# Patient Record
Sex: FEMALE | Race: WHITE | NOT HISPANIC OR LATINO | URBAN - METROPOLITAN AREA
[De-identification: names, ages, dates, MRNs, and addresses within clinical notes are randomized per-mention and may not be internally consistent; named-entity substitution may affect disease eponyms.]

---

## 2017-05-18 ENCOUNTER — EMERGENCY (EMERGENCY)
Facility: HOSPITAL | Age: 67
LOS: 1 days | Discharge: HOME | End: 2017-05-18
Attending: EMERGENCY MEDICINE | Admitting: EMERGENCY MEDICINE
Payer: COMMERCIAL

## 2017-05-18 VITALS
WEIGHT: 136.91 LBS | HEIGHT: 68 IN | OXYGEN SATURATION: 100 % | RESPIRATION RATE: 14 BRPM | DIASTOLIC BLOOD PRESSURE: 68 MMHG | TEMPERATURE: 98 F | HEART RATE: 98 BPM | SYSTOLIC BLOOD PRESSURE: 118 MMHG

## 2017-05-18 VITALS
DIASTOLIC BLOOD PRESSURE: 67 MMHG | OXYGEN SATURATION: 100 % | SYSTOLIC BLOOD PRESSURE: 122 MMHG | RESPIRATION RATE: 15 BRPM | HEART RATE: 92 BPM

## 2017-05-18 DIAGNOSIS — M53.3 SACROCOCCYGEAL DISORDERS, NOT ELSEWHERE CLASSIFIED: ICD-10-CM

## 2017-05-18 DIAGNOSIS — S09.90XA UNSPECIFIED INJURY OF HEAD, INITIAL ENCOUNTER: ICD-10-CM

## 2017-05-18 DIAGNOSIS — Z88.8 ALLERGY STATUS TO OTHER DRUGS, MEDICAMENTS AND BIOLOGICAL SUBSTANCES STATUS: ICD-10-CM
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
## 2017-05-18 DIAGNOSIS — Z88.2 ALLERGY STATUS TO SULFONAMIDES: ICD-10-CM

## 2017-05-18 DIAGNOSIS — Z88.1 ALLERGY STATUS TO OTHER ANTIBIOTIC AGENTS STATUS: ICD-10-CM

## 2017-05-18 PROCEDURE — 72125 CT NECK SPINE W/O DYE: CPT

## 2017-05-18 PROCEDURE — 72170 X-RAY EXAM OF PELVIS: CPT | Mod: 26

## 2017-05-18 PROCEDURE — 72170 X-RAY EXAM OF PELVIS: CPT

## 2017-05-18 PROCEDURE — 70450 CT HEAD/BRAIN W/O DYE: CPT

## 2017-05-18 PROCEDURE — 99284 EMERGENCY DEPT VISIT MOD MDM: CPT

## 2017-05-18 PROCEDURE — 70450 CT HEAD/BRAIN W/O DYE: CPT | Mod: 26

## 2017-05-18 PROCEDURE — 99282 EMERGENCY DEPT VISIT SF MDM: CPT

## 2017-05-18 PROCEDURE — 72125 CT NECK SPINE W/O DYE: CPT | Mod: 26

## 2017-05-18 NOTE — ED ADULT NURSE NOTE - OBJECTIVE STATEMENT
Pt arrived c/o s/p fall while exercising today. Pt alert, c/o back pain and headache, VSS no acute distress. Pt denies LOC, no neuro deficits noted at this time. Gait steady. Pt denies blurry vision, no nausea/vomiting. Xray and CT done, awaiting results.

## 2017-05-18 NOTE — ED PROVIDER NOTE - MUSCULOSKELETAL, MLM
Spine appears normal, range of motion is not limited, mild ttp right buttock, no ecchymosis, no swelling

## 2017-05-18 NOTE — ED PROVIDER NOTE - OBJECTIVE STATEMENT
65 yo female s/p mechanical fall at gym while taking a step this morning, her right knee gave out because of fatigue and fell backwards and hit the top of her head against a stationary machine, no LOC, no neck pain.  c/o head pain and right buttock pain.  Ambulating well into ED.  No nausea/vomiting, no other complaints.
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
## 2017-08-17 ENCOUNTER — OUTPATIENT (OUTPATIENT)
Dept: OUTPATIENT SERVICES | Facility: HOSPITAL | Age: 67
LOS: 1 days | End: 2017-08-17
Payer: COMMERCIAL

## 2017-08-17 VITALS
RESPIRATION RATE: 16 BRPM | HEIGHT: 67.5 IN | SYSTOLIC BLOOD PRESSURE: 110 MMHG | WEIGHT: 139.99 LBS | DIASTOLIC BLOOD PRESSURE: 70 MMHG | HEART RATE: 62 BPM | TEMPERATURE: 98 F

## 2017-08-17 DIAGNOSIS — M75.32 CALCIFIC TENDINITIS OF LEFT SHOULDER: ICD-10-CM

## 2017-08-17 DIAGNOSIS — Z90.721 ACQUIRED ABSENCE OF OVARIES, UNILATERAL: Chronic | ICD-10-CM

## 2017-08-17 DIAGNOSIS — Z98.890 OTHER SPECIFIED POSTPROCEDURAL STATES: Chronic | ICD-10-CM

## 2017-08-17 LAB
ALBUMIN SERPL ELPH-MCNC: 4.3 G/DL — SIGNIFICANT CHANGE UP (ref 3.3–5)
ALP SERPL-CCNC: 78 U/L — SIGNIFICANT CHANGE UP (ref 40–120)
ALT FLD-CCNC: 16 U/L — SIGNIFICANT CHANGE UP (ref 4–33)
AST SERPL-CCNC: 25 U/L — SIGNIFICANT CHANGE UP (ref 4–32)
BILIRUB SERPL-MCNC: 0.4 MG/DL — SIGNIFICANT CHANGE UP (ref 0.2–1.2)
BUN SERPL-MCNC: 17 MG/DL — SIGNIFICANT CHANGE UP (ref 7–23)
CALCIUM SERPL-MCNC: 9.1 MG/DL — SIGNIFICANT CHANGE UP (ref 8.4–10.5)
CHLORIDE SERPL-SCNC: 102 MMOL/L — SIGNIFICANT CHANGE UP (ref 98–107)
CO2 SERPL-SCNC: 28 MMOL/L — SIGNIFICANT CHANGE UP (ref 22–31)
CREAT SERPL-MCNC: 0.7 MG/DL — SIGNIFICANT CHANGE UP (ref 0.5–1.3)
GLUCOSE SERPL-MCNC: 79 MG/DL — SIGNIFICANT CHANGE UP (ref 70–99)
POTASSIUM SERPL-MCNC: 4.1 MMOL/L — SIGNIFICANT CHANGE UP (ref 3.5–5.3)
POTASSIUM SERPL-SCNC: 4.1 MMOL/L — SIGNIFICANT CHANGE UP (ref 3.5–5.3)
PROT SERPL-MCNC: 7.1 G/DL — SIGNIFICANT CHANGE UP (ref 6–8.3)
SODIUM SERPL-SCNC: 142 MMOL/L — SIGNIFICANT CHANGE UP (ref 135–145)

## 2017-08-17 PROCEDURE — 93010 ELECTROCARDIOGRAM REPORT: CPT
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
## 2017-08-17 NOTE — H&P PST ADULT - NSANTHOSAYNRD_GEN_A_CORE
No. MARIA FERNANDA screening performed.  STOP BANG Legend: 0-2 = LOW Risk; 3-4 = INTERMEDIATE Risk; 5-8 = HIGH Risk

## 2017-08-17 NOTE — H&P PST ADULT - PSH
H/O lumpectomy  left 10/2016  H/O oophorectomy  2013  S/P D&C  with hysteroscopy for uterine polypectomy in 2010  S/P Tonsillectomy  childhood  Status Post Exploratory Laparotomy  with left oophorectomy  in 1998

## 2017-08-17 NOTE — H&P PST ADULT - HISTORY OF PRESENT ILLNESS
66 year old femael with c/o left shoulder pain x 2 years, xray reveals calcifications. Pt presents today for presurgical eavaluation for ... 66 year old female with c/o left shoulder pain x 2 years, xray reveals calcifications. Pt presents today for presurgical eavaluation for Left Shoulder Arthroscopy Calcium Resection scheduled on 8/31/17.

## 2017-08-17 NOTE — H&P PST ADULT - MUSCULOSKELETAL
details… detailed exam no joint warmth/decreased ROM due to pain/left shoulder/no joint swelling/no joint erythema/no calf tenderness

## 2017-08-17 NOTE — H&P PST ADULT - NEGATIVE ENMT SYMPTOMS
no hearing difficulty/no dysphagia/no vertigo/no sinus symptoms/no ear pain/no tinnitus/no throat pain

## 2017-08-17 NOTE — H&P PST ADULT - FAMILY HISTORY
Mother  Still living? Unknown  Family history of lung cancer, Age at diagnosis: Age Unknown     Father  Still living? Unknown  Family history of liver cancer, Age at diagnosis: Age Unknown

## 2017-08-17 NOTE — H&P PST ADULT - PROBLEM SELECTOR PLAN 1
Left Shoulder Arthroscopy Calcium Resection scheduled on 8/31/17.  Pre-op instructions provided. Pt verbalized understanding.   Pepcid provided for GI prophylaxis.   Chlorhexidine wash provided and instructions given.

## 2017-08-17 NOTE — H&P PST ADULT - NEGATIVE BREAST SYMPTOMS
no nipple discharge R/no breast lump L/no nipple discharge L/no breast tenderness R/no breast lump R

## 2017-08-31 ENCOUNTER — OUTPATIENT (OUTPATIENT)
Dept: OUTPATIENT SERVICES | Facility: HOSPITAL | Age: 67
LOS: 1 days | Discharge: HOME | End: 2017-08-31

## 2017-08-31 VITALS
TEMPERATURE: 98 F | DIASTOLIC BLOOD PRESSURE: 64 MMHG | OXYGEN SATURATION: 99 % | HEART RATE: 87 BPM | SYSTOLIC BLOOD PRESSURE: 105 MMHG | RESPIRATION RATE: 15 BRPM

## 2017-08-31 VITALS
RESPIRATION RATE: 16 BRPM | HEIGHT: 67.5 IN | WEIGHT: 139.99 LBS | HEART RATE: 90 BPM | TEMPERATURE: 98 F | DIASTOLIC BLOOD PRESSURE: 56 MMHG | SYSTOLIC BLOOD PRESSURE: 119 MMHG | OXYGEN SATURATION: 100 %

## 2017-08-31 DIAGNOSIS — Z90.721 ACQUIRED ABSENCE OF OVARIES, UNILATERAL: Chronic | ICD-10-CM

## 2017-08-31 DIAGNOSIS — M75.32 CALCIFIC TENDINITIS OF LEFT SHOULDER: ICD-10-CM

## 2017-08-31 DIAGNOSIS — Z98.890 OTHER SPECIFIED POSTPROCEDURAL STATES: Chronic | ICD-10-CM

## 2017-08-31 RX ORDER — ONDANSETRON 8 MG/1
1 TABLET, FILM COATED ORAL
Qty: 15 | Refills: 0 | OUTPATIENT
Start: 2017-08-31 | End: 2017-09-05

## 2017-08-31 RX ORDER — OXYCODONE HYDROCHLORIDE 5 MG/1
1 TABLET ORAL
Qty: 30 | Refills: 0 | OUTPATIENT
Start: 2017-08-31 | End: 2017-09-05

## 2017-08-31 NOTE — ASU DISCHARGE PLAN (ADULT/PEDIATRIC). - NOTIFY
Fever greater than 101/Bleeding that does not stop/Numbness, color, or temperature change to extremity/Swelling that continues/Pain not relieved by Medications/Inability to Tolerate Liquids or Foods

## 2017-08-31 NOTE — ASU DISCHARGE PLAN (ADULT/PEDIATRIC). - SPECIAL INSTRUCTIONS
refer to Dr. Álvarez Instruction sheet    May remove the dressing in 24 hours; may shower; keep Tegaderm dressing clean, dry and intact.     DO NOT APPLY ANY LOTIONS, CREAMS OR OINTMENTS TO SURGICAL SITE.     Must keep sling in place for 4-6 weeks to provide both comfort and support.

## 2017-09-01 ENCOUNTER — TRANSCRIPTION ENCOUNTER (OUTPATIENT)
Age: 67
End: 2017-09-01

## 2018-01-18 ENCOUNTER — EMERGENCY (EMERGENCY)
Facility: HOSPITAL | Age: 68
LOS: 1 days | Discharge: HOME | End: 2018-01-18
Attending: EMERGENCY MEDICINE | Admitting: EMERGENCY MEDICINE
Payer: MEDICARE

## 2018-01-18 VITALS
HEART RATE: 84 BPM | TEMPERATURE: 98 F | DIASTOLIC BLOOD PRESSURE: 71 MMHG | RESPIRATION RATE: 16 BRPM | OXYGEN SATURATION: 98 % | SYSTOLIC BLOOD PRESSURE: 125 MMHG

## 2018-01-18 VITALS
TEMPERATURE: 98 F | OXYGEN SATURATION: 99 % | WEIGHT: 138.89 LBS | RESPIRATION RATE: 16 BRPM | SYSTOLIC BLOOD PRESSURE: 147 MMHG | HEART RATE: 148 BPM | DIASTOLIC BLOOD PRESSURE: 99 MMHG

## 2018-01-18 DIAGNOSIS — Z98.890 OTHER SPECIFIED POSTPROCEDURAL STATES: Chronic | ICD-10-CM

## 2018-01-18 DIAGNOSIS — Z90.721 ACQUIRED ABSENCE OF OVARIES, UNILATERAL: Chronic | ICD-10-CM

## 2018-01-18 LAB
ANION GAP SERPL CALC-SCNC: 9 MMOL/L — SIGNIFICANT CHANGE UP (ref 5–17)
BUN SERPL-MCNC: 13 MG/DL — SIGNIFICANT CHANGE UP (ref 7–23)
CALCIUM SERPL-MCNC: 9.1 MG/DL — SIGNIFICANT CHANGE UP (ref 8.5–10.1)
CHLORIDE SERPL-SCNC: 106 MMOL/L — SIGNIFICANT CHANGE UP (ref 96–108)
CO2 SERPL-SCNC: 27 MMOL/L — SIGNIFICANT CHANGE UP (ref 22–31)
CREAT SERPL-MCNC: 0.75 MG/DL — SIGNIFICANT CHANGE UP (ref 0.5–1.3)
GLUCOSE SERPL-MCNC: 113 MG/DL — HIGH (ref 70–99)
HCT VFR BLD CALC: 43.9 % — SIGNIFICANT CHANGE UP (ref 34.5–45)
HGB BLD-MCNC: 13.8 G/DL — SIGNIFICANT CHANGE UP (ref 11.5–15.5)
MCHC RBC-ENTMCNC: 28.5 PG — SIGNIFICANT CHANGE UP (ref 27–34)
MCHC RBC-ENTMCNC: 31.5 GM/DL — LOW (ref 32–36)
MCV RBC AUTO: 90.5 FL — SIGNIFICANT CHANGE UP (ref 80–100)
PLATELET # BLD AUTO: 174 K/UL — SIGNIFICANT CHANGE UP (ref 150–400)
POTASSIUM SERPL-MCNC: 3.2 MMOL/L — LOW (ref 3.5–5.3)
POTASSIUM SERPL-SCNC: 3.2 MMOL/L — LOW (ref 3.5–5.3)
RBC # BLD: 4.85 M/UL — SIGNIFICANT CHANGE UP (ref 3.8–5.2)
RBC # FLD: 12.9 % — SIGNIFICANT CHANGE UP (ref 10.3–14.5)
SODIUM SERPL-SCNC: 142 MMOL/L — SIGNIFICANT CHANGE UP (ref 135–145)
WBC # BLD: 4.8 K/UL — SIGNIFICANT CHANGE UP (ref 3.8–10.5)
WBC # FLD AUTO: 4.8 K/UL — SIGNIFICANT CHANGE UP (ref 3.8–10.5)

## 2018-01-18 PROCEDURE — 99285 EMERGENCY DEPT VISIT HI MDM: CPT

## 2018-01-18 PROCEDURE — 85027 COMPLETE CBC AUTOMATED: CPT

## 2018-01-18 PROCEDURE — 80048 BASIC METABOLIC PNL TOTAL CA: CPT

## 2018-01-18 PROCEDURE — 99284 EMERGENCY DEPT VISIT MOD MDM: CPT

## 2018-01-18 PROCEDURE — 93005 ELECTROCARDIOGRAM TRACING: CPT

## 2018-01-18 RX ORDER — POTASSIUM CHLORIDE 20 MEQ
40 PACKET (EA) ORAL ONCE
Qty: 0 | Refills: 0 | Status: COMPLETED | OUTPATIENT
Start: 2018-01-18 | End: 2018-01-18

## 2018-01-18 RX ADMIN — Medication 40 MILLIEQUIVALENT(S): at 10:49

## 2018-01-18 NOTE — ED PROVIDER NOTE - OBJECTIVE STATEMENT
68 yo white female had the acute onset of dizziness few days ago lasting few hours. Symptoms resolved and then returned today while on stationary bicycle. No CP/Nausea/Vomiting/Headache. Does admit to some palpitations. Never had such symptoms in past.

## 2018-01-18 NOTE — ED PROVIDER NOTE - MEDICAL DECISION MAKING DETAILS
Transient dizziness today and few days ago requiring evaluation, labs, ecg and cardiology consultation with Dr. Sidhu
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
## 2018-01-18 NOTE — ED ADULT NURSE NOTE - CHPI ED SYMPTOMS NEG
no diaphoresis/no back pain/no shortness of breath/no chest pain/no cough/no syncope/no chills/no fever

## 2018-01-18 NOTE — ED ADULT NURSE NOTE - OBJECTIVE STATEMENT
pt stating a few episodes since Sunday of dizziness, lightheadedness and heart palpations. pt denies Cp and sob. pt has hx of tachycardia, but only a few episodes.

## 2018-01-18 NOTE — CONSULT NOTE ADULT - ASSESSMENT
67f prior dx of "idiopathic tachycardia". Was told that her bp was too low to treat with meds, and given very rare episodes she was treated conservatively.  No baseline sx of angina, hf or arrhythmia.    Under the weather for several days.  Intermittent lh without real palpitations.  Today went to gym and felt suddenly lightheaded. hr monitor on the bike was "180".  Came to er. initial ekg sr 92    -there is no evidence of acute ischemia.  -there is no documented significant arrhythmia.  -there is no evidence for meaningful  volume overload.  -possible relationship of some arrhythmia to hypokalemia  -suggest outpt echo  -if sxs recur and do not revert back to rare episodes every 3-5 years, monitoring would be useful to determine appropriate strategy for management.

## 2018-01-31 ENCOUNTER — MOBILE ON CALL (OUTPATIENT)
Age: 68
End: 2018-01-31

## 2018-02-01 ENCOUNTER — TRANSCRIPTION ENCOUNTER (OUTPATIENT)
Age: 68
End: 2018-02-01

## 2018-02-01 ENCOUNTER — OTHER (OUTPATIENT)
Age: 68
End: 2018-02-01

## 2018-02-01 DIAGNOSIS — R01.1 CARDIAC MURMUR, UNSPECIFIED: ICD-10-CM

## 2018-05-10 NOTE — ED PROVIDER NOTE - NS ED MD EM SELECTION
Spoke with the patient and gave surgery arrival time 6:00am, also do not eat anything after 9:00pm the night before surgery. Patient may have water, gatorade, or powerade from 9:00pm til you leave your home the morning of surgery.      
[id number] Detailed

## 2018-07-11 ENCOUNTER — INPATIENT (INPATIENT)
Facility: HOSPITAL | Age: 68
LOS: 1 days | Discharge: HOME | DRG: 309 | End: 2018-07-13
Attending: INTERNAL MEDICINE | Admitting: INTERNAL MEDICINE
Payer: MEDICARE

## 2018-07-11 DIAGNOSIS — Z98.890 OTHER SPECIFIED POSTPROCEDURAL STATES: Chronic | ICD-10-CM

## 2018-07-11 DIAGNOSIS — Z90.721 ACQUIRED ABSENCE OF OVARIES, UNILATERAL: Chronic | ICD-10-CM

## 2018-07-12 VITALS
WEIGHT: 139.99 LBS | OXYGEN SATURATION: 100 % | TEMPERATURE: 98 F | SYSTOLIC BLOOD PRESSURE: 146 MMHG | HEIGHT: 68 IN | HEART RATE: 178 BPM | RESPIRATION RATE: 18 BRPM | DIASTOLIC BLOOD PRESSURE: 90 MMHG

## 2018-07-12 DIAGNOSIS — R53.82 CHRONIC FATIGUE, UNSPECIFIED: ICD-10-CM

## 2018-07-12 DIAGNOSIS — I48.91 UNSPECIFIED ATRIAL FIBRILLATION: ICD-10-CM

## 2018-07-12 DIAGNOSIS — A60.00 HERPESVIRAL INFECTION OF UROGENITAL SYSTEM, UNSPECIFIED: ICD-10-CM

## 2018-07-12 DIAGNOSIS — I47.2 VENTRICULAR TACHYCARDIA: ICD-10-CM

## 2018-07-12 DIAGNOSIS — Z29.9 ENCOUNTER FOR PROPHYLACTIC MEASURES, UNSPECIFIED: ICD-10-CM

## 2018-07-12 LAB
ALBUMIN SERPL ELPH-MCNC: 4.2 G/DL — SIGNIFICANT CHANGE UP (ref 3.3–5)
ALP SERPL-CCNC: 97 U/L — SIGNIFICANT CHANGE UP (ref 40–120)
ALT FLD-CCNC: 28 U/L — SIGNIFICANT CHANGE UP (ref 12–78)
ANION GAP SERPL CALC-SCNC: 9 MMOL/L — SIGNIFICANT CHANGE UP (ref 5–17)
ANION GAP SERPL CALC-SCNC: 9 MMOL/L — SIGNIFICANT CHANGE UP (ref 5–17)
AST SERPL-CCNC: 29 U/L — SIGNIFICANT CHANGE UP (ref 15–37)
BASOPHILS # BLD AUTO: 0.05 K/UL — SIGNIFICANT CHANGE UP (ref 0–0.2)
BASOPHILS NFR BLD AUTO: 0.6 % — SIGNIFICANT CHANGE UP (ref 0–2)
BILIRUB SERPL-MCNC: 0.3 MG/DL — SIGNIFICANT CHANGE UP (ref 0.2–1.2)
BUN SERPL-MCNC: 13 MG/DL — SIGNIFICANT CHANGE UP (ref 7–23)
BUN SERPL-MCNC: 19 MG/DL — SIGNIFICANT CHANGE UP (ref 7–23)
CALCIUM SERPL-MCNC: 8.2 MG/DL — LOW (ref 8.5–10.1)
CALCIUM SERPL-MCNC: 9 MG/DL — SIGNIFICANT CHANGE UP (ref 8.5–10.1)
CHLORIDE SERPL-SCNC: 105 MMOL/L — SIGNIFICANT CHANGE UP (ref 96–108)
CHLORIDE SERPL-SCNC: 111 MMOL/L — HIGH (ref 96–108)
CK MB BLD-MCNC: 1.2 % — SIGNIFICANT CHANGE UP (ref 0–3.5)
CK MB BLD-MCNC: 1.5 % — SIGNIFICANT CHANGE UP (ref 0–3.5)
CK MB BLD-MCNC: 1.6 % — SIGNIFICANT CHANGE UP (ref 0–3.5)
CK MB CFR SERPL CALC: 1.7 NG/ML — SIGNIFICANT CHANGE UP (ref 0–3.6)
CK MB CFR SERPL CALC: 2.1 NG/ML — SIGNIFICANT CHANGE UP (ref 0–3.6)
CK MB CFR SERPL CALC: 2.1 NG/ML — SIGNIFICANT CHANGE UP (ref 0–3.6)
CK SERPL-CCNC: 114 U/L — SIGNIFICANT CHANGE UP (ref 26–192)
CK SERPL-CCNC: 129 U/L — SIGNIFICANT CHANGE UP (ref 26–192)
CK SERPL-CCNC: 170 U/L — SIGNIFICANT CHANGE UP (ref 26–192)
CO2 SERPL-SCNC: 25 MMOL/L — SIGNIFICANT CHANGE UP (ref 22–31)
CO2 SERPL-SCNC: 27 MMOL/L — SIGNIFICANT CHANGE UP (ref 22–31)
CREAT SERPL-MCNC: 0.58 MG/DL — SIGNIFICANT CHANGE UP (ref 0.5–1.3)
CREAT SERPL-MCNC: 0.79 MG/DL — SIGNIFICANT CHANGE UP (ref 0.5–1.3)
D DIMER BLD IA.RAPID-MCNC: <150 NG/ML DDU — SIGNIFICANT CHANGE UP
EOSINOPHIL # BLD AUTO: 0.08 K/UL — SIGNIFICANT CHANGE UP (ref 0–0.5)
EOSINOPHIL NFR BLD AUTO: 1 % — SIGNIFICANT CHANGE UP (ref 0–6)
GLUCOSE SERPL-MCNC: 140 MG/DL — HIGH (ref 70–99)
GLUCOSE SERPL-MCNC: 96 MG/DL — SIGNIFICANT CHANGE UP (ref 70–99)
HCT VFR BLD CALC: 41.1 % — SIGNIFICANT CHANGE UP (ref 34.5–45)
HCT VFR BLD CALC: 44 % — SIGNIFICANT CHANGE UP (ref 34.5–45)
HGB BLD-MCNC: 13.3 G/DL — SIGNIFICANT CHANGE UP (ref 11.5–15.5)
HGB BLD-MCNC: 14.3 G/DL — SIGNIFICANT CHANGE UP (ref 11.5–15.5)
IMM GRANULOCYTES NFR BLD AUTO: 0.3 % — SIGNIFICANT CHANGE UP (ref 0–1.5)
LYMPHOCYTES # BLD AUTO: 2.4 K/UL — SIGNIFICANT CHANGE UP (ref 1–3.3)
LYMPHOCYTES # BLD AUTO: 30.7 % — SIGNIFICANT CHANGE UP (ref 13–44)
MAGNESIUM SERPL-MCNC: 2.2 MG/DL — SIGNIFICANT CHANGE UP (ref 1.6–2.6)
MCHC RBC-ENTMCNC: 28.4 PG — SIGNIFICANT CHANGE UP (ref 27–34)
MCHC RBC-ENTMCNC: 28.5 PG — SIGNIFICANT CHANGE UP (ref 27–34)
MCHC RBC-ENTMCNC: 32.4 GM/DL — SIGNIFICANT CHANGE UP (ref 32–36)
MCHC RBC-ENTMCNC: 32.5 GM/DL — SIGNIFICANT CHANGE UP (ref 32–36)
MCV RBC AUTO: 87.8 FL — SIGNIFICANT CHANGE UP (ref 80–100)
MCV RBC AUTO: 87.8 FL — SIGNIFICANT CHANGE UP (ref 80–100)
MONOCYTES # BLD AUTO: 0.68 K/UL — SIGNIFICANT CHANGE UP (ref 0–0.9)
MONOCYTES NFR BLD AUTO: 8.7 % — SIGNIFICANT CHANGE UP (ref 2–14)
NEUTROPHILS # BLD AUTO: 4.58 K/UL — SIGNIFICANT CHANGE UP (ref 1.8–7.4)
NEUTROPHILS NFR BLD AUTO: 58.7 % — SIGNIFICANT CHANGE UP (ref 43–77)
NRBC # BLD: 0 /100 WBCS — SIGNIFICANT CHANGE UP (ref 0–0)
NRBC # BLD: 0 /100 WBCS — SIGNIFICANT CHANGE UP (ref 0–0)
PHOSPHATE SERPL-MCNC: 3.2 MG/DL — SIGNIFICANT CHANGE UP (ref 2.5–4.5)
PLATELET # BLD AUTO: 188 K/UL — SIGNIFICANT CHANGE UP (ref 150–400)
PLATELET # BLD AUTO: 192 K/UL — SIGNIFICANT CHANGE UP (ref 150–400)
POTASSIUM SERPL-MCNC: 3.5 MMOL/L — SIGNIFICANT CHANGE UP (ref 3.5–5.3)
POTASSIUM SERPL-MCNC: 3.5 MMOL/L — SIGNIFICANT CHANGE UP (ref 3.5–5.3)
POTASSIUM SERPL-SCNC: 3.5 MMOL/L — SIGNIFICANT CHANGE UP (ref 3.5–5.3)
POTASSIUM SERPL-SCNC: 3.5 MMOL/L — SIGNIFICANT CHANGE UP (ref 3.5–5.3)
PROT SERPL-MCNC: 7.9 G/DL — SIGNIFICANT CHANGE UP (ref 6–8.3)
RBC # BLD: 4.68 M/UL — SIGNIFICANT CHANGE UP (ref 3.8–5.2)
RBC # BLD: 5.01 M/UL — SIGNIFICANT CHANGE UP (ref 3.8–5.2)
RBC # FLD: 13.7 % — SIGNIFICANT CHANGE UP (ref 10.3–14.5)
RBC # FLD: 14.1 % — SIGNIFICANT CHANGE UP (ref 10.3–14.5)
SODIUM SERPL-SCNC: 141 MMOL/L — SIGNIFICANT CHANGE UP (ref 135–145)
SODIUM SERPL-SCNC: 145 MMOL/L — SIGNIFICANT CHANGE UP (ref 135–145)
T3 SERPL-MCNC: 129 NG/DL — SIGNIFICANT CHANGE UP (ref 80–200)
T4 AB SER-ACNC: 6.8 UG/DL — SIGNIFICANT CHANGE UP (ref 4.6–12)
TROPONIN I SERPL-MCNC: <.015 NG/ML — SIGNIFICANT CHANGE UP (ref 0.01–0.04)
TSH SERPL-MCNC: 0.85 UIU/ML — SIGNIFICANT CHANGE UP (ref 0.36–3.74)
WBC # BLD: 5.87 K/UL — SIGNIFICANT CHANGE UP (ref 3.8–10.5)
WBC # BLD: 7.81 K/UL — SIGNIFICANT CHANGE UP (ref 3.8–10.5)
WBC # FLD AUTO: 5.87 K/UL — SIGNIFICANT CHANGE UP (ref 3.8–10.5)
WBC # FLD AUTO: 7.81 K/UL — SIGNIFICANT CHANGE UP (ref 3.8–10.5)

## 2018-07-12 PROCEDURE — 93010 ELECTROCARDIOGRAM REPORT: CPT

## 2018-07-12 PROCEDURE — 99223 1ST HOSP IP/OBS HIGH 75: CPT

## 2018-07-12 PROCEDURE — 99285 EMERGENCY DEPT VISIT HI MDM: CPT

## 2018-07-12 PROCEDURE — 93306 TTE W/DOPPLER COMPLETE: CPT | Mod: 26

## 2018-07-12 PROCEDURE — 71045 X-RAY EXAM CHEST 1 VIEW: CPT | Mod: 26

## 2018-07-12 RX ORDER — SODIUM CHLORIDE 9 MG/ML
3 INJECTION INTRAMUSCULAR; INTRAVENOUS; SUBCUTANEOUS ONCE
Qty: 0 | Refills: 0 | Status: COMPLETED | OUTPATIENT
Start: 2018-07-12 | End: 2018-07-12

## 2018-07-12 RX ORDER — DILTIAZEM HCL 120 MG
15 CAPSULE, EXT RELEASE 24 HR ORAL
Qty: 125 | Refills: 0 | Status: DISCONTINUED | OUTPATIENT
Start: 2018-07-12 | End: 2018-07-12

## 2018-07-12 RX ORDER — PANTOPRAZOLE SODIUM 20 MG/1
40 TABLET, DELAYED RELEASE ORAL
Qty: 0 | Refills: 0 | Status: DISCONTINUED | OUTPATIENT
Start: 2018-07-12 | End: 2018-07-13

## 2018-07-12 RX ORDER — ENOXAPARIN SODIUM 100 MG/ML
60 INJECTION SUBCUTANEOUS ONCE
Qty: 0 | Refills: 0 | Status: COMPLETED | OUTPATIENT
Start: 2018-07-12 | End: 2018-07-12

## 2018-07-12 RX ORDER — DILTIAZEM HCL 120 MG
20 CAPSULE, EXT RELEASE 24 HR ORAL
Qty: 125 | Refills: 0 | Status: DISCONTINUED | OUTPATIENT
Start: 2018-07-12 | End: 2018-07-12

## 2018-07-12 RX ORDER — DILTIAZEM HCL 120 MG
10 CAPSULE, EXT RELEASE 24 HR ORAL
Qty: 125 | Refills: 0 | Status: DISCONTINUED | OUTPATIENT
Start: 2018-07-12 | End: 2018-07-12

## 2018-07-12 RX ORDER — APIXABAN 2.5 MG/1
5 TABLET, FILM COATED ORAL EVERY 12 HOURS
Qty: 0 | Refills: 0 | Status: DISCONTINUED | OUTPATIENT
Start: 2018-07-13 | End: 2018-07-13

## 2018-07-12 RX ORDER — ASPIRIN/CALCIUM CARB/MAGNESIUM 324 MG
325 TABLET ORAL ONCE
Qty: 0 | Refills: 0 | Status: COMPLETED | OUTPATIENT
Start: 2018-07-12 | End: 2018-07-12

## 2018-07-12 RX ORDER — APIXABAN 2.5 MG/1
1 TABLET, FILM COATED ORAL
Qty: 60 | Refills: 0 | OUTPATIENT
Start: 2018-07-12 | End: 2018-08-10

## 2018-07-12 RX ORDER — ENOXAPARIN SODIUM 100 MG/ML
60 INJECTION SUBCUTANEOUS EVERY 12 HOURS
Qty: 0 | Refills: 0 | Status: DISCONTINUED | OUTPATIENT
Start: 2018-07-12 | End: 2018-07-12

## 2018-07-12 RX ORDER — ACETAMINOPHEN 500 MG
650 TABLET ORAL AT BEDTIME
Qty: 0 | Refills: 0 | Status: DISCONTINUED | OUTPATIENT
Start: 2018-07-12 | End: 2018-07-13

## 2018-07-12 RX ORDER — CYCLOBENZAPRINE HYDROCHLORIDE 10 MG/1
5 TABLET, FILM COATED ORAL ONCE
Qty: 0 | Refills: 0 | Status: COMPLETED | OUTPATIENT
Start: 2018-07-12 | End: 2018-07-12

## 2018-07-12 RX ORDER — POTASSIUM CHLORIDE 20 MEQ
40 PACKET (EA) ORAL EVERY 4 HOURS
Qty: 0 | Refills: 0 | Status: COMPLETED | OUTPATIENT
Start: 2018-07-12 | End: 2018-07-12

## 2018-07-12 RX ORDER — DIPHENHYDRAMINE HCL 50 MG
25 CAPSULE ORAL AT BEDTIME
Qty: 0 | Refills: 0 | Status: DISCONTINUED | OUTPATIENT
Start: 2018-07-12 | End: 2018-07-13

## 2018-07-12 RX ORDER — DILTIAZEM HCL 120 MG
120 CAPSULE, EXT RELEASE 24 HR ORAL DAILY
Qty: 0 | Refills: 0 | Status: DISCONTINUED | OUTPATIENT
Start: 2018-07-12 | End: 2018-07-13

## 2018-07-12 RX ORDER — FAMOTIDINE 10 MG/ML
20 INJECTION INTRAVENOUS ONCE
Qty: 0 | Refills: 0 | Status: COMPLETED | OUTPATIENT
Start: 2018-07-12 | End: 2018-07-12

## 2018-07-12 RX ORDER — ACYCLOVIR SODIUM 500 MG
400 VIAL (EA) INTRAVENOUS
Qty: 0 | Refills: 0 | Status: DISCONTINUED | OUTPATIENT
Start: 2018-07-12 | End: 2018-07-13

## 2018-07-12 RX ORDER — ACYCLOVIR SODIUM 500 MG
0 VIAL (EA) INTRAVENOUS
Qty: 0 | Refills: 0 | COMMUNITY

## 2018-07-12 RX ORDER — SODIUM CHLORIDE 9 MG/ML
1000 INJECTION INTRAMUSCULAR; INTRAVENOUS; SUBCUTANEOUS
Qty: 0 | Refills: 0 | Status: DISCONTINUED | OUTPATIENT
Start: 2018-07-12 | End: 2018-07-12

## 2018-07-12 RX ORDER — SODIUM CHLORIDE 9 MG/ML
1000 INJECTION INTRAMUSCULAR; INTRAVENOUS; SUBCUTANEOUS
Qty: 0 | Refills: 0 | Status: COMPLETED | OUTPATIENT
Start: 2018-07-12 | End: 2018-07-12

## 2018-07-12 RX ADMIN — ENOXAPARIN SODIUM 60 MILLIGRAM(S): 100 INJECTION SUBCUTANEOUS at 00:48

## 2018-07-12 RX ADMIN — Medication 15 MG/HR: at 03:06

## 2018-07-12 RX ADMIN — PANTOPRAZOLE SODIUM 40 MILLIGRAM(S): 20 TABLET, DELAYED RELEASE ORAL at 06:11

## 2018-07-12 RX ADMIN — FAMOTIDINE 20 MILLIGRAM(S): 10 INJECTION INTRAVENOUS at 17:58

## 2018-07-12 RX ADMIN — CYCLOBENZAPRINE HYDROCHLORIDE 5 MILLIGRAM(S): 10 TABLET, FILM COATED ORAL at 03:37

## 2018-07-12 RX ADMIN — SODIUM CHLORIDE 3 MILLILITER(S): 9 INJECTION INTRAMUSCULAR; INTRAVENOUS; SUBCUTANEOUS at 00:02

## 2018-07-12 RX ADMIN — Medication 20 MG/HR: at 02:32

## 2018-07-12 RX ADMIN — Medication 40 MILLIEQUIVALENT(S): at 17:15

## 2018-07-12 RX ADMIN — Medication 400 MILLIGRAM(S): at 06:11

## 2018-07-12 RX ADMIN — SODIUM CHLORIDE 1000 MILLILITER(S): 9 INJECTION INTRAMUSCULAR; INTRAVENOUS; SUBCUTANEOUS at 00:02

## 2018-07-12 RX ADMIN — SODIUM CHLORIDE 50 MILLILITER(S): 9 INJECTION INTRAMUSCULAR; INTRAVENOUS; SUBCUTANEOUS at 05:14

## 2018-07-12 RX ADMIN — Medication 400 MILLIGRAM(S): at 17:15

## 2018-07-12 RX ADMIN — SODIUM CHLORIDE 1000 MILLILITER(S): 9 INJECTION INTRAMUSCULAR; INTRAVENOUS; SUBCUTANEOUS at 01:21

## 2018-07-12 RX ADMIN — ENOXAPARIN SODIUM 60 MILLIGRAM(S): 100 INJECTION SUBCUTANEOUS at 13:05

## 2018-07-12 RX ADMIN — Medication 325 MILLIGRAM(S): at 00:10

## 2018-07-12 RX ADMIN — Medication 40 MILLIEQUIVALENT(S): at 13:05

## 2018-07-12 RX ADMIN — Medication 10 MG/HR: at 00:46

## 2018-07-12 NOTE — PROGRESS NOTE ADULT - ASSESSMENT
68 y/o F PMHX idiopathic ventricular tachycardia, breast cancer 9/2016 s/p surgery and radiation, chronic fatigue syndrome, degenerative disc disease, genital herpes, hiatal hernia admitted with new onset afib w/rvr. Now in NSR.

## 2018-07-12 NOTE — H&P ADULT - NSHPPHYSICALEXAM_GEN_ALL_CORE
Vital Signs Last 24 Hrs  T(C): 36.6 (12 Jul 2018 00:01), Max: 36.6 (12 Jul 2018 00:01)  T(F): 97.8 (12 Jul 2018 00:01), Max: 97.8 (12 Jul 2018 00:01)  HR: 114 (12 Jul 2018 00:30) (114 - 178)  BP: 150/87 (12 Jul 2018 00:30) (144/69 - 150/87)  BP(mean): --  RR: 18 (12 Jul 2018 00:30) (17 - 18)  SpO2: 100% (12 Jul 2018 00:30) (100% - 100%)

## 2018-07-12 NOTE — H&P ADULT - FAMILY HISTORY
Father  Still living? Unknown  Family history of liver cancer, Age at diagnosis: Age Unknown     Mother  Still living? Unknown  Family history of lung cancer, Age at diagnosis: Age Unknown

## 2018-07-12 NOTE — ED PROVIDER NOTE - OBJECTIVE STATEMENT
68 yo F p/w palpitations x past ~ 45 min. Pt states has been having on / off episodes of same x past ~ 2 days. No fever/chills. no numb/ting/focal weak. NO abd pain. no n/v/d. No chest pain. Mild assoc dyspnea. No agg/allev factors. NO other inj or co.

## 2018-07-12 NOTE — CONSULT NOTE ADULT - ASSESSMENT
68 y/o F PMHX idiopathic ventricular tachycardia, breast cancer 9/2016 s/p surgery and radiation, chronic fatigue syndrome, degenerative disc disease, genital herpes, hiatal hernia presents with palpitations x2 days, admitted for new onset atrial fibrillation.      - Telemetry shows pt converted overnight, no other events  - s/p Cardizem ggt, c/w Cardizem 30mg PO Q6hrs  - No clear evidence of acute ischemia, trops negative x 2   - CHADS VASC = 2 (66yo, Female), candidate for AC, but reported hx of MR  - F/u Echo  - No evidence of volume overload  - No acute changes on EKG compared to previous  - Follows with Dr. Brown, f/u outpatient reports  - BP well controlled, continue home BP meds, monitor routine hemodynamics  - monitor and replete lytes, keep K>4, Mg>2  - Other cardiovascular workup will depend on clinical course.  - All other workup per primary team  - Will follow 68 y/o F PMHX idiopathic ventricular tachycardia, breast cancer 9/2016 s/p surgery and radiation, chronic fatigue syndrome, degenerative disc disease, genital herpes, hiatal hernia presents with palpitations x2 days, admitted for new onset atrial fibrillation.      - Telemetry shows pt converted overnight, no other events  - s/p Cardizem ggt, on Cardizem 30mg PO Q6hrs now  - Start Toprol XL 50 PO QD  - No clear evidence of acute ischemia, trops negative x 2   - CHADS VASC = 2 (66yo, Female), candidate for AC, Eliquis would be appropriate  - F/u Echo  - No evidence of volume overload  - No acute changes on EKG compared to previous  - Follows with Dr. Brown, f/u outpatient reports  - BP well controlled, continue home BP meds, monitor routine hemodynamics  - monitor and replete lytes, keep K>4, Mg>2  - Other cardiovascular workup will depend on clinical course.  - All other workup per primary team  - Will follow 66 y/o F PMHX idiopathic ventricular tachycardia, breast cancer 9/2016 s/p surgery and radiation, chronic fatigue syndrome, degenerative disc disease, genital herpes, hiatal hernia presents with palpitations x2 days, admitted for new onset atrial fibrillation.    - Telemetry shows pt converted overnight, no other events  - s/p Cardizem ggt, on Cardizem 30mg PO Q6hrs now  - No clear evidence of acute ischemia, trops negative x 2   - CHADS VASC = 2 (68yo, Female), candidate for AC, Eliquis would be appropriate  - F/u Echo to r/o effusion and and mitral disease  - No evidence of volume overload  - No acute changes on EKG compared to previous  - Follows with Dr. Brown,  - BP well controlled, continue home BP meds, monitor routine hemodynamics  - monitor and replete lytes, keep K>4, Mg>2  - Other cardiovascular workup will depend on clinical course.  - All other workup per primary team  - Will follow
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
## 2018-07-12 NOTE — H&P ADULT - GASTROINTESTINAL DETAILS
no distention/no masses palpable/no rebound tenderness/nontender/soft/no guarding/bowel sounds normal no distention/bowel sounds normal/no bruit/soft/nontender/no masses palpable/no rebound tenderness/no guarding/no organomegaly

## 2018-07-12 NOTE — ED ADULT NURSE REASSESSMENT NOTE - NS ED NURSE REASSESS COMMENT FT1
Patient states she felt a fluttering, palpitation as what she had felt when she came. Dr. Sifuentes made aware and ordered to increased the cardizem drip to 20mg/hr.
Called up [address] to give report but RN assigned to patient is on break at this time. No one will take report as per Kimi. KINGSTON Ruiz made aware.
Cardizem drip lowered down 15mg/hr as ordered.
I spoke with Dr. Maria Gardner and asked her if patient needs to be on isolation precaution for her herpes and told no need as patient taking acyclovir as prophylactic for genital herpes and its chronic.

## 2018-07-12 NOTE — H&P ADULT - ASSESSMENT
68 y/o F PMHX idiopathic ventricular tachycardia, breast cancer 9/2016 s/p surgery and radiation, chronic fatigue syndrome, degenerative disc disease, genital herpes, hiatal hernia admitted with new onset afib w/rvr. 66 y/o F PMHX idiopathic ventricular tachycardia, breast cancer 9/2016 s/p surgery and radiation, chronic fatigue syndrome, degenerative disc disease, genital herpes, hiatal hernia admitted with new onset afib w/rvr. Pt on IV Cardizem drip

## 2018-07-12 NOTE — H&P ADULT - NEGATIVE MUSCULOSKELETAL SYMPTOMS
no muscle cramps/no muscle weakness/no myalgia no myalgia/no arthralgia/no muscle cramps/no arthritis/no muscle weakness

## 2018-07-12 NOTE — H&P ADULT - PROBLEM SELECTOR PLAN 5
DVT PPX -  fall risk, ambulate with assistance, OOB with assistance DVT PPX - s/p lovenox 60mg   fall risk, ambulate with assistance, OOB with assistance DVT PPX - s/p lovenox 60mg - started on lovenox 40mg daily for dvt ppx  fall risk, ambulate with assistance, OOB with assistance DVT PPX - s/p Lovenox 60mg in ER Given  - started on lovenox 40mg daily for dvt ppx  fall risk, ambulate with assistance, OOB with assistance

## 2018-07-12 NOTE — H&P ADULT - NEGATIVE CARDIOVASCULAR SYMPTOMS
no claudication/no chest pain/no dyspnea on exertion/no peripheral edema/no orthopnea/no paroxysmal nocturnal dyspnea

## 2018-07-12 NOTE — H&P ADULT - HISTORY OF PRESENT ILLNESS
66 y/o F PMHX     In the ED, VS:  cbc unremarkable, D-dimer neg, cmp unremarkable except for gluc 140,  neg cardiac enzymes 66 y/o F PMHX     In the ED, VS:  cbc unremarkable, D-dimer neg, cmp unremarkable except for gluc 140, neg cardiac enzymes, EKG afib with RVR @ 173BPM. 68 y/o F PMHX idiopathic ventricular tachycardia, breast cancer 9/2016 s/p surgery and radiation, chronic fatigue syndrome, degenerative disc disease, genital herpes, hiatal hernia presents with   Denies fever, chills, SOB, CP, abd pain, N/V/D,     In the ED, VS: afebrile, , /90, RR 18, sat 100% RA, cbc unremarkable, D-dimer neg, cmp unremarkable except for gluc 140, neg cardiac enzymes, EKG afib with RVR @ 173BPM. Received IV cardizem 10mg x2 and started on cardizem infusion. 66 y/o F PMHX idiopathic ventricular tachycardia, breast cancer 9/2016 s/p surgery and radiation, chronic fatigue syndrome, degenerative disc disease, genital herpes, hiatal hernia presents with palpitations x2 days. Patient states the episodes of palpitations have been intermittent. Reports feeling her heart pounding against her chest last a few minutes at a time and occur at rest and movement. These episodes are similar to the patient's episode of idiopathic v tach about 10 years ago. At that time, patient experienced palpitations, but converted back to sinus. Patient states she has had these episodes on and off through the years, but they are becoming more frequent. Last episode was in 1/2018. At that time, patient was placed on a holter monitor for 30 days without any events. (+) chronic hot flashes. Denies fever, chills, SOB, CP, cough, abd pain, N/V/D, or urinary symptoms. No recent travel. No sick contacts. No change in weight.    In the ED, VS: afebrile, , /90, RR 18, sat 100% RA, cbc unremarkable, D-dimer neg, cmp unremarkable except for gluc 140, neg cardiac enzymes, EKG afib with RVR @ 173BPM. Received IV cardizem 10mg x2 and started on cardizem infusion. Cardio contacted in the ED. CXR prelim neg 66 y/o F PMHX idiopathic ventricular tachycardia, breast cancer 9/2016 s/p surgery and radiation, chronic fatigue syndrome, degenerative disc disease, genital herpes, hiatal hernia presents with palpitations x2 days. Patient states the episodes of palpitations have been intermittent. Reports feeling her heart pounding against her chest last a few minutes at a time and occur at rest and movement. These episodes are similar to the patient's episode of idiopathic v tach about 10 years ago. At that time, patient experienced palpitations, but converted back to sinus. Patient states she has had these episodes on and off through the years, but they are becoming more frequent. Last episode was in 1/2018. At that time, patient was placed on a holter monitor for 30 days without any events. (+) chronic hot flashes. Denies fever, chills, SOB, CP, cough, abd pain, N/V/D, or urinary symptoms. No recent travel. No sick contacts. No change in weight.    In the ED, VS: afebrile, , /90, RR 18, sat 100% RA, cbc unremarkable, D-dimer neg, cmp unremarkable except for gluc 140, neg cardiac enzymes, EKG afib with RVR @ 173BPM. Received IV cardizem 10mg x2 and started on cardizem infusion. Cardio contacted in the ED. CXR prelim neg. In ED, patient became SOB and 's while on cardizem drip @ 15. Repeat ekg similar to prior - afib @ 130s. Called Dr. Myers - likely anxiety. Gave flexeril for anxiety (patient unable to take benzos). 66 y/o F PMHX idiopathic ventricular tachycardia, breast cancer 9/2016 s/p surgery and radiation, chronic fatigue syndrome, degenerative disc disease, genital herpes, hiatal hernia presents with palpitations x2 days. Patient states the episodes of palpitations have been intermittent. Reports feeling her heart pounding against her chest last a few minutes at a time and occur at rest and movement. These episodes are similar to the patient's episode of idiopathic v tach about 10 years ago. At that time, patient experienced palpitations, but converted back to sinus. Patient states she has had these episodes on and off through the years, but they are becoming more frequent. Last episode was in 1/2018. At that time, patient was placed on a holter monitor for 30 days without any events. (+) chronic hot flashes. Denies fever, chills, SOB, CP, cough, abd pain, N/V/D, or urinary symptoms. No recent travel. No sick contacts. No change in weight. As per pt she has had extensive work up in past, seen by electrophysiologist, stress test & Holter monitors, As per pt her BP is Low & she was not on any cardiac meds    In the ED, VS: afebrile, , /90, RR 18, sat 100% RA, cbc unremarkable, D-dimer neg, cmp unremarkable except for gluc 140, neg cardiac enzymes, EKG afib with RVR @ 173BPM. Received IVP Cardizem 10mg x2 and started on IV  cardizem infusion. Cardio contacted in the ED. CXR prelim neg. In ED, patient became SOB and 's while on Cardizem drip @ 15. Repeat ekg similar to prior - afib @ 130s. Called Dr. Myers - likely anxiety. Gave flexeril for anxiety (patient unable to take benzos).

## 2018-07-12 NOTE — H&P ADULT - NEGATIVE NEUROLOGICAL SYMPTOMS
no loss of sensation/no weakness/no loss of consciousness/no headache/no confusion/no paresthesias/no vertigo

## 2018-07-12 NOTE — CONSULT NOTE ADULT - ATTENDING COMMENTS
Seen/examined. Agree with above. She reports a history of an idiopathic atrial arrythmia, but this has never been diagnosed as Atrial fibrillation.  Presents with a bout of AF, now back in SR.  Start cardizem 30 mg po q6hr.   Watch on tele  Continue Lovenox for now. At time of d/c she should be on Eliquis 5 mg po bid (CHADS2-vasc 2)  Echocardiogram to r/o effusion and to evaluate for mitral disease

## 2018-07-12 NOTE — ED ADULT NURSE NOTE - CHPI ED SYMPTOMS NEG
no chills/no cough/no nausea/no vomiting/no back pain/no syncope/no chest pain/no diaphoresis/no fever

## 2018-07-12 NOTE — PROGRESS NOTE ADULT - PROBLEM SELECTOR PLAN 2
Stable  Follow up with cardio Dr. Shipley'  Follows Dr. Brown outpatient Stable- follow K,Mag,.Phos level  Follow up with cardio Dr. Shipley'  Follows Dr. Brown outpatient

## 2018-07-12 NOTE — H&P ADULT - NEUROLOGICAL DETAILS
sensation intact/normal strength/alert and oriented x 3 sensation intact/cranial nerves intact/normal strength/alert and oriented x 3

## 2018-07-12 NOTE — ED ADULT NURSE NOTE - OBJECTIVE STATEMENT
Patient came in to ED c/o palpitations 45 minutes prior to consultation with lightheadedness and mild shortness of breath. Patient denies chest pain, no nausea or vomiting.

## 2018-07-12 NOTE — H&P ADULT - NSHPSOCIALHISTORY_GEN_ALL_CORE
Denies Denies tobacco or illicit drugs  Social ETOH  Lives with .   mammo uptodate - repeat in 11/2018  colonoscopy uptodate - found 1 benign polyp  flu uptodate  pending PNA Denies tobacco or illicit drugs  Social ETOH  Lives with .   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   mammogram uptodate - repeat in 11/2018  colonoscopy uptodate - found 1 benign polyp  flu uptodate  pending PNA

## 2018-07-12 NOTE — PROGRESS NOTE ADULT - SUBJECTIVE AND OBJECTIVE BOX
Patient is a 67y old  Female who presents with a chief complaint of Palpitations (12 Jul 2018 00:56)      INTERVAL HPI:    68 y/o F PMHX idiopathic ventricular tachycardia, breast cancer 9/2016 s/p surgery and radiation, chronic fatigue syndrome, degenerative disc disease, genital herpes, hiatal hernia presents with palpitations x2 days. Patient states the episodes of palpitations have been intermittent. Reports feeling her heart pounding against her chest last a few minutes at a time and occur at rest and movement. These episodes are similar to the patient's episode of idiopathic v tach about 10 years ago. At that time, patient experienced palpitations, but converted back to sinus. Patient states she has had these episodes on and off through the years, but they are becoming more frequent. Last episode was in 1/2018. At that time, patient was placed on a holter monitor for 30 days without any events. (+) chronic hot flashes. Denies fever, chills, SOB, CP, cough, abd pain, N/V/D, or urinary symptoms. No recent travel. No sick contacts. No change in weight.    In the ED, VS: afebrile, , /90, RR 18, sat 100% RA, cbc unremarkable, D-dimer neg, cmp unremarkable except for gluc 140, neg cardiac enzymes, EKG afib with RVR @ 173BPM. Received IV cardizem 10mg x2 and started on cardizem infusion. Cardio contacted in the ED. CXR prelim neg. In ED, patient became SOB and 's while on cardizem drip @ 15. Repeat ekg similar to prior - afib @ 130s. Called Dr. Myers - likely anxiety. Gave flexeril for anxiety (patient unable to take benzos).     7/12/18: Patient seen and examined at bedside. Patient reports minor headache and says her HR feels fast but no other complaints. Patient converted to NSR per tele and transitioned from Cardizem drip to PO. Troponins neg x 3. Cardiology following.       OVERNIGHT EVENTS: No acute events overnight.     Home Medications:  acyclovir 400 mg oral tablet: 1 tab(s) orally 2 times a day (12 Jul 2018 02:14)  cyclobenzaprine 5 mg oral tablet: 1 tab(s) orally 3 times a day, As Needed (12 Jul 2018 02:14)  Tylenol PM 1000 mg-50 mg/30 mL oral liquid: 30 milliliter(s) orally once a day (at bedtime) (12 Jul 2018 02:14)      MEDICATIONS  (STANDING):  acyclovir   Tablet 400 milliGRAM(s) Oral two times a day  diltiazem    Tablet 30 milliGRAM(s) Oral every 6 hours  enoxaparin Injectable 60 milliGRAM(s) SubCutaneous every 12 hours  pantoprazole    Tablet 40 milliGRAM(s) Oral before breakfast  potassium chloride    Tablet ER 40 milliEquivalent(s) Oral every 4 hours    MEDICATIONS  (PRN):  acetaminophen   Tablet. 650 milliGRAM(s) Oral at bedtime PRN insomnia  diphenhydrAMINE   Capsule 25 milliGRAM(s) Oral at bedtime PRN Insomnia      Allergies    adhesives (Unknown)  Cipro (Hepatotoxicity)  sulfa drugs (Rash)  tetracycline (Rash)  Valium (Short breath; Faint; Other)    Intolerances    Gluten (Stomach Upset)      REVIEW OF SYSTEMS:  CONSTITUTIONAL: No fever, No chills, No fatigue, No myalgia, No Body ache, No Weakness  EYES: No eye pain,  No visual disturbances, No discharge, NO Redness  ENMT:  No ear pain, No nose bleed, No vertigo; No sinus or throat pain, No Congestion  NECK: No pain, No stiffness  RESPIRATORY: No cough, wheezing, No  hemoptysis, No shortness of breath  CARDIOVASCULAR: No chest pain, palpitations  GASTROINTESTINAL: No abdominal or epigastric pain. No nausea, No vomiting; No diarrhea or constipation, increased gas [ x ] BM  GENITOURINARY: No dysuria, No frequency, No urgency, No hematuria, or incontinence  NEUROLOGICAL: Intermittent headache, No dizziness, No numbness, No tingling, No tremors, No weakness  EXT: No Swelling, No Pain, No Edema  SKIN:  [ x ] No itching, burning, rashes, or lesions   MUSCULOSKELETAL: No joint pain or swelling; No muscle pain, No back pain, cramps in b/l legs  PSYCHIATRIC: No depression, anxiety, mood swings or difficulty sleeping at night  PAIN SCALE: [ x ] None  [  ] Other-  ROS Unable to obtain due to - [  ] Dementia  [  ] Lethargy  [  ] Sedated [  ] non verbal  REST OF REVIEW Of SYSTEM - [ x ] Normal     Vital Signs Last 24 Hrs  T(C): 36.7 (12 Jul 2018 16:31), Max: 36.9 (12 Jul 2018 02:15)  T(F): 98.1 (12 Jul 2018 16:31), Max: 98.5 (12 Jul 2018 05:07)  HR: 80 (12 Jul 2018 16:31) (80 - 178)  BP: 104/69 (12 Jul 2018 16:31) (89/62 - 150/87)  BP(mean): --  RR: 18 (12 Jul 2018 16:31) (16 - 18)  SpO2: 97% (12 Jul 2018 16:31) (97% - 100%)  Finger Stick        07-11 @ 07:01  -  07-12 @ 07:00  --------------------------------------------------------  IN: 350 mL / OUT: 340 mL / NET: 10 mL    07-12 @ 07:01  -  07-12 @ 17:01  --------------------------------------------------------  IN: 200 mL / OUT: 0 mL / NET: 200 mL        PHYSICAL EXAM:  GENERAL:  [ x ] NAD , [ x ] well appearing, [  ] Agitated, [  ] Drowsy,  [  ] Lethargy, [  ] confused   HEAD:  [ x ] Normal, [  ] Other  EYES:  [ x ] EOMI, [ x ] PERRLA, [ x ] conjunctiva and sclera clear normal, [  ] Other,  [  ] Pallor,[  ] Discharge  ENMT:  [ x ] Normal, [ x ] Moist mucous membranes, [ x ] Good dentition, [ x ] No Thrush  NECK:  [ x ] Supple, [ x ] No JVD, [ x ] Normal thyroid, [  ] Lymphadenopathy [  ] Other  CHEST/LUNG:  [ x ] Clear to auscultation bilaterally, [ x ] Breath Sounds equal,  [ x ] No rales, [ x ] No rhonchi  [ x ]  No wheezing  HEART:  [ x ] Regular rhythm, [ x ] tachycardia, [  ] Bradycardia,  [  ] irregular  [ x ] No murmurs, No rubs, No gallops, [  ] PPM in place (Mfr:  )  ABDOMEN:  [ x ] Soft, [ x ] Nontender, [ x ] Nondistended, [ x ] No mass, [ x ] Bowel sounds present, [  ] obese  NERVOUS SYSTEM:  [ x ] Alert & Oriented X3, [ x ] Nonfocal  [  ] Confusion  [  ] Encephalopathic [  ] Sedated [  ] Unable to assess, [  ] Other-  EXTREMITIES: [ x ] 2+ Peripheral Pulses, No clubbing, No cyanosis,  [  ] edema B/L lower EXT. [  ] PVD stasis skin changes B/L Lower EXT  LYMPH: No lymphadenopathy noted  SKIN:  [ x ] No rashes or lesions, [  ] Pressure Ulcers, [  ] ecchymosis, [  ] Skin Tears, [  ] Other    DIET: DASH/TLC    LABS:                        13.3   5.87  )-----------( 192      ( 12 Jul 2018 06:28 )             41.1     12 Jul 2018 06:28    145    |  111    |  13     ----------------------------<  96     3.5     |  25     |  0.58     Ca    8.2        12 Jul 2018 06:28  Phos  3.2       12 Jul 2018 06:28  Mg     2.2       12 Jul 2018 06:28    TPro  7.9    /  Alb  4.2    /  TBili  0.3    /  DBili  x      /  AST  29     /  ALT  28     /  AlkPhos  97     12 Jul 2018 00:12                  CARDIAC MARKERS ( 12 Jul 2018 12:40 )  <.015 ng/mL / x     / 114 U/L / x     / 1.7 ng/mL  CARDIAC MARKERS ( 12 Jul 2018 06:28 )  <.015 ng/mL / x     / 129 U/L / x     / 2.1 ng/mL  CARDIAC MARKERS ( 12 Jul 2018 00:12 )  <.015 ng/mL / x     / 170 U/L / x     / 2.1 ng/mL             Anemia Panel:      Thyroid Panel:  T4, Serum: 6.8 ug/dL (07-12-18 @ 07:42)  Thyroid Stimulating Hormone, Serum: 0.85 uIU/mL (07-12-18 @ 06:28)                RADIOLOGY & ADDITIONAL TESTS:    CXR: Clear lungs      HEALTH ISSUES - PROBLEM Dx:  Prophylactic measure: Prophylactic measure  Genital herpes: Genital herpes  Chronic Fatigue Syndrome: Chronic Fatigue Syndrome  Idiopathic Ventricular Tachycardia: Idiopathic Ventricular Tachycardia  New onset atrial fibrillation: New onset atrial fibrillation          Consultant(s) Notes Reviewed:  [ x ] YES     Care Discussed with [X] Consultants  [ x ] Patient  [  ] Family  [  ]   [  ] Social Service  [ x ] RN, [  ] Physical Therapy  DVT PPX: [ x ] Lovenox, [  ] S C Heparin, [  ] Coumadin, [  ] Xarelto, [  ] Eliquis, [  ] Pradaxa, [  ] IV Heparin drip, [  ] SCD [  ] Contraindication 2 to GI Bleed,[  ] Ambulation  Advanced directive: [ x ] None, [  ] DNR/DNI Patient is a 67y old  Female who presents with a chief complaint of Palpitations (12 Jul 2018 00:56)      INTERVAL HPI:    68 y/o F PMHX idiopathic ventricular tachycardia, breast cancer 9/2016 s/p surgery and radiation, chronic fatigue syndrome, degenerative disc disease, genital herpes, hiatal hernia presents with palpitations x2 days. Patient states the episodes of palpitations have been intermittent. Reports feeling her heart pounding against her chest last a few minutes at a time and occur at rest and movement. These episodes are similar to the patient's episode of idiopathic v tach about 10 years ago. At that time, patient experienced palpitations, but converted back to sinus. Patient states she has had these episodes on and off through the years, but they are becoming more frequent. Last episode was in 1/2018. At that time, patient was placed on a holter monitor for 30 days without any events. (+) chronic hot flashes. Denies fever, chills, SOB, CP, cough, abd pain, N/V/D, or urinary symptoms. No recent travel. No sick contacts. No change in weight.    In the ED, VS: afebrile, , /90, RR 18, sat 100% RA, cbc unremarkable, D-dimer neg, cmp unremarkable except for gluc 140, neg cardiac enzymes, EKG afib with RVR @ 173BPM. Received IV cardizem 10mg x2 and started on cardizem infusion. Cardio contacted in the ED. CXR prelim neg. In ED, patient became SOB and 's while on cardizem drip @ 15. Repeat ekg similar to prior - afib @ 130s. Called Dr. Myers - likely anxiety. Gave flexeril for anxiety (patient unable to take benzos).     7/12/18: Patient seen and examined at bedside. Patient reports minor headache and says her HR feels fast but no other complaints. Patient converted to NSR per tele and transitioned from Cardizem drip to PO. Troponins neg x 3. Cardiology following. S/P pt converted to NSR, Now on PO Cardizem , Full dose Lovenox 60 mg SC q 12 hrs as per cardiology,       OVERNIGHT EVENTS: No acute events overnight.     Home Medications:  acyclovir 400 mg oral tablet: 1 tab(s) orally 2 times a day (12 Jul 2018 02:14)  cyclobenzaprine 5 mg oral tablet: 1 tab(s) orally 3 times a day, As Needed (12 Jul 2018 02:14)  Tylenol PM 1000 mg-50 mg/30 mL oral liquid: 30 milliliter(s) orally once a day (at bedtime) (12 Jul 2018 02:14)      MEDICATIONS  (STANDING):  acyclovir   Tablet 400 milliGRAM(s) Oral two times a day  diltiazem    Tablet 30 milliGRAM(s) Oral every 6 hours  enoxaparin Injectable 60 milliGRAM(s) SubCutaneous every 12 hours  pantoprazole    Tablet 40 milliGRAM(s) Oral before breakfast  potassium chloride    Tablet ER 40 milliEquivalent(s) Oral every 4 hours    MEDICATIONS  (PRN):  acetaminophen   Tablet. 650 milliGRAM(s) Oral at bedtime PRN insomnia  diphenhydrAMINE   Capsule 25 milliGRAM(s) Oral at bedtime PRN Insomnia      Allergies    adhesives (Unknown)  Cipro (Hepatotoxicity)  sulfa drugs (Rash)  tetracycline (Rash)  Valium (Short breath; Faint; Other)    Intolerances    Gluten (Stomach Upset)      REVIEW OF SYSTEMS: I feel fine , want to go home  CONSTITUTIONAL: No fever, No chills, No fatigue, No myalgia, No Body ache, No Weakness  EYES: No eye pain,  No visual disturbances, No discharge, NO Redness  ENMT:  No ear pain, No nose bleed, No vertigo; No sinus or throat pain, No Congestion  NECK: No pain, No stiffness  RESPIRATORY: No cough, wheezing, No  hemoptysis, No shortness of breath  CARDIOVASCULAR: No chest pain, palpitations  GASTROINTESTINAL: No abdominal or epigastric pain. No nausea, No vomiting; No diarrhea or constipation, increased gas [ x ] BM  GENITOURINARY: No dysuria, No frequency, No urgency, No hematuria, or incontinence  NEUROLOGICAL: Intermittent headache, No dizziness, No numbness, No tingling, No tremors, No weakness  EXT: No Swelling, No Pain, No Edema  SKIN:  [ x ] No itching, burning, rashes, or lesions   MUSCULOSKELETAL: No joint pain or swelling; No muscle pain, No back pain, cramps in b/l legs  PSYCHIATRIC: No depression, anxiety, mood swings or difficulty sleeping at night  PAIN SCALE: [ x ] None  [  ] Other-  ROS Unable to obtain due to - [  ] Dementia  [  ] Lethargy  [  ] Sedated [  ] non verbal  REST OF REVIEW Of SYSTEM - [ x ] Normal     Vital Signs Last 24 Hrs  T(C): 36.7 (12 Jul 2018 16:31), Max: 36.9 (12 Jul 2018 02:15)  T(F): 98.1 (12 Jul 2018 16:31), Max: 98.5 (12 Jul 2018 05:07)  HR: 80 (12 Jul 2018 16:31) (80 - 178)  BP: 104/69 (12 Jul 2018 16:31) (89/62 - 150/87)  BP(mean): --  RR: 18 (12 Jul 2018 16:31) (16 - 18)  SpO2: 97% (12 Jul 2018 16:31) (97% - 100%)  Finger Stick        07-11 @ 07:01  -  07-12 @ 07:00  --------------------------------------------------------  IN: 350 mL / OUT: 340 mL / NET: 10 mL    07-12 @ 07:01  -  07-12 @ 17:01  --------------------------------------------------------  IN: 200 mL / OUT: 0 mL / NET: 200 mL        PHYSICAL EXAM:OOB to chair  GENERAL:  [ x ] NAD , [ x ] well appearing, [  ] Agitated, [  ] Drowsy,  [  ] Lethargy, [  ] confused   HEAD:  [ x ] Normal, [  ] Other  EYES:  [ x ] EOMI, [ x ] PERRLA, [ x ] conjunctiva and sclera clear normal, [  ] Other,  [  ] Pallor,[  ] Discharge  ENMT:  [ x ] Normal, [ x ] Moist mucous membranes, [ x ] Good dentition, [ x ] No Thrush  NECK:  [ x ] Supple, [ x ] No JVD, [ x ] Normal thyroid, [  ] Lymphadenopathy [  ] Other  CHEST/LUNG:  [ x ] Clear to auscultation bilaterally, [ x ] Breath Sounds equal,  [ x ] No rales, [ x ] No rhonchi  [ x ]  No wheezing  HEART:  [ x ] Regular rhythm, [ x ] tachycardia, [  ] Bradycardia,  [  ] irregular  [ x ] No murmurs, No rubs, No gallops, [  ] PPM in place (Mfr:  )  ABDOMEN:  [ x ] Soft, [ x ] Nontender, [ x ] Nondistended, [ x ] No mass, [ x ] Bowel sounds present, [  ] obese  NERVOUS SYSTEM:  [ x ] Alert & Oriented X3, [ x ] Nonfocal  [  ] Confusion  [  ] Encephalopathic [  ] Sedated [  ] Unable to assess, [  ] Other-  EXTREMITIES: [ x ] 2+ Peripheral Pulses, No clubbing, No cyanosis,  [  ] edema B/L lower EXT. [  ] PVD stasis skin changes B/L Lower EXT  LYMPH: No lymphadenopathy noted  SKIN:  [ x ] No rashes or lesions, [  ] Pressure Ulcers, [  ] ecchymosis, [  ] Skin Tears, [  ] Other    DIET: DASH/TLC    LABS:                        13.3   5.87  )-----------( 192      ( 12 Jul 2018 06:28 )             41.1     12 Jul 2018 06:28    145    |  111    |  13     ----------------------------<  96     3.5     |  25     |  0.58     Ca    8.2        12 Jul 2018 06:28  Phos  3.2       12 Jul 2018 06:28  Mg     2.2       12 Jul 2018 06:28    TPro  7.9    /  Alb  4.2    /  TBili  0.3    /  DBili  x      /  AST  29     /  ALT  28     /  AlkPhos  97     12 Jul 2018 00:12    CARDIAC MARKERS ( 12 Jul 2018 12:40 )  <.015 ng/mL / x     / 114 U/L / x     / 1.7 ng/mL  CARDIAC MARKERS ( 12 Jul 2018 06:28 )  <.015 ng/mL / x     / 129 U/L / x     / 2.1 ng/mL  CARDIAC MARKERS ( 12 Jul 2018 00:12 )  <.015 ng/mL / x     / 170 U/L / x     / 2.1 ng/mL  :      Thyroid Panel:  T4, Serum: 6.8 ug/dL (07-12-18 @ 07:42)  Thyroid Stimulating Hormone, Serum: 0.85 uIU/mL (07-12-18 @ 06:28)      RADIOLOGY & ADDITIONAL TESTS:NONE    CXR: Clear lungs      HEALTH ISSUES - PROBLEM Dx:  Prophylactic measure: Prophylactic measure  Genital herpes: Genital herpes  Chronic Fatigue Syndrome: Chronic Fatigue Syndrome  Idiopathic Ventricular Tachycardia: Idiopathic Ventricular Tachycardia  New onset atrial fibrillation: New onset atrial fibrillation      Consultant(s) Notes Reviewed:  [ x ] YES     Care Discussed with [X] Consultants  [ x ] Patient  [ x ] Family  [  ]   [  ] Social Service  [ x ] RN, [  ] Physical Therapy  DVT PPX: [ x ] Lovenox, [  ] S C Heparin, [  ] Coumadin, [  ] Xarelto, [  ] Eliquis, [  ] Pradaxa, [  ] IV Heparin drip, [  ] SCD [  ] Contraindication 2 to GI Bleed,[  ] Ambulation  Advanced directive: [ x ] None, [  ] DNR/DNI

## 2018-07-12 NOTE — ED PROVIDER NOTE - ENMT, MLM
Airway patent, Nasal mucosa clear. Mouth with normal mucosa. Throat has no vesicles, no oropharyngeal exudates and uvula is midline. MM Moist. neck supple. no meningeal signs.

## 2018-07-12 NOTE — PROGRESS NOTE ADULT - PROBLEM SELECTOR PLAN 1
Monitored on tele- Converted to NSR overnight  d-dimer neg  s/p cardizem drip- Patient on PO cardizem 30 mg Q6H  s/p flexeril x1 for anxiety- Pt cannot tolerate benzos  CHADSVASC score 2 (67Y F)   protonix   TTE to rule out mitral disease and effusion- F/u results  EKG unchanged from previous  Cardiac enzymes negative x 3  Thyroid function tests normal  Monitor and replete lytes, keep K>4, Mg>2  Continue home BP meds  Cardiology following, Dr. Shipley Monitored on tele- Converted to NSR overnight  d-dimer neg  s/p Cardizem drip- Patient on PO Cardizem 30 mg Q6H  D/W Dr Shipley- Start Cardizem  mg daily, Elaquis 5 mg q 12 hrs  s/p flexeril x1 for anxiety- Pt cannot tolerate benzos  CHADSVASC score 2 (67Y F)   Protonix 40 mg daily   TTE to rule out mitral disease and effusion- F/u results  EKG unchanged from previous  Cardiac enzymes negative x 3  Thyroid function tests normal  Monitor and replete lytes, keep K>4, Mg>2  Continue home BP meds  Cardiology following, Dr. Shipley

## 2018-07-12 NOTE — H&P ADULT - RS GEN PE MLT RESP DETAILS PC
breath sounds equal/respirations non-labored/clear to auscultation bilaterally breath sounds equal/no wheezes/respirations non-labored/normal/no rhonchi/clear to auscultation bilaterally/no rales

## 2018-07-12 NOTE — H&P ADULT - PROBLEM SELECTOR PLAN 1
Admit to tele  s/p IV cardizem 10mg x 2 and lovenox 60mg  started on cardizem drip  CHADSVAC  TTE in am  trend cardiac enzymes Admit to tele  s/p IV cardizem 10mg x 2 and lovenox 60mg  started on cardizem drip  CHADSVASC score 0 - low risk no A/C therapy  trend cardiac enzymes x 3  f/u TSH, T3, T3  consult Dr. Shipley Admit to tele  d-dimer neg  s/p IV cardizem 10mg x 2 and lovenox 60mg  started on cardizem drip @ 15mg/hr  s/p flexeril x1 for anxiety  CHADSVASC score 0 - low risk no A/C therapy  TTE in am  trend cardiac enzymes x 3  f/u TSH, T3, T3  consult Dr. Shipley Admit to tele  d-dimer neg  s/p IV cardizem 10mg x 2 and lovenox 60mg  started on cardizem drip @ 15mg/hr  s/p flexeril x1 for anxiety  CHADSVASC score 0 - low risk no A/C therapy  protonix   TTE in am  trend cardiac enzymes x 3  f/u TSH, T3, T3  consult Dr. Shipley Admit to tele  d-dimer neg  s/p IV Cardizem 10mg x 2 and Lovenox 60mg  started on Cardizem drip @ 15mg/hr  s/p flexeril x1 for anxiety  CHADSVASC score 0 - low risk no A/C therapy  protonix   TTE in am  trend cardiac enzymes x 3  f/u TSH, T3, T3  consult Dr. Shipley

## 2018-07-12 NOTE — H&P ADULT - PROBLEM SELECTOR PLAN 3
Stable - continue with tylenol pm Stable - continue with tylenol pm  (tylenol and benadryl) Stable - continue with acyclovir 2x day

## 2018-07-12 NOTE — ED PROVIDER NOTE - PROGRESS NOTE DETAILS
Pt doing well, HR better controlled, no acute changes. Pt doing well, HR now ~ 120 - 130, will cont to monitor. Dw Dr WILFRID Myers, will see pt to admit.

## 2018-07-13 ENCOUNTER — TRANSCRIPTION ENCOUNTER (OUTPATIENT)
Age: 68
End: 2018-07-13

## 2018-07-13 VITALS
DIASTOLIC BLOOD PRESSURE: 66 MMHG | TEMPERATURE: 98 F | RESPIRATION RATE: 18 BRPM | HEART RATE: 81 BPM | OXYGEN SATURATION: 99 % | SYSTOLIC BLOOD PRESSURE: 98 MMHG

## 2018-07-13 LAB
ALBUMIN SERPL ELPH-MCNC: 3.6 G/DL — SIGNIFICANT CHANGE UP (ref 3.3–5)
ALP SERPL-CCNC: 85 U/L — SIGNIFICANT CHANGE UP (ref 40–120)
ALT FLD-CCNC: 24 U/L — SIGNIFICANT CHANGE UP (ref 12–78)
ANION GAP SERPL CALC-SCNC: 7 MMOL/L — SIGNIFICANT CHANGE UP (ref 5–17)
AST SERPL-CCNC: 22 U/L — SIGNIFICANT CHANGE UP (ref 15–37)
BILIRUB SERPL-MCNC: 0.6 MG/DL — SIGNIFICANT CHANGE UP (ref 0.2–1.2)
BUN SERPL-MCNC: 13 MG/DL — SIGNIFICANT CHANGE UP (ref 7–23)
CALCIUM SERPL-MCNC: 9 MG/DL — SIGNIFICANT CHANGE UP (ref 8.5–10.1)
CHLORIDE SERPL-SCNC: 107 MMOL/L — SIGNIFICANT CHANGE UP (ref 96–108)
CO2 SERPL-SCNC: 30 MMOL/L — SIGNIFICANT CHANGE UP (ref 22–31)
CREAT SERPL-MCNC: 0.74 MG/DL — SIGNIFICANT CHANGE UP (ref 0.5–1.3)
GLUCOSE SERPL-MCNC: 80 MG/DL — SIGNIFICANT CHANGE UP (ref 70–99)
HCT VFR BLD CALC: 40.9 % — SIGNIFICANT CHANGE UP (ref 34.5–45)
HGB BLD-MCNC: 13.2 G/DL — SIGNIFICANT CHANGE UP (ref 11.5–15.5)
MCHC RBC-ENTMCNC: 28.8 PG — SIGNIFICANT CHANGE UP (ref 27–34)
MCHC RBC-ENTMCNC: 32.3 GM/DL — SIGNIFICANT CHANGE UP (ref 32–36)
MCV RBC AUTO: 89.3 FL — SIGNIFICANT CHANGE UP (ref 80–100)
NRBC # BLD: 0 /100 WBCS — SIGNIFICANT CHANGE UP (ref 0–0)
PLATELET # BLD AUTO: 173 K/UL — SIGNIFICANT CHANGE UP (ref 150–400)
POTASSIUM SERPL-MCNC: 4.6 MMOL/L — SIGNIFICANT CHANGE UP (ref 3.5–5.3)
POTASSIUM SERPL-SCNC: 4.6 MMOL/L — SIGNIFICANT CHANGE UP (ref 3.5–5.3)
PROT SERPL-MCNC: 7 G/DL — SIGNIFICANT CHANGE UP (ref 6–8.3)
RBC # BLD: 4.58 M/UL — SIGNIFICANT CHANGE UP (ref 3.8–5.2)
RBC # FLD: 14.1 % — SIGNIFICANT CHANGE UP (ref 10.3–14.5)
SODIUM SERPL-SCNC: 144 MMOL/L — SIGNIFICANT CHANGE UP (ref 135–145)
WBC # BLD: 4.49 K/UL — SIGNIFICANT CHANGE UP (ref 3.8–10.5)
WBC # FLD AUTO: 4.49 K/UL — SIGNIFICANT CHANGE UP (ref 3.8–10.5)

## 2018-07-13 PROCEDURE — 93005 ELECTROCARDIOGRAM TRACING: CPT

## 2018-07-13 PROCEDURE — 82550 ASSAY OF CK (CPK): CPT

## 2018-07-13 PROCEDURE — 85379 FIBRIN DEGRADATION QUANT: CPT

## 2018-07-13 PROCEDURE — 96376 TX/PRO/DX INJ SAME DRUG ADON: CPT

## 2018-07-13 PROCEDURE — 82553 CREATINE MB FRACTION: CPT

## 2018-07-13 PROCEDURE — 84443 ASSAY THYROID STIM HORMONE: CPT

## 2018-07-13 PROCEDURE — 36415 COLL VENOUS BLD VENIPUNCTURE: CPT

## 2018-07-13 PROCEDURE — 71045 X-RAY EXAM CHEST 1 VIEW: CPT

## 2018-07-13 PROCEDURE — 93306 TTE W/DOPPLER COMPLETE: CPT

## 2018-07-13 PROCEDURE — 80053 COMPREHEN METABOLIC PANEL: CPT

## 2018-07-13 PROCEDURE — 85027 COMPLETE CBC AUTOMATED: CPT

## 2018-07-13 PROCEDURE — 96372 THER/PROPH/DIAG INJ SC/IM: CPT | Mod: XU

## 2018-07-13 PROCEDURE — 83735 ASSAY OF MAGNESIUM: CPT

## 2018-07-13 PROCEDURE — 84436 ASSAY OF TOTAL THYROXINE: CPT

## 2018-07-13 PROCEDURE — 96374 THER/PROPH/DIAG INJ IV PUSH: CPT

## 2018-07-13 PROCEDURE — 84484 ASSAY OF TROPONIN QUANT: CPT

## 2018-07-13 PROCEDURE — 80048 BASIC METABOLIC PNL TOTAL CA: CPT

## 2018-07-13 PROCEDURE — 99285 EMERGENCY DEPT VISIT HI MDM: CPT | Mod: 25

## 2018-07-13 PROCEDURE — 99232 SBSQ HOSP IP/OBS MODERATE 35: CPT

## 2018-07-13 PROCEDURE — 84480 ASSAY TRIIODOTHYRONINE (T3): CPT

## 2018-07-13 PROCEDURE — 84100 ASSAY OF PHOSPHORUS: CPT

## 2018-07-13 RX ORDER — DILTIAZEM HCL 120 MG
1 CAPSULE, EXT RELEASE 24 HR ORAL
Qty: 30 | Refills: 0 | OUTPATIENT
Start: 2018-07-13 | End: 2018-08-11

## 2018-07-13 RX ORDER — APIXABAN 2.5 MG/1
1 TABLET, FILM COATED ORAL
Qty: 0 | Refills: 0 | COMMUNITY
Start: 2018-07-13

## 2018-07-13 RX ADMIN — PANTOPRAZOLE SODIUM 40 MILLIGRAM(S): 20 TABLET, DELAYED RELEASE ORAL at 05:24

## 2018-07-13 RX ADMIN — APIXABAN 5 MILLIGRAM(S): 2.5 TABLET, FILM COATED ORAL at 12:06

## 2018-07-13 RX ADMIN — Medication 120 MILLIGRAM(S): at 05:25

## 2018-07-13 RX ADMIN — APIXABAN 5 MILLIGRAM(S): 2.5 TABLET, FILM COATED ORAL at 01:16

## 2018-07-13 RX ADMIN — Medication 400 MILLIGRAM(S): at 05:25

## 2018-07-13 NOTE — PROGRESS NOTE ADULT - PROBLEM SELECTOR PLAN 1
Currently NSR  D-dimer neg  D/W Dr Shipley- Start Cardizem  mg daily, Eliquis 5 mg q 12 hrs  CHADSVASC score 2 (67Y F)   Protonix 40 mg daily   TTE to rule out mitral disease and effusion- F/u results  EKG unchanged from previous  Cardiac enzymes negative x 3  Thyroid function tests normal  Monitor and replete lytes, keep K>4, Mg>2  Continue home BP meds  Cardiology following, Dr. Shipley Currently NSR, S/p IV cardizem drip  D-dimer neg  D/W Dr Shipley- Start Cardizem  mg daily, Eliquis 5 mg q 12 hrs  CHADSVASC score 2 (67Y F)   Protonix 40 mg daily   TTE to rule out mitral disease and effusion- F/u results  EKG unchanged from previous  Cardiac enzymes negative x 3  Thyroid function tests normal  Monitor and replete lytes, keep K>4, Mg>2  Continue home BP meds  Cardiology following, Dr. Sanchez, stable for D/C home today on AC

## 2018-07-13 NOTE — PROGRESS NOTE ADULT - ATTENDING COMMENTS
I saw and examined the patient personally 7/13. Spoke with above provider regarding this case. I reviewed the above findings completely.  I agree with the above history, physical, and plan which I have edited where appropriate.
Pt seen, Examined, case & care plan d/w pt, residents, cardiology at detail,   D/C pt Home, Total D/c care time is 40 minutes.
Pt seen, Examined, case & care plan d/w pt, residents & DR Shipley at detail.  D/C planning in AM

## 2018-07-13 NOTE — PROGRESS NOTE ADULT - PROBLEM SELECTOR PLAN 5
DVT PPX - s/p lovenox 60mg - started on lovenox 40mg daily for dvt ppx  fall risk, ambulate with assistance, OOB with assistance
DVT PPX - s/p lovenox 60mg - started on lovenox 40mg daily for dvt ppx  fall risk, ambulate with assistance, OOB with assistance

## 2018-07-13 NOTE — DISCHARGE NOTE ADULT - CARE PROVIDER_API CALL
Goran Bennett  Phone: [phone]  Fax: (   )    -    Nicholas Brown), Cardiovascular Disease; Internal Medicine; Nuclear Cardiology  [address] [id number]  Phone: [phone]  Fax: [phone] Nicholas Brown (MD), Cardiovascular Disease; Internal Medicine; Nuclear Cardiology  [address] [facility]  [address]  [place], [place] [id number]  Phone: [phone]  Fax: [phone]    Goran Bennett  Phone: [phone]  Fax: (   )    -    Sergio Choi), Cardiovascular Disease  [address] [place] [address]  Phone: [phone]  Fax: [phone]

## 2018-07-13 NOTE — PROGRESS NOTE ADULT - PROBLEM SELECTOR PLAN 2
Stable- follow K,Mag,.Phos level  Follow up with cardio Dr. Shipley'  Follows Dr. Brown outpatient Stable-  K,Mag,.Phos level  Follow up with cardio Dr. Shipley'  Follows Dr. Brown outpatient

## 2018-07-13 NOTE — DISCHARGE NOTE ADULT - PROVIDER TOKENS
FREE:[LAST:[[place]],FIRST:[Goran],PHONE:[phone]],FAX:[(   )    -]],TOKEN:'[id number]' TOKEN:'[id number]',FREE:[LAST:[[place]],FIRST:[Goran],PHONE:[phone]],FAX:[(   )    -]],TOKEN:'[id number]'
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
## 2018-07-13 NOTE — DISCHARGE NOTE ADULT - MEDICATION SUMMARY - MEDICATIONS TO TAKE
I will START or STAY ON the medications listed below when I get home from the hospital:    Tylenol PM 1000 mg-50 mg/30 mL oral liquid  -- 30 milliliter(s) by mouth once a day (at bedtime)  -- Indication: For Chronic Fatigue Syndrome    dilTIAZem 120 mg/24 hours oral capsule, extended release  -- 1 cap(s) by mouth once a day  -- Indication: For Atrial fibrillation    apixaban 5 mg oral tablet  -- 1 tab(s) by mouth every 12 hours  -- Indication: For Atrial fibrillation    acyclovir 400 mg oral tablet  -- 1 tab(s) by mouth 2 times a day  -- Indication: For Genital herpes    cyclobenzaprine 5 mg oral tablet  -- 1 tab(s) by mouth 3 times a day, As Needed  -- Indication: For Chronic Fatigue Syndrome

## 2018-07-13 NOTE — DISCHARGE NOTE ADULT - PLAN OF CARE
rate control - continue Diltiazem  daily for heart rate control  - continue eliquis for anticoagulation  - follow up with your cardiologist Dr. Brown, within 1 week of discharge from hospital. - stable.  Continue Tylenol as needed for pain relief.  - follow up with Dr. Bennett within 1 week of discharge - continue acyclovir as directed  - follow up with Dr. Bennett within 1 week of discharge -S/P IV Cardizem drip given in hospital, converted to Sinus rhythm -HR stable  - continue Diltiazem  daily for heart rate control  - continue Eliquis 5 mg 2x day  for anticoagulation  - follow up with your cardiologist Dr. Brown, within 1 week of discharge from hospital.

## 2018-07-13 NOTE — PROGRESS NOTE ADULT - ASSESSMENT
66 y/o F PMHX idiopathic ventricular tachycardia, breast cancer 9/2016 s/p surgery and radiation, chronic fatigue syndrome, degenerative disc disease, genital herpes, hiatal hernia admitted with new onset afib w/rvr. Now in NSR.

## 2018-07-13 NOTE — DISCHARGE NOTE ADULT - CARE PLAN
Principal Discharge DX:	New onset atrial fibrillation  Goal:	rate control  Assessment and plan of treatment:	- continue Diltiazem  daily for heart rate control  - continue eliquis for anticoagulation  - follow up with your cardiologist Dr. Brown, within 1 week of discharge from hospital.  Secondary Diagnosis:	Chronic Fatigue Syndrome  Assessment and plan of treatment:	- stable.  Continue Tylenol as needed for pain relief.  - follow up with Dr. Bennett within 1 week of discharge  Secondary Diagnosis:	Genital herpes simplex, unspecified site  Assessment and plan of treatment:	- continue acyclovir as directed  - follow up with Dr. Bennett within 1 week of discharge Principal Discharge DX:	New onset atrial fibrillation  Goal:	rate control  Assessment and plan of treatment:	-S/P IV Cardizem drip given in hospital, converted to Sinus rhythm -HR stable  - continue Diltiazem  daily for heart rate control  - continue Eliquis 5 mg 2x day  for anticoagulation  - follow up with your cardiologist Dr. Brown, within 1 week of discharge from hospital.  Secondary Diagnosis:	Chronic Fatigue Syndrome  Assessment and plan of treatment:	- stable.  Continue Tylenol as needed for pain relief.  - follow up with Dr. Bennett within 1 week of discharge  Secondary Diagnosis:	Genital herpes simplex, unspecified site  Assessment and plan of treatment:	- continue acyclovir as directed  - follow up with Dr. Bennett within 1 week of discharge

## 2018-07-13 NOTE — DISCHARGE NOTE ADULT - PATIENT PORTAL LINK FT
You can access the [facility], offered by [facility], by registering with the following website: [email or web address]

## 2018-07-13 NOTE — DISCHARGE NOTE ADULT - MEDICATION SUMMARY - MEDICATIONS TO STOP TAKING
I will STOP taking the medications listed below when I get home from the hospital:    Eliquis 5 mg oral tablet  -- 1 tab(s) by mouth 2 times a day   -- Check with your doctor before becoming pregnant.  It is very important that you take or use this exactly as directed.  Do not skip doses or discontinue unless directed by your doctor.  Obtain medical advice before taking any non-prescription drugs as some may affect the action of this medication. I will STOP taking the medications listed below when I get home from the hospital:  None

## 2018-07-13 NOTE — DISCHARGE NOTE ADULT - CARE PROVIDERS DIRECT ADDRESSES
,DirectAddress_Unknown,DirectAddress_Unknown ,DirectAddress_Unknown,DirectAddress_Unknown,[email or web address]

## 2018-07-13 NOTE — PROGRESS NOTE ADULT - ASSESSMENT
68 y/o F PMHX idiopathic ventricular tachycardia, breast cancer 9/2016 s/p surgery and radiation, chronic fatigue syndrome, degenerative disc disease, genital herpes, hiatal hernia presents with palpitations x2 days, admitted for new onset atrial fibrillation.    - Telemetry remains in sinus rhythm with no events overnight  - cont diltiazem  daily   - No clear evidence of acute ischemia, trops negative x 2   - Cont Eliquis 5mg BID as pt has CHADS VASC = 2 (66yo, Female)  - Echo reveals normal LV systolic function LVEF 65%, no pericardial effusion, trace MR  - No evidence of volume overload  - No acute changes on EKG compared to previous  - Follows with Dr. Brown,  - BP soft yesterday not on BP meds at home, monitor routine hemodynamics reports dizziness and lightheadedness this am when walking.  Orthostatics negative and no tele events, SR.  BP stable per flow sheet.     - monitor and replete lytes, keep K>4, Mg>2  - Other cardiovascular workup will depend on clinical course.  - All other workup per primary team  - Will follow   - Discharge planning as per primary team.      Christy Arteaga NP-C  Cardiology 68 y/o F PMHX idiopathic ventricular tachycardia, breast cancer 9/2016 s/p surgery and radiation, chronic fatigue syndrome, degenerative disc disease, genital herpes, hiatal hernia presents with palpitations x2 days, admitted for new onset atrial fibrillation.    - Telemetry remains in sinus rhythm with no events overnight  - cont diltiazem  daily   - No clear evidence of acute ischemia, trops negative x 2    - Cont Eliquis 5mg BID as pt has CHADS VASC = 2 (68yo, Female)  - Echo reveals normal LV systolic function LVEF 65%, no pericardial effusion, trace MR  - No evidence of volume overload  - No acute changes on EKG compared to previous  - Follows with Dr. Brown,  - BP soft yesterday not on BP meds at home, monitor routine hemodynamics reports dizziness and lightheadedness this am when walking.  Orthostatics negative and no tele events, SR.  BP stable per flow sheet.     - monitor and replete lytes, keep K>4, Mg>2  - Other cardiovascular workup will depend on clinical course.   - All other workup per primary team  - Will follow   - Discharge planning as per primary team.      Christy Arteaga NP-C  Cardiology

## 2018-07-18 PROBLEM — C50.919 MALIGNANT NEOPLASM OF UNSPECIFIED SITE OF UNSPECIFIED FEMALE BREAST: Chronic | Status: ACTIVE | Noted: 2017-08-17

## 2018-07-18 PROBLEM — A60.00 HERPESVIRAL INFECTION OF UROGENITAL SYSTEM, UNSPECIFIED: Chronic | Status: ACTIVE | Noted: 2017-08-17

## 2018-07-24 ENCOUNTER — NON-APPOINTMENT (OUTPATIENT)
Age: 68
End: 2018-07-24

## 2018-07-24 ENCOUNTER — APPOINTMENT (OUTPATIENT)
Dept: CARDIOLOGY | Facility: CLINIC | Age: 68
End: 2018-07-24
Payer: MEDICARE

## 2018-07-24 VITALS
WEIGHT: 144 LBS | HEART RATE: 85 BPM | SYSTOLIC BLOOD PRESSURE: 114 MMHG | HEIGHT: 68 IN | DIASTOLIC BLOOD PRESSURE: 65 MMHG | BODY MASS INDEX: 21.82 KG/M2 | OXYGEN SATURATION: 98 %

## 2018-07-24 DIAGNOSIS — Z85.3 PERSONAL HISTORY OF MALIGNANT NEOPLASM OF BREAST: ICD-10-CM

## 2018-07-24 PROCEDURE — 93000 ELECTROCARDIOGRAM COMPLETE: CPT

## 2018-07-24 PROCEDURE — 99215 OFFICE O/P EST HI 40 MIN: CPT

## 2018-07-31 ENCOUNTER — APPOINTMENT (OUTPATIENT)
Dept: CARDIOLOGY | Facility: CLINIC | Age: 68
End: 2018-07-31
Payer: MEDICARE

## 2018-07-31 PROCEDURE — 93224 XTRNL ECG REC UP TO 48 HRS: CPT

## 2018-08-08 ENCOUNTER — APPOINTMENT (OUTPATIENT)
Dept: CARDIOLOGY | Facility: CLINIC | Age: 68
End: 2018-08-08
Payer: MEDICARE

## 2018-08-08 ENCOUNTER — NON-APPOINTMENT (OUTPATIENT)
Age: 68
End: 2018-08-08

## 2018-08-08 VITALS
OXYGEN SATURATION: 97 % | HEART RATE: 78 BPM | SYSTOLIC BLOOD PRESSURE: 101 MMHG | DIASTOLIC BLOOD PRESSURE: 69 MMHG | HEIGHT: 68 IN | WEIGHT: 144 LBS | BODY MASS INDEX: 21.82 KG/M2

## 2018-08-08 PROCEDURE — 99214 OFFICE O/P EST MOD 30 MIN: CPT

## 2018-08-08 PROCEDURE — 93000 ELECTROCARDIOGRAM COMPLETE: CPT

## 2018-08-31 ENCOUNTER — MEDICATION RENEWAL (OUTPATIENT)
Age: 68
End: 2018-08-31

## 2018-09-05 ENCOUNTER — MEDICATION RENEWAL (OUTPATIENT)
Age: 68
End: 2018-09-05

## 2018-09-26 ENCOUNTER — NON-APPOINTMENT (OUTPATIENT)
Age: 68
End: 2018-09-26

## 2018-09-26 ENCOUNTER — APPOINTMENT (OUTPATIENT)
Dept: CARDIOLOGY | Facility: CLINIC | Age: 68
End: 2018-09-26
Payer: MEDICARE

## 2018-09-26 VITALS
WEIGHT: 144 LBS | SYSTOLIC BLOOD PRESSURE: 136 MMHG | DIASTOLIC BLOOD PRESSURE: 83 MMHG | OXYGEN SATURATION: 97 % | HEART RATE: 103 BPM | BODY MASS INDEX: 21.82 KG/M2 | HEIGHT: 68 IN

## 2018-09-26 PROCEDURE — 99214 OFFICE O/P EST MOD 30 MIN: CPT

## 2018-09-26 PROCEDURE — 93000 ELECTROCARDIOGRAM COMPLETE: CPT

## 2018-09-27 LAB
ALBUMIN SERPL ELPH-MCNC: 4.5 G/DL
ALP BLD-CCNC: 88 U/L
ALT SERPL-CCNC: 19 U/L
ANION GAP SERPL CALC-SCNC: 11 MMOL/L
APPEARANCE: CLEAR
AST SERPL-CCNC: 24 U/L
BACTERIA: NEGATIVE
BASOPHILS # BLD AUTO: 0.02 K/UL
BASOPHILS NFR BLD AUTO: 0.4 %
BILIRUB SERPL-MCNC: 0.3 MG/DL
BILIRUBIN URINE: NEGATIVE
BLOOD URINE: NEGATIVE
BUN SERPL-MCNC: 11 MG/DL
CALCIUM SERPL-MCNC: 10 MG/DL
CHLORIDE SERPL-SCNC: 101 MMOL/L
CO2 SERPL-SCNC: 31 MMOL/L
COLOR: YELLOW
CREAT SERPL-MCNC: 0.74 MG/DL
EOSINOPHIL # BLD AUTO: 0.06 K/UL
EOSINOPHIL NFR BLD AUTO: 1.2 %
GLUCOSE QUALITATIVE U: NEGATIVE MG/DL
GLUCOSE SERPL-MCNC: 84 MG/DL
HBA1C MFR BLD HPLC: 5.8 %
HCT VFR BLD CALC: 44.8 %
HGB BLD-MCNC: 13.9 G/DL
IMM GRANULOCYTES NFR BLD AUTO: 0.2 %
KETONES URINE: NEGATIVE
LEUKOCYTE ESTERASE URINE: NEGATIVE
LYMPHOCYTES # BLD AUTO: 1.07 K/UL
LYMPHOCYTES NFR BLD AUTO: 21.4 %
MAN DIFF?: NORMAL
MCHC RBC-ENTMCNC: 28.8 PG
MCHC RBC-ENTMCNC: 31 GM/DL
MCV RBC AUTO: 92.8 FL
MICROSCOPIC-UA: NORMAL
MONOCYTES # BLD AUTO: 0.4 K/UL
MONOCYTES NFR BLD AUTO: 8 %
NEUTROPHILS # BLD AUTO: 3.44 K/UL
NEUTROPHILS NFR BLD AUTO: 68.8 %
NITRITE URINE: NEGATIVE
PH URINE: 7
PLATELET # BLD AUTO: 224 K/UL
POTASSIUM SERPL-SCNC: 5.1 MMOL/L
PROT SERPL-MCNC: 7.4 G/DL
PROTEIN URINE: NEGATIVE MG/DL
RBC # BLD: 4.83 M/UL
RBC # FLD: 14.6 %
RED BLOOD CELLS URINE: 1 /HPF
SODIUM SERPL-SCNC: 143 MMOL/L
SPECIFIC GRAVITY URINE: 1
SQUAMOUS EPITHELIAL CELLS: 1 /HPF
TSH SERPL-ACNC: 1.63 UIU/ML
UROBILINOGEN URINE: NEGATIVE MG/DL
WBC # FLD AUTO: 5 K/UL
WHITE BLOOD CELLS URINE: 0 /HPF

## 2018-10-02 ENCOUNTER — NON-APPOINTMENT (OUTPATIENT)
Age: 68
End: 2018-10-02

## 2018-10-02 ENCOUNTER — APPOINTMENT (OUTPATIENT)
Dept: ELECTROPHYSIOLOGY | Facility: CLINIC | Age: 68
End: 2018-10-02
Payer: MEDICARE

## 2018-10-02 VITALS — SYSTOLIC BLOOD PRESSURE: 119 MMHG | DIASTOLIC BLOOD PRESSURE: 81 MMHG | HEART RATE: 80 BPM | OXYGEN SATURATION: 98 %

## 2018-10-02 PROCEDURE — 99205 OFFICE O/P NEW HI 60 MIN: CPT

## 2018-10-02 PROCEDURE — 93000 ELECTROCARDIOGRAM COMPLETE: CPT

## 2018-10-02 RX ORDER — DILTIAZEM HYDROCHLORIDE 120 MG/1
120 CAPSULE, EXTENDED RELEASE ORAL
Qty: 90 | Refills: 3 | Status: DISCONTINUED | COMMUNITY
Start: 2018-07-24 | End: 2018-10-02

## 2018-10-17 ENCOUNTER — EMERGENCY (EMERGENCY)
Facility: HOSPITAL | Age: 68
LOS: 1 days | End: 2018-10-17
Attending: EMERGENCY MEDICINE
Payer: MEDICARE

## 2018-10-17 VITALS
OXYGEN SATURATION: 98 % | SYSTOLIC BLOOD PRESSURE: 132 MMHG | DIASTOLIC BLOOD PRESSURE: 83 MMHG | RESPIRATION RATE: 18 BRPM | WEIGHT: 139.99 LBS | HEIGHT: 68 IN | TEMPERATURE: 98 F | HEART RATE: 106 BPM

## 2018-10-17 DIAGNOSIS — Z98.890 OTHER SPECIFIED POSTPROCEDURAL STATES: Chronic | ICD-10-CM

## 2018-10-17 DIAGNOSIS — Z90.721 ACQUIRED ABSENCE OF OVARIES, UNILATERAL: Chronic | ICD-10-CM

## 2018-10-17 LAB
ALBUMIN SERPL ELPH-MCNC: 4.1 G/DL — SIGNIFICANT CHANGE UP (ref 3.3–5)
ALP SERPL-CCNC: 84 U/L — SIGNIFICANT CHANGE UP (ref 40–120)
ALT FLD-CCNC: 17 U/L — SIGNIFICANT CHANGE UP (ref 10–45)
ANION GAP SERPL CALC-SCNC: 9 MMOL/L — SIGNIFICANT CHANGE UP (ref 5–17)
APPEARANCE UR: CLEAR — SIGNIFICANT CHANGE UP
APTT BLD: 32.2 SEC — SIGNIFICANT CHANGE UP (ref 27.5–37.4)
AST SERPL-CCNC: 22 U/L — SIGNIFICANT CHANGE UP (ref 10–40)
BACTERIA # UR AUTO: NEGATIVE — SIGNIFICANT CHANGE UP
BASOPHILS # BLD AUTO: 0 K/UL — SIGNIFICANT CHANGE UP (ref 0–0.2)
BASOPHILS NFR BLD AUTO: 0.4 % — SIGNIFICANT CHANGE UP (ref 0–2)
BILIRUB SERPL-MCNC: 0.2 MG/DL — SIGNIFICANT CHANGE UP (ref 0.2–1.2)
BILIRUB UR-MCNC: NEGATIVE — SIGNIFICANT CHANGE UP
BUN SERPL-MCNC: 10 MG/DL — SIGNIFICANT CHANGE UP (ref 7–23)
CALCIUM SERPL-MCNC: 9.2 MG/DL — SIGNIFICANT CHANGE UP (ref 8.4–10.5)
CHLORIDE SERPL-SCNC: 104 MMOL/L — SIGNIFICANT CHANGE UP (ref 96–108)
CO2 SERPL-SCNC: 28 MMOL/L — SIGNIFICANT CHANGE UP (ref 22–31)
COLOR SPEC: COLORLESS — SIGNIFICANT CHANGE UP
CREAT SERPL-MCNC: 0.76 MG/DL — SIGNIFICANT CHANGE UP (ref 0.5–1.3)
D DIMER BLD IA.RAPID-MCNC: <150 NG/ML DDU — SIGNIFICANT CHANGE UP
DIFF PNL FLD: NEGATIVE — SIGNIFICANT CHANGE UP
EOSINOPHIL # BLD AUTO: 0 K/UL — SIGNIFICANT CHANGE UP (ref 0–0.5)
EOSINOPHIL NFR BLD AUTO: 0.6 % — SIGNIFICANT CHANGE UP (ref 0–6)
EPI CELLS # UR: 1 /HPF — SIGNIFICANT CHANGE UP
GAS PNL BLDV: SIGNIFICANT CHANGE UP
GLUCOSE SERPL-MCNC: 119 MG/DL — HIGH (ref 70–99)
GLUCOSE UR QL: ABNORMAL
HCT VFR BLD CALC: 40.3 % — SIGNIFICANT CHANGE UP (ref 34.5–45)
HGB BLD-MCNC: 13.3 G/DL — SIGNIFICANT CHANGE UP (ref 11.5–15.5)
HYALINE CASTS # UR AUTO: 0 /LPF — SIGNIFICANT CHANGE UP (ref 0–2)
INR BLD: 1.18 RATIO — HIGH (ref 0.88–1.16)
KETONES UR-MCNC: NEGATIVE — SIGNIFICANT CHANGE UP
LEUKOCYTE ESTERASE UR-ACNC: NEGATIVE — SIGNIFICANT CHANGE UP
LYMPHOCYTES # BLD AUTO: 0.9 K/UL — LOW (ref 1–3.3)
LYMPHOCYTES # BLD AUTO: 14.9 % — SIGNIFICANT CHANGE UP (ref 13–44)
MCHC RBC-ENTMCNC: 29 PG — SIGNIFICANT CHANGE UP (ref 27–34)
MCHC RBC-ENTMCNC: 33.1 GM/DL — SIGNIFICANT CHANGE UP (ref 32–36)
MCV RBC AUTO: 87.7 FL — SIGNIFICANT CHANGE UP (ref 80–100)
MONOCYTES # BLD AUTO: 0.5 K/UL — SIGNIFICANT CHANGE UP (ref 0–0.9)
MONOCYTES NFR BLD AUTO: 7.6 % — SIGNIFICANT CHANGE UP (ref 2–14)
NEUTROPHILS # BLD AUTO: 4.8 K/UL — SIGNIFICANT CHANGE UP (ref 1.8–7.4)
NEUTROPHILS NFR BLD AUTO: 76.5 % — SIGNIFICANT CHANGE UP (ref 43–77)
NITRITE UR-MCNC: NEGATIVE — SIGNIFICANT CHANGE UP
PH UR: 7.5 — SIGNIFICANT CHANGE UP (ref 5–8)
PLATELET # BLD AUTO: 180 K/UL — SIGNIFICANT CHANGE UP (ref 150–400)
POTASSIUM SERPL-MCNC: 3.9 MMOL/L — SIGNIFICANT CHANGE UP (ref 3.5–5.3)
POTASSIUM SERPL-SCNC: 3.9 MMOL/L — SIGNIFICANT CHANGE UP (ref 3.5–5.3)
PROT SERPL-MCNC: 6.7 G/DL — SIGNIFICANT CHANGE UP (ref 6–8.3)
PROT UR-MCNC: NEGATIVE — SIGNIFICANT CHANGE UP
PROTHROM AB SERPL-ACNC: 12.8 SEC — HIGH (ref 9.8–12.7)
RBC # BLD: 4.59 M/UL — SIGNIFICANT CHANGE UP (ref 3.8–5.2)
RBC # FLD: 12.9 % — SIGNIFICANT CHANGE UP (ref 10.3–14.5)
RBC CASTS # UR COMP ASSIST: 1 /HPF — SIGNIFICANT CHANGE UP (ref 0–4)
SODIUM SERPL-SCNC: 141 MMOL/L — SIGNIFICANT CHANGE UP (ref 135–145)
SP GR SPEC: 1.01 — SIGNIFICANT CHANGE UP (ref 1.01–1.02)
TROPONIN T, HIGH SENSITIVITY RESULT: <6 NG/L — SIGNIFICANT CHANGE UP (ref 0–51)
UROBILINOGEN FLD QL: NEGATIVE — SIGNIFICANT CHANGE UP
WBC # BLD: 6.3 K/UL — SIGNIFICANT CHANGE UP (ref 3.8–10.5)
WBC # FLD AUTO: 6.3 K/UL — SIGNIFICANT CHANGE UP (ref 3.8–10.5)
WBC UR QL: 1 /HPF — SIGNIFICANT CHANGE UP (ref 0–5)

## 2018-10-17 PROCEDURE — 93010 ELECTROCARDIOGRAM REPORT: CPT

## 2018-10-17 PROCEDURE — 99223 1ST HOSP IP/OBS HIGH 75: CPT

## 2018-10-17 PROCEDURE — 71046 X-RAY EXAM CHEST 2 VIEWS: CPT | Mod: 26

## 2018-10-17 PROCEDURE — 99220: CPT

## 2018-10-17 RX ORDER — APIXABAN 2.5 MG/1
5 TABLET, FILM COATED ORAL EVERY 12 HOURS
Qty: 0 | Refills: 0 | Status: DISCONTINUED | OUTPATIENT
Start: 2018-10-17 | End: 2018-10-21

## 2018-10-17 RX ORDER — ACYCLOVIR SODIUM 500 MG
400 VIAL (EA) INTRAVENOUS AT BEDTIME
Qty: 0 | Refills: 0 | Status: DISCONTINUED | OUTPATIENT
Start: 2018-10-17 | End: 2018-10-17

## 2018-10-17 RX ORDER — SODIUM CHLORIDE 9 MG/ML
3 INJECTION INTRAMUSCULAR; INTRAVENOUS; SUBCUTANEOUS EVERY 8 HOURS
Qty: 0 | Refills: 0 | Status: DISCONTINUED | OUTPATIENT
Start: 2018-10-17 | End: 2018-10-21

## 2018-10-17 RX ORDER — METOPROLOL TARTRATE 50 MG
25 TABLET ORAL
Qty: 0 | Refills: 0 | Status: DISCONTINUED | OUTPATIENT
Start: 2018-10-17 | End: 2018-10-21

## 2018-10-17 RX ORDER — ACYCLOVIR SODIUM 500 MG
400 VIAL (EA) INTRAVENOUS
Qty: 0 | Refills: 0 | Status: DISCONTINUED | OUTPATIENT
Start: 2018-10-17 | End: 2018-10-21

## 2018-10-17 RX ADMIN — SODIUM CHLORIDE 3 MILLILITER(S): 9 INJECTION INTRAMUSCULAR; INTRAVENOUS; SUBCUTANEOUS at 22:09

## 2018-10-17 RX ADMIN — Medication 400 MILLIGRAM(S): at 22:04

## 2018-10-17 RX ADMIN — Medication 25 MILLIGRAM(S): at 22:04

## 2018-10-17 NOTE — ED CDU PROVIDER INITIAL DAY NOTE - OBJECTIVE STATEMENT
68 y/o female PMHx idiopathic ventricular tachycardia, breast cancer 9/2016 s/p lumpectomy 10/2016 and radiation 1/2017, recently admitted 7/2018 for new onset Rapid Afib initially treated with cardizem (caused hypotension) switched to metop 25mg BID and eliquis (discontinued 1 week ago as pt had rectal bleeding) presented to the ED c/o palpitations 1 hour ago. Patient was sitting eating lunch when she experienced worsening constant palpitations lasting 20 min and felt similar episode of afib in past. notes HR on her iwatch was in the 140s. pt states palpitations are associated with lightheadedness, nausea, SOB, and blurry vision. Patient took a dose of previous Rx of eliquis. Symptoms resolved on the way to arriving to hospital. Patient scheduled for ablation on 11/28/18 with EP Dr. Wilian Murray. Patient denied CP, abdominal pain, vomiting, syncope, peripheral edema, headache, fever chills, smoking, or ETOH use.   In ED, pt in NSR, pts cardiologist was contacted and they will see her in the AM, pt sent to CDU for continued telemetry and cardio eval in AM.     PMD Dr. Goran Bennett  Cardiology Dr. Jose Maria Shipley

## 2018-10-17 NOTE — ED ADULT NURSE NOTE - OBJECTIVE STATEMENT
66 yo female presents to ED from home c/o 1 episode of palpitations, lasting approximately 20 minutes. Patient reports she was eating at work when she felt palpitations that is similar to episode of afib in the past. Patient reports lightheadedness, nausea, SOB, and blurred vision during the episode. Patient states she took 1 dose of Eliquis during episode, and then came to hospital; states symptoms resolved on way to hospital. Patient denies SOB, CP, n/v/d at this time, recent illness/travel, falls/LOC. Patient A&Ox4, breathing spontaneously, airway patent, bl clear lungs, abdomen nontender, +pulses, cap refill <2 seconds. Patient resting in bed, plan of care explained. Cardiac monitor in place.

## 2018-10-17 NOTE — ED ADULT NURSE NOTE - NSIMPLEMENTINTERV_GEN_ALL_ED
Implemented All Universal Safety Interventions:  [place] to call system. Call bell, personal items and telephone within reach. Instruct patient to call for assistance. Room bathroom lighting operational. Non-slip footwear when patient is off stretcher. Physically safe environment: no spills, clutter or unnecessary equipment. Stretcher in lowest position, wheels locked, appropriate side rails in place.

## 2018-10-17 NOTE — ED PROVIDER NOTE - ATTENDING CONTRIBUTION TO CARE
Private Physician Fermin Shabazz  68 y/o F PMHX idiopathic ventricular tachycardia, breast cancer 9/2016 s/p surgery and radiation, Hiatial hernia, Afib, Pt was last admitted July/2018 for new onset Rapid Afib, Treated with cardizem, and eliquis Dc for rectal bleeding until 1wk ago. Now comes to ed complains of episode for "Not feeling well ok all day" While at Lunch took heart rate with pulse of 140 and felt like prior afib, Took dose of eliquis pta, Now feel "little better" no chest pain. Has mild ha, no nvdc.fc,abd pain. no gi bleeding +vague mild shortness of breath and mild ha. No cough sputum, No travel leg swelling pain.  Soc . Employed as dentist. NCAT neck supple chest clear anterior & posterior abd soft +bs no mass guarding, neruo no focal defect. CV no rubs, gallops or murmurs.   Miguel Greenfield MD, Facep

## 2018-10-17 NOTE — ED CDU PROVIDER INITIAL DAY NOTE - FAMILY HISTORY
Father  Still living? Unknown  Family history of liver cancer, Age at diagnosis: Age Unknown  Family history of heart disease, Age at diagnosis: Age Unknown     Mother  Still living? Unknown  Family history of lung cancer, Age at diagnosis: Age Unknown

## 2018-10-17 NOTE — ED CDU PROVIDER DISPOSITION NOTE - PLAN OF CARE
1. Take all of your home medications as previously prescribed.   2. Follow up with your PMD and cardiologist/EP within 48-72 hours. Show copies of your reports given to you.   3. Worsening or continued palpitations, shortness of breath, weakness, return to ED. 1. Take all of your home medications as previously prescribed.      Continue Eliquis as instructed. *** Be sure to monitor for bleeding***     Continue metoprolol 25mg twice daily.     2. Follow up with your PMD upon discharge.       Follow up with your cardiologist, Dr. Shipley, at scheduled appointment this week and EP for scheduled ablation.  Show copies of your reports given to you.   3. Return to ER for new, worsening or continued palpitations, chest pain, shortness of breath, weakness, dizziness, bright red bloody stool, dark colored stool, any easy bleeding/bruising, or any other concerns.

## 2018-10-17 NOTE — ED PROVIDER NOTE - OBJECTIVE STATEMENT
66 y/o female PMHx idiopathic ventricular tachycardia, breast cancer 9/2016 s/p lumpectomy 10/2016 and radiation 1/2017, recently admitted 7/2018 for new onset Rapid Afib initially treated with cardizem (caused hypotension) switched to metop 25mg BID and eliquis (discontinued 1 week ago as pt had rectal bleeding) presented to the ED c/o palpitations 1 hour ago. Patient was sitting eating lunch when she experienced worsening constant palpitations lasting 20 min and felt similar episode of afib in past. In addition patient experienced lightheadedness, nausea, SOB, and blurry vision during the episode. Patient took a dose of previous Rx of eliquis. Symptoms resolved on the way to arriving to hospital. Patient scheduled for ablation on 11/28/18 with EP Dr. Wilian Murray. Patient denied CP, abdominal pain, vomiting, syncope, peripheral edema, headache, fever chills, smoking, or ETOH use     Cardiology: Jose Maria Shipley

## 2018-10-17 NOTE — ED CDU PROVIDER INITIAL DAY NOTE - MEDICAL DECISION MAKING DETAILS
PT with previous hx of afib pw complains of palps, planning alblation with ep. For tele montioring and cards/ep consult  Miguel Greenfield MD, Facep

## 2018-10-17 NOTE — ED PROVIDER NOTE - MEDICAL DECISION MAKING DETAILS
Hx of afib planning ablation pw palps ro acs check trop.dimer,ekg,xr consult   Miguel Greenfield MD, Facep ,

## 2018-10-17 NOTE — ED PROVIDER NOTE - PROGRESS NOTE DETAILS
.. JERSEY Gutierrez: Discussed case with cardiology Dr. Shiplye cell [phone] who agreed with EP consult and CDU for tele. Dr. Shipley's team will see pt in AM as he is currently on vacation. Consulted EP fellow

## 2018-10-17 NOTE — ED CDU PROVIDER INITIAL DAY NOTE - ATTENDING CONTRIBUTION TO CARE
I have personally performed a face to face diagnostic evaluation on this patient.  I have reviewed the ACP note and agree with the history, exam, and plan of care, except as noted.  History and Exam by me shows  Miguel Greenfield MD, Facep
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
## 2018-10-17 NOTE — ED CDU PROVIDER DISPOSITION NOTE - CLINICAL COURSE
68 y/o female PMHx idiopathic ventricular tachycardia, breast cancer 9/2016 s/p lumpectomy 10/2016 and radiation 1/2017, recently admitted 7/2018 for new onset Rapid Afib initially treated with cardizem (caused hypotension) switched to metop 25mg BID and eliquis (discontinued 1 week ago as pt had rectal bleeding) presented to the ED c/o palpitations 1 hour ago. Patient was sitting eating lunch when she experienced worsening constant palpitations lasting 20 min and felt similar episode of afib in past. notes HR on her iwatch was in the 140s. pt states palpitations are associated with lightheadedness, nausea, SOB, and blurry vision. Patient took a dose of previous Rx of eliquis. Symptoms resolved on the way to arriving to hospital. Patient scheduled for ablation on 11/28/18 with EP Dr. Wilian Murray. Patient denied CP, abdominal pain, vomiting, syncope, peripheral edema, headache, fever chills, smoking, or ETOH use.   In ED, pt in NSR, pts cardiologist was contacted and they will see her in the AM, pt sent to CDU for continued telemetry and cardio eval in AM. 66 y/o female PMHx idiopathic ventricular tachycardia, breast cancer 9/2016 s/p lumpectomy 10/2016 and radiation 1/2017, recently admitted 7/2018 for new onset Rapid Afib initially treated with cardizem (caused hypotension) switched to metop 25mg BID and eliquis (discontinued 1 week ago as pt had rectal bleeding) presented to the ED c/o palpitations 1 hour ago. Patient was sitting eating lunch when she experienced worsening constant palpitations lasting 20 min and felt similar episode of afib in past. notes HR on her iwatch was in the 140s. pt states palpitations are associated with lightheadedness, nausea, SOB, and blurry vision. Patient took a dose of previous Rx of eliquis. Symptoms resolved on the way to arriving to hospital. Patient scheduled for ablation on 11/28/18 with EP Dr. Wilian Murray. Patient denied CP, abdominal pain, vomiting, syncope, peripheral edema, headache, fever chills, smoking, or ETOH use.   In ED, pt in NSR, pts cardiologist was contacted and they will see her in the AM, pt sent to CDU for continued telemetry and cardio eval in AM.  Patient ambulatory in CDU without any complaints. VSS. NSR on telemetry. Patient denies any new episodes of BRBPR, melena, easy bleeding/bruising, CP, palpitations, SOB. Patient seen by cardiologist, Dr. Canela, this AM recommending patient be d/c and continue Eliquis, monitor for bleeding, and continue metoprolol 25 BID.  Pt to f/u with Dr. Shipley as outpatient and f/u with scheduled appointment for ablation with Dr. Murray.  Called EP fellow Vanesa and confirmed plan. Patient given strict return precautions.

## 2018-10-18 VITALS
SYSTOLIC BLOOD PRESSURE: 103 MMHG | DIASTOLIC BLOOD PRESSURE: 77 MMHG | OXYGEN SATURATION: 97 % | RESPIRATION RATE: 18 BRPM | HEART RATE: 84 BPM | TEMPERATURE: 99 F

## 2018-10-18 PROCEDURE — 82803 BLOOD GASES ANY COMBINATION: CPT

## 2018-10-18 PROCEDURE — 99217: CPT

## 2018-10-18 PROCEDURE — 82435 ASSAY OF BLOOD CHLORIDE: CPT

## 2018-10-18 PROCEDURE — 84132 ASSAY OF SERUM POTASSIUM: CPT

## 2018-10-18 PROCEDURE — 82330 ASSAY OF CALCIUM: CPT

## 2018-10-18 PROCEDURE — 85730 THROMBOPLASTIN TIME PARTIAL: CPT

## 2018-10-18 PROCEDURE — 81001 URINALYSIS AUTO W/SCOPE: CPT

## 2018-10-18 PROCEDURE — 85014 HEMATOCRIT: CPT

## 2018-10-18 PROCEDURE — G0378: CPT

## 2018-10-18 PROCEDURE — 99285 EMERGENCY DEPT VISIT HI MDM: CPT

## 2018-10-18 PROCEDURE — 71046 X-RAY EXAM CHEST 2 VIEWS: CPT

## 2018-10-18 PROCEDURE — 84484 ASSAY OF TROPONIN QUANT: CPT

## 2018-10-18 PROCEDURE — 80053 COMPREHEN METABOLIC PANEL: CPT

## 2018-10-18 PROCEDURE — 85379 FIBRIN DEGRADATION QUANT: CPT

## 2018-10-18 PROCEDURE — 93005 ELECTROCARDIOGRAM TRACING: CPT

## 2018-10-18 PROCEDURE — 84295 ASSAY OF SERUM SODIUM: CPT

## 2018-10-18 PROCEDURE — 85027 COMPLETE CBC AUTOMATED: CPT

## 2018-10-18 PROCEDURE — 85610 PROTHROMBIN TIME: CPT

## 2018-10-18 PROCEDURE — 82947 ASSAY GLUCOSE BLOOD QUANT: CPT

## 2018-10-18 PROCEDURE — 99284 EMERGENCY DEPT VISIT MOD MDM: CPT | Mod: 25

## 2018-10-18 PROCEDURE — 83605 ASSAY OF LACTIC ACID: CPT

## 2018-10-18 RX ADMIN — Medication 25 MILLIGRAM(S): at 09:55

## 2018-10-18 RX ADMIN — SODIUM CHLORIDE 3 MILLILITER(S): 9 INJECTION INTRAMUSCULAR; INTRAVENOUS; SUBCUTANEOUS at 05:41

## 2018-10-18 RX ADMIN — APIXABAN 5 MILLIGRAM(S): 2.5 TABLET, FILM COATED ORAL at 04:25

## 2018-10-18 RX ADMIN — Medication 400 MILLIGRAM(S): at 09:55

## 2018-10-18 NOTE — ED CDU PROVIDER SUBSEQUENT DAY NOTE - MEDICAL DECISION MAKING DETAILS
Camilo: Patient with no additional episodes of BRBPR.  Pending EP recommendation for continuing with AC.  Patient with no additional complaints in CDU. will d/c home to f/u outpatient with EP/cards.

## 2018-10-18 NOTE — CONSULT NOTE ADULT - SUBJECTIVE AND OBJECTIVE BOX
Patient seen and evaluated at bedside    Chief Complaint:    HPI:      PMHx:   GERD (gastroesophageal reflux disease)  Afib  Genital herpes  Breast cancer  Chronic Fatigue Syndrome  Hiatal Hernia  IBS (Irritable Bowel Syndrome)  DDD (Degenerative Disc Disease), Cervical  Idiopathic Ventricular Tachycardia      PSHx:   H/O oophorectomy  H/O lumpectomy  S/P Tonsillectomy  S/P D&C  Status Post Exploratory Laparotomy      Allergies:  adhesives (Unknown)  Cipro (Hepatotoxicity)  Gluten (Stomach Upset)  sulfa drugs (Rash)  tetracycline (Rash)  Valium (Short breath; Faint; Other)      Home Meds:    Current Medications:   acyclovir   Oral Tab/Cap 400 milliGRAM(s) Oral two times a day  apixaban 5 milliGRAM(s) Oral every 12 hours  metoprolol tartrate 25 milliGRAM(s) Oral two times a day  sodium chloride 0.9% lock flush 3 milliLiter(s) IV Push every 8 hours      FAMILY HISTORY:  Family history of heart disease (Father)  Family history of liver cancer (Father)  Family history of lung cancer (Mother)      Social History:  Smoking History:  Alcohol Use:  Drug Use:    REVIEW OF SYSTEMS:  CONSTITUTIONAL: No weakness, fevers or chills  EYES/ENT: No visual changes;  No dysphagia  NECK: No pain or stiffness  RESPIRATORY: No cough, wheezing, hemoptysis; No shortness of breath  CARDIOVASCULAR: No chest pain or palpitations; No lower extremity edema  GASTROINTESTINAL: No abdominal or epigastric pain. No nausea, vomiting, or hematemesis; No diarrhea or constipation. No melena or hematochezia.  BACK: No back pain  GENITOURINARY: No dysuria, frequency or hematuria  NEUROLOGICAL: No numbness or weakness  SKIN: No itching, burning, rashes, or lesions   All other review of systems is negative unless indicated above.    Physical Exam:  T(F): 98 (10-18), Max: 98.3 (10-17)  HR: 65 (10-18) (65 - 106)  BP: 107/68 (10-18) (107/68 - 132/83)  RR: 16 (10-18)  SpO2: 96% (10-18)  GENERAL: No acute distress, well-developed  HEAD:  Atraumatic, Normocephalic  ENT: EOMI, PERRLA, conjunctiva and sclera clear, Neck supple, No JVD, moist mucosa  CHEST/LUNG: Clear to auscultation bilaterally; No wheeze, equal breath sounds bilaterally   BACK: No spinal tenderness  HEART: Regular rate and rhythm; No murmurs, rubs, or gallops  ABDOMEN: Soft, Nontender, Nondistended; Bowel sounds present  EXTREMITIES:  No clubbing, cyanosis, or edema  PSYCH: Nl behavior, nl affect  NEUROLOGY: AAOx3, non-focal, cranial nerves intact  SKIN: Normal color, No rashes or lesions  LINES:    Cardiovascular Diagnostic Testing:    ECG: Personally reviewed:    Echo: Personally reviewed:  7/12/18  Technically difficult study  Mitral Valve: Normal mitral annulus and valve. Trace mitral regurgitation.  Aortic Valve/Aorta: The aortic valve is not well visualized but probably normal  Tricuspid Valve: Normal tricuspid valve. Trace tricuspid regurgitation.  Pulmonic Valve: The pulmonic valve is not well visualized. Probably normal.  Left Atrium: Normal  Right Atrium: Normal  Left Ventricle: Endocardium is not well-visualized. Overall preserved left ventricular systolic function. The EF is approximately 65%.  Right Ventricle: Normal right ventricular size and function.  Pericardium/Pleura: No pericardial effusion noted.  Pulmonary/RV Pressure: The right ventricular systolic pressure is estimated to be 25mmHg, assuming that the right atrial pressure is estimated to be 8 mmHg. This is consistent with normal pulmonary pressures.  LV Diastolic Function: Normal diastolic function.    Conclusion: Technically difficult study. Overall preserved left  ventricular systolic function.      Imaging:    CXR: Personally reviewed    Labs: Personally reviewed                        13.3   6.3   )-----------( 180      ( 17 Oct 2018 18:35 )             40.3     10-17    141  |  104  |  10  ----------------------------<  119<H>  3.9   |  28  |  0.76    Ca    9.2      17 Oct 2018 18:35    TPro  6.7  /  Alb  4.1  /  TBili  0.2  /  DBili  x   /  AST  22  /  ALT  17  /  AlkPhos  84  10-17    PT/INR - ( 17 Oct 2018 18:35 )   PT: 12.8 sec;   INR: 1.18 ratio    PTT - ( 17 Oct 2018 18:35 )  PTT:32.2 sec    CARDIAC MARKERS ( 17 Oct 2018 18:35 )  <6 ng/L / x     / x     / x     / x     / x Patient seen and evaluated at bedside    Chief Complaint: Palpitations    HPI:  Ms Dalal is a 68 yo F with a PMH significant for idiopathic ventricular tachycardia, paroxysmal atrial fibrillation, chronic fatigue and prior left breast CA s/p surgery who presents after an episode of palpitations. She notes that she is very aware of her heart rate and rhythm and today was feeling lousy. She was at work, sitting at lunch, and noticed her heart was beating very fast. Her watch was reading her rates in the 140s. She notes associated SOB, nausea and blurry vision. The episode lasted about 30 minutes and it was very similar to her prior episode of atrial fibrillation in July. In July, she was admitted for a similar presentation and found to be in AF. She was discharged on diltiazem and Eliquis however noted GI side effects so her dilt was changed to metoprolol. She followed up with Dr Murray in the office and was planned for a possible atrial arrhythmia ablation.     Of note, patient states she stopped her Eliquis about a week ago due to rectal bleeding. She took one Eliquis dose today when she felt her palpitations. She notes compliance with her metoprolol.    In the ED, vitals were wnl. EKG NSR at 93. CBC and CMP normal. Patient asymptomatic.    PMHx:   GERD (gastroesophageal reflux disease)  Afib  Genital herpes  Breast cancer  Chronic Fatigue Syndrome  Hiatal Hernia  IBS (Irritable Bowel Syndrome)  DDD (Degenerative Disc Disease), Cervical  Idiopathic Ventricular Tachycardia      PSHx:   H/O oophorectomy  H/O lumpectomy  S/P Tonsillectomy  S/P D&C  Status Post Exploratory Laparotomy      Allergies:  adhesives (Unknown)  Cipro (Hepatotoxicity)  Gluten (Stomach Upset)  sulfa drugs (Rash)  tetracycline (Rash)  Valium (Short breath; Faint; Other)      Home Meds:    Current Medications:   acyclovir   Oral Tab/Cap 400 milliGRAM(s) Oral two times a day  apixaban 5 milliGRAM(s) Oral every 12 hours  metoprolol tartrate 25 milliGRAM(s) Oral two times a day  sodium chloride 0.9% lock flush 3 milliLiter(s) IV Push every 8 hours      FAMILY HISTORY:  Family history of heart disease (Father)  Family history of liver cancer (Father)  Family history of lung cancer (Mother)      Social History:  Smoking History:  Alcohol Use:  Drug Use:    REVIEW OF SYSTEMS:  CONSTITUTIONAL: No weakness, fevers or chills  EYES/ENT: No visual changes;  No dysphagia  NECK: No pain or stiffness  RESPIRATORY: No cough, wheezing, hemoptysis; No shortness of breath  CARDIOVASCULAR: No chest pain or palpitations; No lower extremity edema  GASTROINTESTINAL: No abdominal or epigastric pain. No nausea, vomiting, or hematemesis; No diarrhea or constipation. No melena or hematochezia.  BACK: No back pain  GENITOURINARY: No dysuria, frequency or hematuria  NEUROLOGICAL: No numbness or weakness  SKIN: No itching, burning, rashes, or lesions   All other review of systems is negative unless indicated above.    Physical Exam:  T(F): 98 (10-18), Max: 98.3 (10-17)  HR: 65 (10-18) (65 - 106)  BP: 107/68 (10-18) (107/68 - 132/83)  RR: 16 (10-18)  SpO2: 96% (10-18)  GENERAL: No acute distress, well-developed  HEAD:  Atraumatic, Normocephalic  ENT: EOMI, PERRLA, conjunctiva and sclera clear, Neck supple, No JVD, moist mucosa  CHEST/LUNG: Clear to auscultation bilaterally; No wheeze, equal breath sounds bilaterally   BACK: No spinal tenderness  HEART: Regular rate and rhythm; No murmurs, rubs, or gallops  ABDOMEN: Soft, Nontender, Nondistended; Bowel sounds present  EXTREMITIES:  No clubbing, cyanosis, or edema  PSYCH: Nl behavior, nl affect  NEUROLOGY: AAOx3, non-focal, cranial nerves intact  SKIN: Normal color, No rashes or lesions  LINES:    Cardiovascular Diagnostic Testing:    ECG: Personally reviewed:    Echo: Personally reviewed:  7/12/18  Technically difficult study  Mitral Valve: Normal mitral annulus and valve. Trace mitral regurgitation.  Aortic Valve/Aorta: The aortic valve is not well visualized but probably normal  Tricuspid Valve: Normal tricuspid valve. Trace tricuspid regurgitation.  Pulmonic Valve: The pulmonic valve is not well visualized. Probably normal.  Left Atrium: Normal  Right Atrium: Normal  Left Ventricle: Endocardium is not well-visualized. Overall preserved left ventricular systolic function. The EF is approximately 65%.  Right Ventricle: Normal right ventricular size and function.  Pericardium/Pleura: No pericardial effusion noted.  Pulmonary/RV Pressure: The right ventricular systolic pressure is estimated to be 25mmHg, assuming that the right atrial pressure is estimated to be 8 mmHg. This is consistent with normal pulmonary pressures.  LV Diastolic Function: Normal diastolic function.    Conclusion: Technically difficult study. Overall preserved left  ventricular systolic function.      Imaging:    CXR: Personally reviewed    Labs: Personally reviewed                        13.3   6.3   )-----------( 180      ( 17 Oct 2018 18:35 )             40.3     10-17    141  |  104  |  10  ----------------------------<  119<H>  3.9   |  28  |  0.76    Ca    9.2      17 Oct 2018 18:35    TPro  6.7  /  Alb  4.1  /  TBili  0.2  /  DBili  x   /  AST  22  /  ALT  17  /  AlkPhos  84  10-17    PT/INR - ( 17 Oct 2018 18:35 )   PT: 12.8 sec;   INR: 1.18 ratio    PTT - ( 17 Oct 2018 18:35 )  PTT:32.2 sec    CARDIAC MARKERS ( 17 Oct 2018 18:35 )  <6 ng/L / x     / x     / x     / x     / x Patient seen and evaluated at bedside    Chief Complaint: Palpitations    HPI:  Ms Dalal is a 68 yo F with a PMH significant for idiopathic ventricular tachycardia, paroxysmal atrial fibrillation, chronic fatigue and prior left breast CA s/p surgery who presents after an episode of palpitations. She notes that she is very aware of her heart rate and rhythm and today was feeling lousy. She was at work, sitting at lunch, and noticed her heart was beating very fast. Her watch was reading her rates in the 140s. She notes associated SOB, nausea and blurry vision. The episode lasted about 30 minutes and it was very similar to her prior episode of atrial fibrillation in July. In July, she was admitted for a similar presentation and found to be in AF. She was discharged on diltiazem and Eliquis however noted GI side effects so her dilt was changed to metoprolol. She followed up with Dr Murray in the office and was planned for a possible atrial arrhythmia ablation.     Of note, patient states she stopped her Eliquis about a week ago due to rectal bleeding. She took one Eliquis dose today when she felt her palpitations. She notes compliance with her metoprolol.    In the ED, vitals were wnl. EKG NSR at 93. CBC and CMP normal. Patient asymptomatic.    PMHx:   GERD (gastroesophageal reflux disease)  Afib  Genital herpes  Breast cancer  Chronic Fatigue Syndrome  Hiatal Hernia  IBS (Irritable Bowel Syndrome)  DDD (Degenerative Disc Disease), Cervical  Idiopathic Ventricular Tachycardia      PSHx:   H/O oophorectomy  H/O lumpectomy  S/P Tonsillectomy  S/P D&C  Status Post Exploratory Laparotomy      Allergies:  adhesives (Unknown)  Cipro (Hepatotoxicity)  Gluten (Stomach Upset)  sulfa drugs (Rash)  tetracycline (Rash)  Valium (Short breath; Faint; Other)      Home Meds:  acyclovir 40mg  metoprolol succ 25 BID  cyclobenzaprine PRN  tylenol PM  Eliquis 5 BID    Current Medications:   acyclovir   Oral Tab/Cap 400 milliGRAM(s) Oral two times a day  apixaban 5 milliGRAM(s) Oral every 12 hours  metoprolol tartrate 25 milliGRAM(s) Oral two times a day  sodium chloride 0.9% lock flush 3 milliLiter(s) IV Push every 8 hours      FAMILY HISTORY:  Family history of heart disease (Father)  Family history of liver cancer (Father)  Family history of lung cancer (Mother)      Social History:  Smoking History: denies  Alcohol Use: denies  Drug Use: denies    REVIEW OF SYSTEMS:  CONSTITUTIONAL: No weakness, fevers or chills  EYES/ENT: + blurry vision, No dysphagia  NECK: No pain or stiffness  RESPIRATORY: + dyspnea, No cough, wheezing, hemoptysis  CARDIOVASCULAR: +palpitations, No chest pain; No lower extremity edema  GASTROINTESTINAL: No abdominal or epigastric pain. No nausea, vomiting, or hematemesis; No diarrhea or constipation. No melena or hematochezia.  BACK: No back pain  GENITOURINARY: No dysuria, frequency or hematuria  NEUROLOGICAL: No numbness or weakness  SKIN: No itching, burning, rashes, or lesions   All other review of systems is negative unless indicated above.    Physical Exam:  T(F): 98 (10-18), Max: 98.3 (10-17)  HR: 65 (10-18) (65 - 106)  BP: 107/68 (10-18) (107/68 - 132/83)  RR: 16 (10-18)  SpO2: 96% (10-18)  GENERAL: No acute distress, well-developed  HEAD:  Atraumatic, Normocephalic  ENT: EOMI, PERRLA, conjunctiva and sclera clear, Neck supple, No JVD, moist mucosa  CHEST/LUNG: Clear to auscultation bilaterally; No wheeze, equal breath sounds bilaterally   BACK: No spinal tenderness  HEART: Regular rate and rhythm; No murmurs, rubs, or gallops  ABDOMEN: Soft, Nontender, Nondistended; Bowel sounds present  EXTREMITIES:  No clubbing, cyanosis, or edema  PSYCH: Nl behavior, nl affect  NEUROLOGY: AAOx3, non-focal, cranial nerves intact  SKIN: Normal color, No rashes or lesions    Cardiovascular Diagnostic Testing:    ECG: Personally reviewed: NSR at 93    Echo: Personally reviewed:  7/12/18  Technically difficult study  Mitral Valve: Normal mitral annulus and valve. Trace mitral regurgitation.  Aortic Valve/Aorta: The aortic valve is not well visualized but probably normal  Tricuspid Valve: Normal tricuspid valve. Trace tricuspid regurgitation.  Pulmonic Valve: The pulmonic valve is not well visualized. Probably normal.  Left Atrium: Normal  Right Atrium: Normal  Left Ventricle: Endocardium is not well-visualized. Overall preserved left ventricular systolic function. The EF is approximately 65%.  Right Ventricle: Normal right ventricular size and function.  Pericardium/Pleura: No pericardial effusion noted.  Pulmonary/RV Pressure: The right ventricular systolic pressure is estimated to be 25mmHg, assuming that the right atrial pressure is estimated to be 8 mmHg. This is consistent with normal pulmonary pressures.  LV Diastolic Function: Normal diastolic function.    Conclusion: Technically difficult study. Overall preserved left  ventricular systolic function.      Imaging:    CXR: Personally reviewed    Labs: Personally reviewed                        13.3   6.3   )-----------( 180      ( 17 Oct 2018 18:35 )             40.3     10-17    141  |  104  |  10  ----------------------------<  119<H>  3.9   |  28  |  0.76    Ca    9.2      17 Oct 2018 18:35    TPro  6.7  /  Alb  4.1  /  TBili  0.2  /  DBili  x   /  AST  22  /  ALT  17  /  AlkPhos  84  10-17    PT/INR - ( 17 Oct 2018 18:35 )   PT: 12.8 sec;   INR: 1.18 ratio    PTT - ( 17 Oct 2018 18:35 )  PTT:32.2 sec    CARDIAC MARKERS ( 17 Oct 2018 18:35 )  <6 ng/L / x     / x     / x     / x     / x
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
## 2018-10-18 NOTE — ED CDU PROVIDER SUBSEQUENT DAY NOTE - ATTENDING CONTRIBUTION TO CARE
I have personally performed a face to face diagnostic evaluation on this patient.  I have reviewed the ACP note and agree with the history, exam, and plan of care, except as noted.  History and Exam by me shows 67 year old female with brbpr on eliquis for afib. PE: att exam: patient awake alert NAD . LUNGS CTAB no wheeze no crackle. CARD irregularly irregular no m/r/g.  Abdomen soft NT ND no rebound no guarding no CVA tenderness. EXT WWP no edema no calf tenderness CV 2+DP/PT bilaterally. neuro A&Ox3 gait normal.  skin warm and dry no rash I have personally performed a face to face diagnostic evaluation on this patient.  I have reviewed the ACP note and agree with the history, exam, and plan of care, except as noted.  History and Exam by me shows 67 year old female with brbpr on eliquis for afib. PE: att exam: patient awake alert NAD . LUNGS CTAB no wheeze no crackle. CARD + s1s2 no m/r/g.  Abdomen soft NT ND no rebound no guarding no CVA tenderness. EXT WWP no edema no calf tenderness CV 2+DP/PT bilaterally. neuro A&Ox3 gait normal.  skin warm and dry no rash

## 2018-10-18 NOTE — ED ADULT NURSE REASSESSMENT NOTE - NS ED NURSE REASSESS COMMENT FT1
Report given to CDU RN . Pt aware of bed assignment. Pt currently comfortable. VSS.
Pt received from Radha ONOFRE . Pt oriented to CDU & plan of care was discussed. Pt A&O x 4. Pt In CDU for tele monitoring. Pt denies any SOB, blurred vision, or palpations as of now. Pt states she has those symptoms when she gets an episode of Afib. Pt Night meds ordered awaiting from pharmacy. Explained to pt to tell RN or PA if symptoms come back. Safety & comfort measures maintained. Call bell in reach. Will continue to monitor.
07.15 Am Received report from KINGSTON Vargas pt is observed for irregular heartbeat  08.00 Pt reassessed . Pt is A&OX4 Pt resting Kaila N/V/D fever chills Cp sob  Has stable vitals pt is NSR on the monitor  . Continue to monitor  11.00 Am  Pt is Re evaluated by  Dr nima stuart pt is discharged  Ml out PA Berta Calabrese  gave the discharge summary & explained the follow up care  Pt has no irregular hearbeat has stable vitals pt verblised the understanding on follow up care stable to go home

## 2018-10-18 NOTE — ED ADULT NURSE REASSESSMENT NOTE - NSIMPLEMENTINTERV_GEN_ALL_ED
Implemented All Universal Safety Interventions:  [place] to call system. Call bell, personal items and telephone within reach. Instruct patient to call for assistance. Room bathroom lighting operational. Non-slip footwear when patient is off stretcher. Physically safe environment: no spills, clutter or unnecessary equipment. Stretcher in lowest position, wheels locked, appropriate side rails in place.

## 2018-10-18 NOTE — ED CDU PROVIDER SUBSEQUENT DAY NOTE - HISTORY
No interval changes since initial CDU provider note. Pt feels well without complaint. Denies any palpitations or other symptoms. NAD, VSS. no events on tele. cardio to see pt in the morning. will continue monitoring. - JERSEY Melissa
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
## 2018-10-18 NOTE — CONSULT NOTE ADULT - ASSESSMENT
#Atrial fibrillation  CHADSVASC Planned for ablation with Dr Murray  - continue metoprolol tartrate 25mg PO BID #Atrial fibrillation  CHADSVASC 2. Planned for ablation with Dr Murray.  - continue metoprolol succinate 25mg PO BID  - continue telemonitoring in case arrhythmia returns  - Patient prefers to hold AC however she will discuss with her primary cardiologist Dr Shipley as well as with Dr Murray Ms Dalal is a 66 yo F with a PMH significant for idiopathic ventricular tachycardia, paroxysmal atrial fibrillation, chronic fatigue and prior left breast CA s/p surgery who presents after an episode of palpitations likely 2/2 known atrial arrhythmia though EKG on arrival normal.    #Atrial fibrillation  CHADSVASC 2. Planned for ablation with Dr Murray.  - continue metoprolol succinate 25mg PO BID  - continue telemonitoring in case arrhythmia returns  - Patient prefers to hold AC however she will discuss with her primary cardiologist Dr Shipley as well as with Dr Murray

## 2018-10-18 NOTE — CONSULT NOTE ADULT - ASSESSMENT
67 year old woman with PAF, CHADSVASC of 2, brief palpitations, possibly related to atrial fibrillation.  She can be discharged from the CDU with outpatient follow up with DR. Shipley.  I suggested she resume her ELiquis 5 BID and monitor for bleeding.  SHe will continue metoprolol 25 bid.  I advised that she can take an extra dose if she notes palpitations.  She will keep her scheduled appointment for atrial fibrillation ablation with DR. Murray.

## 2018-10-18 NOTE — ED CDU PROVIDER SUBSEQUENT DAY NOTE - PROGRESS NOTE DETAILS
CDU NOTE JERSEY FUNK: Pt resting comfortably, feeling well without complaint. NAD, VSS. No events on telemetry. EP cardio saw pt, recommend continuing telemonitoring and BB, will f/u in AM. will continue monitoring pt. CDU NOTE JERSEY FUNK: Pt resting comfortably, feeling well without complaint. NAD, VSS. No events on telemetry. Cardio saw pt, recommend pt f/u outpt, keep ablation as scheduled for November, continue metoprolol, restart eliquis. will continue monitoring. CDU NOTE JERSEY Ignacio: pt resting comfortably, feels well without complaint. NAD recent VSS. no events on tele. Patient signed out to me pending EP/Cards final recommendations and plan. Patient resting in bed comfortably, NAD. Ambulatory in CDU without any complaints. VSS. NSR on telemetry. Patient denies any new episodes of BRBPR, melena, easy bleeding/bruising. Patient seen by cardiologist, Dr. Canela, this AM recommending patient be d/c and continue Eliquis, monitor for bleeding, and continue metoprolol 25 BID.  Pt to f/u with Dr. Shipley as outpatient and f/u with scheduled appointment for ablation with Dr. Murray.  Called EP fellow Vanesa and confirmed plan. Patient given strict return precautions. D/w Dr. Page. - Berta Calabrese, PA-C

## 2018-11-15 ENCOUNTER — APPOINTMENT (OUTPATIENT)
Dept: CARDIOLOGY | Facility: CLINIC | Age: 68
End: 2018-11-15
Payer: MEDICARE

## 2018-11-15 VITALS
DIASTOLIC BLOOD PRESSURE: 68 MMHG | BODY MASS INDEX: 22.13 KG/M2 | OXYGEN SATURATION: 99 % | HEART RATE: 75 BPM | WEIGHT: 146 LBS | HEIGHT: 68 IN | SYSTOLIC BLOOD PRESSURE: 100 MMHG

## 2018-11-15 PROBLEM — I48.91 UNSPECIFIED ATRIAL FIBRILLATION: Chronic | Status: ACTIVE | Noted: 2018-10-17

## 2018-11-15 PROBLEM — K21.9 GASTRO-ESOPHAGEAL REFLUX DISEASE WITHOUT ESOPHAGITIS: Chronic | Status: ACTIVE | Noted: 2018-10-17

## 2018-11-15 PROCEDURE — 99214 OFFICE O/P EST MOD 30 MIN: CPT

## 2018-11-15 NOTE — PHYSICAL EXAM
[General Appearance - Well Developed] : well developed [Normal Appearance] : normal appearance [Well Groomed] : well groomed [General Appearance - Well Nourished] : well nourished [No Deformities] : no deformities [General Appearance - In No Acute Distress] : no acute distress [Normal Conjunctiva] : the conjunctiva exhibited no abnormalities [Eyelids - No Xanthelasma] : the eyelids demonstrated no xanthelasmas [Normal Oral Mucosa] : normal oral mucosa [No Oral Pallor] : no oral pallor [No Oral Cyanosis] : no oral cyanosis [Normal Jugular Venous A Waves Present] : normal jugular venous A waves present [Normal Jugular Venous V Waves Present] : normal jugular venous V waves present [No Jugular Venous Lucero A Waves] : no jugular venous lucero A waves [Respiration, Rhythm And Depth] : normal respiratory rhythm and effort [Exaggerated Use Of Accessory Muscles For Inspiration] : no accessory muscle use [Auscultation Breath Sounds / Voice Sounds] : lungs were clear to auscultation bilaterally [Abdomen Soft] : soft [Abdomen Tenderness] : non-tender [Abdomen Mass (___ Cm)] : no abdominal mass palpated [Abnormal Walk] : normal gait [Gait - Sufficient For Exercise Testing] : the gait was sufficient for exercise testing [Nail Clubbing] : no clubbing of the fingernails [Cyanosis, Localized] : no localized cyanosis [Petechial Hemorrhages (___cm)] : no petechial hemorrhages [Skin Color & Pigmentation] : normal skin color and pigmentation [] : no rash [No Venous Stasis] : no venous stasis [Skin Lesions] : no skin lesions [No Skin Ulcers] : no skin ulcer [No Xanthoma] : no  xanthoma was observed [Oriented To Time, Place, And Person] : oriented to person, place, and time [Affect] : the affect was normal [Mood] : the mood was normal [No Anxiety] : not feeling anxious [Normal Rate] : normal [Normal S1] : normal S1 [Normal S2] : normal S2 [S3] : no S3 [S4] : no S4 [No Murmur] : no murmurs heard [Right Carotid Bruit] : no bruit heard over the right carotid [Left Carotid Bruit] : no bruit heard over the left carotid [Right Femoral Bruit] : no bruit heard over the right femoral artery [Left Femoral Bruit] : no bruit heard over the left femoral artery [2+] : left 2+ [No Abnormalities] : the abdominal aorta was not enlarged and no bruit was heard [No Pitting Edema] : no pitting edema present

## 2018-11-15 NOTE — DISCUSSION/SUMMARY
[___ Week(s)] : [unfilled] week(s) [With Me] : with me [FreeTextEntry1] : Mrs. Dalal is a 67 year old female with a history of an unclear idiopathic atrial arrhythmia, breast ca s/p surgery and radiation, here for follow up.\par  \par Earlier this year, she was found to have atrial fibrillation, converted back to sinus rhythm. A 24 hour holter showed occasional APCs, with short self limited runs of PAT, but no AF\par \par Her blood pressure is at goal today. Her exam is unremarkable. An echocardiogram in the hospital showed normal LV systolic function.\par \par Her symptoms have resolved at this time. She will continue taking Toprol XL 25 bid\par \par Her CHADS2-vasc is 2, and although she does not have the serious risk factors, I will continue Eliquis 5 mg po bid for now. She is aware of the risk of bleeding, and will monitor.\par She has seen Dr. Murray of EP, and there is a plan for AF ablation at the end of November. I have again explained to her the risks of the procedure, and possibility of a non-pulmonary vein trigger.\par She knows to call with any issues or questions. She will follow up with me after the ablation.

## 2018-11-15 NOTE — HISTORY OF PRESENT ILLNESS
[FreeTextEntry1] : Mrs. Dalal is a 67 year old female with a history of an unclear idiopathic atrial arrhythmia, breast ca s/p surgery and radiation, here for follow up.\par  \par She was admitted to  on 7/12/2018 with palpitations. She was found to be in AF with RVR, and eventually converted back to SR with a cardizem gtt.\par She was unable to tolerate higher doses of Cardizem because of hypotension, and was sent home on Cardizem  mg daily. She was also started on a Eliquis 5 mg p.o. twice a day for CVA reduction.\par Echocardiogram was a difficult study, which showed normal LV systolic function and no significant valvular pathology\par \par Today, she is here for followup.  I last saw her 9/26/18.\par She was started on Toprol-XL 25 mg p.o. daily, and is currently taking it bid. She had an episode of palpitations in 8/2018 in [facility], and was found to be in SR at the ER.\par Since last visit, she presented to the [facility] in 10/2018 for similar complaints; she was sent home in SR.\par Her palpitations have since resolved. She had a single episode of blood in the stool about 2 months ago, and this has also resolved. She is taking Eliquis without issue.\par \par Today, she denies chest pain, difficulty breathing, abdominal pain, dizziness, lightheadedness, and other associated symptoms. She reports compliance with her medications. She denies abnormal bleeding.\par She had a 24 hour holter which showed APCs, and short runs of PAT (longest being 10 beats in duration at a HR of 124) but there was no evidence of atrial fibrillation.\par

## 2018-11-27 ENCOUNTER — OUTPATIENT (OUTPATIENT)
Dept: OUTPATIENT SERVICES | Facility: HOSPITAL | Age: 68
LOS: 1 days | End: 2018-11-27
Payer: MEDICARE

## 2018-11-27 VITALS
HEART RATE: 88 BPM | SYSTOLIC BLOOD PRESSURE: 109 MMHG | WEIGHT: 143.08 LBS | HEIGHT: 68 IN | RESPIRATION RATE: 18 BRPM | DIASTOLIC BLOOD PRESSURE: 65 MMHG | TEMPERATURE: 98 F | OXYGEN SATURATION: 97 %

## 2018-11-27 DIAGNOSIS — I49.9 CARDIAC ARRHYTHMIA, UNSPECIFIED: ICD-10-CM

## 2018-11-27 DIAGNOSIS — Z01.818 ENCOUNTER FOR OTHER PREPROCEDURAL EXAMINATION: ICD-10-CM

## 2018-11-27 DIAGNOSIS — Z98.890 OTHER SPECIFIED POSTPROCEDURAL STATES: Chronic | ICD-10-CM

## 2018-11-27 DIAGNOSIS — Z90.721 ACQUIRED ABSENCE OF OVARIES, UNILATERAL: Chronic | ICD-10-CM

## 2018-11-27 LAB
ALBUMIN SERPL ELPH-MCNC: 4.4 G/DL — SIGNIFICANT CHANGE UP (ref 3.3–5)
ALP SERPL-CCNC: 96 U/L — SIGNIFICANT CHANGE UP (ref 40–120)
ALT FLD-CCNC: 15 U/L — SIGNIFICANT CHANGE UP (ref 10–45)
ANION GAP SERPL CALC-SCNC: 10 MMOL/L — SIGNIFICANT CHANGE UP (ref 5–17)
APTT BLD: 34 SEC — SIGNIFICANT CHANGE UP (ref 27.5–36.3)
AST SERPL-CCNC: 21 U/L — SIGNIFICANT CHANGE UP (ref 10–40)
BILIRUB SERPL-MCNC: 0.4 MG/DL — SIGNIFICANT CHANGE UP (ref 0.2–1.2)
BLD GP AB SCN SERPL QL: NEGATIVE — SIGNIFICANT CHANGE UP
BUN SERPL-MCNC: 13 MG/DL — SIGNIFICANT CHANGE UP (ref 7–23)
CALCIUM SERPL-MCNC: 9.9 MG/DL — SIGNIFICANT CHANGE UP (ref 8.4–10.5)
CHLORIDE SERPL-SCNC: 102 MMOL/L — SIGNIFICANT CHANGE UP (ref 96–108)
CO2 SERPL-SCNC: 29 MMOL/L — SIGNIFICANT CHANGE UP (ref 22–31)
CREAT SERPL-MCNC: 0.71 MG/DL — SIGNIFICANT CHANGE UP (ref 0.5–1.3)
GLUCOSE SERPL-MCNC: 82 MG/DL — SIGNIFICANT CHANGE UP (ref 70–99)
HCT VFR BLD CALC: 42.4 % — SIGNIFICANT CHANGE UP (ref 34.5–45)
HGB BLD-MCNC: 14.2 G/DL — SIGNIFICANT CHANGE UP (ref 11.5–15.5)
INR BLD: 0.94 RATIO — SIGNIFICANT CHANGE UP (ref 0.88–1.16)
MCHC RBC-ENTMCNC: 29 PG — SIGNIFICANT CHANGE UP (ref 27–34)
MCHC RBC-ENTMCNC: 33.5 GM/DL — SIGNIFICANT CHANGE UP (ref 32–36)
MCV RBC AUTO: 86.7 FL — SIGNIFICANT CHANGE UP (ref 80–100)
PLATELET # BLD AUTO: 189 K/UL — SIGNIFICANT CHANGE UP (ref 150–400)
POTASSIUM SERPL-MCNC: 4.4 MMOL/L — SIGNIFICANT CHANGE UP (ref 3.5–5.3)
POTASSIUM SERPL-SCNC: 4.4 MMOL/L — SIGNIFICANT CHANGE UP (ref 3.5–5.3)
PROT SERPL-MCNC: 7.2 G/DL — SIGNIFICANT CHANGE UP (ref 6–8.3)
PROTHROM AB SERPL-ACNC: 10.7 SEC — SIGNIFICANT CHANGE UP (ref 10–12.9)
RBC # BLD: 4.9 M/UL — SIGNIFICANT CHANGE UP (ref 3.8–5.2)
RBC # FLD: 12.6 % — SIGNIFICANT CHANGE UP (ref 10.3–14.5)
RH IG SCN BLD-IMP: POSITIVE — SIGNIFICANT CHANGE UP
SODIUM SERPL-SCNC: 141 MMOL/L — SIGNIFICANT CHANGE UP (ref 135–145)
WBC # BLD: 4.9 K/UL — SIGNIFICANT CHANGE UP (ref 3.8–10.5)
WBC # FLD AUTO: 4.9 K/UL — SIGNIFICANT CHANGE UP (ref 3.8–10.5)

## 2018-11-27 PROCEDURE — 93010 ELECTROCARDIOGRAM REPORT: CPT

## 2018-11-27 RX ORDER — ASPIRIN/ACETAMINOPHEN/CAFFEINE 250-250-65
30 TABLET ORAL
Qty: 0 | Refills: 0 | COMMUNITY

## 2018-11-27 NOTE — H&P CARDIOLOGY - FAMILY HISTORY
Father  Still living? No  Family history of liver cancer, Age at diagnosis: Age Unknown  Family history of heart disease, Age at diagnosis: Age Unknown     Mother  Still living? Unknown  Family history of lung cancer, Age at diagnosis: Age Unknown

## 2018-11-27 NOTE — H&P CARDIOLOGY - HISTORY OF PRESENT ILLNESS
66 yo  female with PMH significant for infrequent idiopathic ventricular tachycardia, paroxysmal atrial fibrillation, left breast CA s/p surgery (10/2016 lumpectomy and RTX) who presents for PST for a-fib ablation tomorrow. Pt reports she had light headed and palpitation in July 12/2018 converted with IV cardizem in hospital and had f/u visit with Dr. Shipley. Pt had another episode of a-fib ion 12/2018, was in ER converted on its own. 66 yo  female with PMH significant for infrequent idiopathic ventricular tachycardia, paroxysmal atrial fibrillation, left breast CA s/p surgery (10/2016 lumpectomy and RTX) who presents for PST for a-fib ablation tomorrow. Pt reports she had light headed and palpitation in July 12/2018 converted with IV Cardizem in hospital and had f/u visit with Dr. Shipley. Pt had another episode of a-fib ion 12/2018, was in ER converted on its own. Pt currently denies chest pain, palpitation or SOB.     < from: TTE Echo Doppler w/o Cont (07.12.18 @ 15:54) >  Left Atrium: Normal  Right Atrium: Normal  Left Ventricle: Endocardium is not well-visualized. Overall preserved left ventricular systolic function. The EF is approximately 65%.  Right Ventricle: Normal right ventricular size and function.  Pericardium/Pleura: No pericardial effusion noted. Pulmonary/RV Pressure: The right ventricular systolic pressure is   estimated to be 25mmHg, assuming that the right atrial pressure is estimated to be 8 mmHg. This is consistent with normal pulmonary pressures.  LV Diastolic Function: Normal diastolic function.  Conclusion: Technically difficult study. Overall preserved left ventricular systolic function.   < end of copied text >

## 2018-11-28 ENCOUNTER — TRANSCRIPTION ENCOUNTER (OUTPATIENT)
Age: 68
End: 2018-11-28

## 2018-11-28 ENCOUNTER — OUTPATIENT (OUTPATIENT)
Dept: INPATIENT UNIT | Facility: HOSPITAL | Age: 68
LOS: 1 days | End: 2018-11-28
Payer: MEDICARE

## 2018-11-28 VITALS
HEART RATE: 113 BPM | DIASTOLIC BLOOD PRESSURE: 49 MMHG | RESPIRATION RATE: 22 BRPM | SYSTOLIC BLOOD PRESSURE: 122 MMHG | WEIGHT: 150.8 LBS | HEIGHT: 68 IN | TEMPERATURE: 98 F | OXYGEN SATURATION: 98 %

## 2018-11-28 DIAGNOSIS — I49.9 CARDIAC ARRHYTHMIA, UNSPECIFIED: ICD-10-CM

## 2018-11-28 DIAGNOSIS — Z98.890 OTHER SPECIFIED POSTPROCEDURAL STATES: Chronic | ICD-10-CM

## 2018-11-28 DIAGNOSIS — Z90.721 ACQUIRED ABSENCE OF OVARIES, UNILATERAL: Chronic | ICD-10-CM

## 2018-11-28 LAB
ALBUMIN SERPL ELPH-MCNC: 4 G/DL — SIGNIFICANT CHANGE UP (ref 3.3–5)
ALP SERPL-CCNC: 91 U/L — SIGNIFICANT CHANGE UP (ref 40–120)
ALT FLD-CCNC: 13 U/L — SIGNIFICANT CHANGE UP (ref 10–45)
ANION GAP SERPL CALC-SCNC: 14 MMOL/L — SIGNIFICANT CHANGE UP (ref 5–17)
APTT BLD: >200 SEC — CRITICAL HIGH (ref 27.5–36.3)
AST SERPL-CCNC: 33 U/L — SIGNIFICANT CHANGE UP (ref 10–40)
BASOPHILS # BLD AUTO: 0 K/UL — SIGNIFICANT CHANGE UP (ref 0–0.2)
BASOPHILS NFR BLD AUTO: 0.1 % — SIGNIFICANT CHANGE UP (ref 0–2)
BILIRUB SERPL-MCNC: 0.4 MG/DL — SIGNIFICANT CHANGE UP (ref 0.2–1.2)
BUN SERPL-MCNC: 10 MG/DL — SIGNIFICANT CHANGE UP (ref 7–23)
CALCIUM SERPL-MCNC: 8.6 MG/DL — SIGNIFICANT CHANGE UP (ref 8.4–10.5)
CHLORIDE SERPL-SCNC: 109 MMOL/L — HIGH (ref 96–108)
CO2 SERPL-SCNC: 21 MMOL/L — LOW (ref 22–31)
CREAT SERPL-MCNC: 0.72 MG/DL — SIGNIFICANT CHANGE UP (ref 0.5–1.3)
EOSINOPHIL # BLD AUTO: 0 K/UL — SIGNIFICANT CHANGE UP (ref 0–0.5)
EOSINOPHIL NFR BLD AUTO: 0.2 % — SIGNIFICANT CHANGE UP (ref 0–6)
GLUCOSE SERPL-MCNC: 160 MG/DL — HIGH (ref 70–99)
HCT VFR BLD CALC: 41.2 % — SIGNIFICANT CHANGE UP (ref 34.5–45)
HGB BLD-MCNC: 13.9 G/DL — SIGNIFICANT CHANGE UP (ref 11.5–15.5)
INR BLD: 1.16 RATIO — SIGNIFICANT CHANGE UP (ref 0.88–1.16)
LYMPHOCYTES # BLD AUTO: 0.8 K/UL — LOW (ref 1–3.3)
LYMPHOCYTES # BLD AUTO: 7.2 % — LOW (ref 13–44)
MAGNESIUM SERPL-MCNC: 1.9 MG/DL — SIGNIFICANT CHANGE UP (ref 1.6–2.6)
MCHC RBC-ENTMCNC: 29.2 PG — SIGNIFICANT CHANGE UP (ref 27–34)
MCHC RBC-ENTMCNC: 33.8 GM/DL — SIGNIFICANT CHANGE UP (ref 32–36)
MCV RBC AUTO: 86.5 FL — SIGNIFICANT CHANGE UP (ref 80–100)
MONOCYTES # BLD AUTO: 0 K/UL — SIGNIFICANT CHANGE UP (ref 0–0.9)
MONOCYTES NFR BLD AUTO: 0.4 % — LOW (ref 2–14)
NEUTROPHILS # BLD AUTO: 10.4 K/UL — HIGH (ref 1.8–7.4)
NEUTROPHILS NFR BLD AUTO: 92 % — HIGH (ref 43–77)
PHOSPHATE SERPL-MCNC: 3.2 MG/DL — SIGNIFICANT CHANGE UP (ref 2.5–4.5)
PLATELET # BLD AUTO: 178 K/UL — SIGNIFICANT CHANGE UP (ref 150–400)
POTASSIUM SERPL-MCNC: 3.5 MMOL/L — SIGNIFICANT CHANGE UP (ref 3.5–5.3)
POTASSIUM SERPL-SCNC: 3.5 MMOL/L — SIGNIFICANT CHANGE UP (ref 3.5–5.3)
PROT SERPL-MCNC: 6.6 G/DL — SIGNIFICANT CHANGE UP (ref 6–8.3)
PROTHROM AB SERPL-ACNC: 13.4 SEC — HIGH (ref 10–12.9)
RBC # BLD: 4.76 M/UL — SIGNIFICANT CHANGE UP (ref 3.8–5.2)
RBC # FLD: 12.7 % — SIGNIFICANT CHANGE UP (ref 10.3–14.5)
SODIUM SERPL-SCNC: 144 MMOL/L — SIGNIFICANT CHANGE UP (ref 135–145)
WBC # BLD: 11.3 K/UL — HIGH (ref 3.8–10.5)
WBC # FLD AUTO: 11.3 K/UL — HIGH (ref 3.8–10.5)

## 2018-11-28 RX ORDER — ACETAMINOPHEN 500 MG
1000 TABLET ORAL ONCE
Qty: 0 | Refills: 0 | Status: COMPLETED | OUTPATIENT
Start: 2018-11-28 | End: 2018-11-28

## 2018-11-28 RX ORDER — OXYCODONE HYDROCHLORIDE 5 MG/1
5 TABLET ORAL ONCE
Qty: 0 | Refills: 0 | Status: DISCONTINUED | OUTPATIENT
Start: 2018-11-28 | End: 2018-11-28

## 2018-11-28 RX ORDER — METOPROLOL TARTRATE 50 MG
25 TABLET ORAL
Qty: 0 | Refills: 0 | Status: DISCONTINUED | OUTPATIENT
Start: 2018-11-28 | End: 2018-11-29

## 2018-11-28 RX ORDER — SODIUM CHLORIDE 9 MG/ML
1000 INJECTION INTRAMUSCULAR; INTRAVENOUS; SUBCUTANEOUS
Qty: 0 | Refills: 0 | Status: DISCONTINUED | OUTPATIENT
Start: 2018-11-28 | End: 2018-11-28

## 2018-11-28 RX ORDER — POTASSIUM CHLORIDE 20 MEQ
40 PACKET (EA) ORAL ONCE
Qty: 0 | Refills: 0 | Status: COMPLETED | OUTPATIENT
Start: 2018-11-28 | End: 2018-11-28

## 2018-11-28 RX ORDER — CYCLOBENZAPRINE HYDROCHLORIDE 10 MG/1
5 TABLET, FILM COATED ORAL THREE TIMES A DAY
Qty: 0 | Refills: 0 | Status: DISCONTINUED | OUTPATIENT
Start: 2018-11-28 | End: 2018-11-29

## 2018-11-28 RX ORDER — ACYCLOVIR SODIUM 500 MG
400 VIAL (EA) INTRAVENOUS
Qty: 0 | Refills: 0 | Status: DISCONTINUED | OUTPATIENT
Start: 2018-11-28 | End: 2018-11-29

## 2018-11-28 RX ORDER — PANTOPRAZOLE SODIUM 20 MG/1
40 TABLET, DELAYED RELEASE ORAL
Qty: 0 | Refills: 0 | Status: DISCONTINUED | OUTPATIENT
Start: 2018-11-28 | End: 2018-11-29

## 2018-11-28 RX ORDER — APIXABAN 2.5 MG/1
5 TABLET, FILM COATED ORAL EVERY 12 HOURS
Qty: 0 | Refills: 0 | Status: DISCONTINUED | OUTPATIENT
Start: 2018-11-28 | End: 2018-11-29

## 2018-11-28 RX ADMIN — Medication 400 MILLIGRAM(S): at 14:49

## 2018-11-28 RX ADMIN — CYCLOBENZAPRINE HYDROCHLORIDE 5 MILLIGRAM(S): 10 TABLET, FILM COATED ORAL at 16:21

## 2018-11-28 RX ADMIN — OXYCODONE HYDROCHLORIDE 5 MILLIGRAM(S): 5 TABLET ORAL at 19:13

## 2018-11-28 RX ADMIN — Medication 25 MILLIGRAM(S): at 14:28

## 2018-11-28 RX ADMIN — Medication 400 MILLIGRAM(S): at 16:21

## 2018-11-28 RX ADMIN — OXYCODONE HYDROCHLORIDE 5 MILLIGRAM(S): 5 TABLET ORAL at 19:43

## 2018-11-28 RX ADMIN — APIXABAN 5 MILLIGRAM(S): 2.5 TABLET, FILM COATED ORAL at 22:43

## 2018-11-28 RX ADMIN — Medication 40 MILLIEQUIVALENT(S): at 23:24

## 2018-11-28 RX ADMIN — Medication 1000 MILLIGRAM(S): at 16:00

## 2018-11-28 NOTE — PROGRESS NOTE ADULT - SUBJECTIVE AND OBJECTIVE BOX
Pre-op Diagnosis: PAF    Post-op Diagnosis: Same    Procedure: EPS/ablation    Electrophysiologist: Trevor    Assistant: Cornelius    Anesthesia: Anca    Access: RFV (sonosite guided)    Description:  The patient presented to the electophysiology laboratory in sinus rhythm.  With the aid of intracardiac echo and fluoroscopy transeptal puncture was performed (mean LA= 10 mmHg).  A 3D electroanatomic shell of the LA was created with Carto 3.  Empiric isolation of the PVs was performed.  Wide area ablation was required around the rights secondary to diaphragmatic capture. Entrance and exit block was achieved.  The patient was challenged with isuprel up to 20 mcg/min.  There were occastional APDs, but no atrial fibrillation.  There was no acute PV reconnectivity.     Complications: none    EBL: < 50 cc    Disposition: PICU     Plan: medical therapy.

## 2018-11-28 NOTE — CHART NOTE - NSCHARTNOTEFT_GEN_A_CORE
====================  CCU MIDNIGHT ROUNDS  ====================    EULALIO RASHID  [id number]    ====================  SUMMARY: HPI:    ====================  66 y/o  female with PMH significant for infrequent idiopathic ventricular tachycardia, paroxysmal atrial fibrillation, left breast CA s/p surgery (10/2016 lumpectomy and RTX) who presents s/p a-fib ablation today. Pt reports she had light headed and palpitation in July 12/2018 converted with IV Cardizem in hospital and had f/u visit with Dr. Shipley. Pt had another episode of a-fib ion 12/2018, was in ER converted on its own.    ====================  NEW EVENTS:  ====================  Sutures removed without bleeding, ecchymosis or hematoma. Denies CP, SOB, palpitations.      ====================  VITALS (Last 12 hrs):  ====================    T(C): 37.1 (11-28-18 @ 20:00), Max: 37.1 (11-28-18 @ 20:00)  HR: 97 (11-28-18 @ 22:00) (89 - 118)  BP: 102/62 (11-28-18 @ 22:00) (102/62 - 125/68)  BP(mean): 75 (11-28-18 @ 22:00) (71 - 95)  RR: 18 (11-28-18 @ 22:00) (12 - 22)  SpO2: 98% (11-28-18 @ 21:00) (96% - 98%)      TELEMETRY: Sinus tach rate 100      I&O's Summary    28 Nov 2018 07:01  -  28 Nov 2018 23:10  --------------------------------------------------------  IN: 120 mL / OUT: 1500 mL / NET: -1380 mL        ====================  PLAN:  ====================    s/p AF ablation now SR  - Continue mec soft diet and PPI  - Continue all home meds including Metoprolol, Eliquis restarted  - OOB  - DC home tomorrow    Kvng Mabry, CCU PA-C  #[id number]

## 2018-11-28 NOTE — CHART NOTE - NSCHARTNOTEFT_GEN_A_CORE
Chief Complaint:    Subjective:     Objective:  Vital Signs Last 24 Hrs  T(C): 36.9 (28 Nov 2018 13:36), Max: 36.9 (28 Nov 2018 13:36)  T(F): 98.4 (28 Nov 2018 13:36), Max: 98.4 (28 Nov 2018 13:36)  HR: 113 (28 Nov 2018 13:36) (113 - 113)  BP: 122/49 (28 Nov 2018 13:36) (122/49 - 122/49)  BP(mean): 72 (28 Nov 2018 13:36) (72 - 72)  RR: 22 (28 Nov 2018 13:36) (22 - 22)  SpO2: 98% (28 Nov 2018 13:36) (98% - 98%)  ICU Vital Signs Last 24 Hrs  T(C): 36.9 (28 Nov 2018 13:36), Max: 36.9 (28 Nov 2018 13:36)  T(F): 98.4 (28 Nov 2018 13:36), Max: 98.4 (28 Nov 2018 13:36)  HR: 113 (28 Nov 2018 13:36) (113 - 113)  BP: 122/49 (28 Nov 2018 13:36) (122/49 - 122/49)  BP(mean): 72 (28 Nov 2018 13:36) (72 - 72)  ABP: --  ABP(mean): --  RR: 22 (28 Nov 2018 13:36) (22 - 22)  SpO2: 98% (28 Nov 2018 13:36) (98% - 98%)    I&O's Summary    Daily Height in cm: 172.72 (28 Nov 2018 13:36)    Daily     PHYSICAL EXAM:   General:   HEENT:  Cardiovascular:   Respiratory:   Gastrointestinal:  Genitourinary:   Integumentary:   Musculoskeletal:   Hematologic/Lymphatic:   Endocrine:   Neurologic:   Psychologic:     TELEMETRY:     EKG:     CARDIAC CATH:     ECHO:                          14.2   4.9   )-----------( 189      ( 27 Nov 2018 10:09 )             42.4     11-27    141  |  102  |  13  ----------------------------<  82  4.4   |  29  |  0.71    Ca    9.9      27 Nov 2018 10:09    TPro  7.2  /  Alb  4.4  /  TBili  0.4  /  DBili  x   /  AST  21  /  ALT  15  /  AlkPhos  96  11-27    LIVER FUNCTIONS - ( 27 Nov 2018 10:09 )  Alb: 4.4 g/dL / Pro: 7.2 g/dL / ALK PHOS: 96 U/L / ALT: 15 U/L / AST: 21 U/L / GGT: x             PT/INR - ( 27 Nov 2018 10:09 )   PT: 10.7 sec;   INR: 0.94 ratio         PTT - ( 27 Nov 2018 10:09 )  PTT:34.0 sec      Assessment/Plan:  HPI:    HEALTH ISSUES - PROBLEM Dx:        HEALTH ISSUES - R/O PROBLEM Dx:      Zhane Parish CCU NP   # Chief Complaint: Atrial Fibrillation    Subjective: Pt was complaining of shivering of the right hand and tingling to her bottom right foot. Upon exam shivering was not present. Pt had equal strength throughout all four extremities without neuro deficits. No numbness present. Tylenol IV ordered for post-op shivering. Pt's shivering resolved.    Objective:  Vital Signs Last 24 Hrs  T(C): 36.9 (28 Nov 2018 13:36), Max: 36.9 (28 Nov 2018 13:36)  T(F): 98.4 (28 Nov 2018 13:36), Max: 98.4 (28 Nov 2018 13:36)  HR: 113 (28 Nov 2018 13:36) (113 - 113)  BP: 122/49 (28 Nov 2018 13:36) (122/49 - 122/49)  BP(mean): 72 (28 Nov 2018 13:36) (72 - 72)  RR: 22 (28 Nov 2018 13:36) (22 - 22)  SpO2: 98% (28 Nov 2018 13:36) (98% - 98%)  ICU Vital Signs Last 24 Hrs  T(C): 36.9 (28 Nov 2018 13:36), Max: 36.9 (28 Nov 2018 13:36)  T(F): 98.4 (28 Nov 2018 13:36), Max: 98.4 (28 Nov 2018 13:36)  HR: 113 (28 Nov 2018 13:36) (113 - 113)  BP: 122/49 (28 Nov 2018 13:36) (122/49 - 122/49)  BP(mean): 72 (28 Nov 2018 13:36) (72 - 72)  RR: 22 (28 Nov 2018 13:36) (22 - 22)  SpO2: 98% (28 Nov 2018 13:36) (98% - 98%)    I&O's Summary    Daily Height in cm: 172.72 (28 Nov 2018 13:36)    Daily     PHYSICAL EXAM:   General: WAF in NAD  Cardiovascular: S1, S2. B/L groin sutures C/D/I without bleeding  Respiratory: CTA B/L  Gastrointestinal: abd soft, NT, ND  Genitourinary: voiding  Neurologic: A and O x 4  Psychologic: mildly anxious    TELEMETRY: SR 90s    EKG: SR 90s                          14.2   4.9   )-----------( 189      ( 27 Nov 2018 10:09 )             42.4     11-27    141  |  102  |  13  ----------------------------<  82  4.4   |  29  |  0.71    Ca    9.9      27 Nov 2018 10:09    TPro  7.2  /  Alb  4.4  /  TBili  0.4  /  DBili  x   /  AST  21  /  ALT  15  /  AlkPhos  96  11-27    LIVER FUNCTIONS - ( 27 Nov 2018 10:09 )  Alb: 4.4 g/dL / Pro: 7.2 g/dL / ALK PHOS: 96 U/L / ALT: 15 U/L / AST: 21 U/L / GGT: x             PT/INR - ( 27 Nov 2018 10:09 )   PT: 10.7 sec;   INR: 0.94 ratio      PTT - ( 27 Nov 2018 10:09 )  PTT:34.0 sec    A/P  AF ablation  - Continue mech soft and PPI  - Continue all home meds including Metoprolol    Alicia Hughes CCU NP   #[id number]

## 2018-11-29 VITALS
HEART RATE: 113 BPM | TEMPERATURE: 98 F | DIASTOLIC BLOOD PRESSURE: 77 MMHG | SYSTOLIC BLOOD PRESSURE: 118 MMHG | OXYGEN SATURATION: 97 % | RESPIRATION RATE: 18 BRPM

## 2018-11-29 DIAGNOSIS — I48.91 UNSPECIFIED ATRIAL FIBRILLATION: ICD-10-CM

## 2018-11-29 LAB
ALBUMIN SERPL ELPH-MCNC: 3.7 G/DL — SIGNIFICANT CHANGE UP (ref 3.3–5)
ALP SERPL-CCNC: 83 U/L — SIGNIFICANT CHANGE UP (ref 40–120)
ALT FLD-CCNC: 16 U/L — SIGNIFICANT CHANGE UP (ref 10–45)
ANION GAP SERPL CALC-SCNC: 13 MMOL/L — SIGNIFICANT CHANGE UP (ref 5–17)
APTT BLD: 28 SEC — SIGNIFICANT CHANGE UP (ref 27.5–36.3)
AST SERPL-CCNC: 31 U/L — SIGNIFICANT CHANGE UP (ref 10–40)
BASOPHILS # BLD AUTO: 0 K/UL — SIGNIFICANT CHANGE UP (ref 0–0.2)
BASOPHILS NFR BLD AUTO: 0.1 % — SIGNIFICANT CHANGE UP (ref 0–2)
BILIRUB SERPL-MCNC: 0.3 MG/DL — SIGNIFICANT CHANGE UP (ref 0.2–1.2)
BUN SERPL-MCNC: 12 MG/DL — SIGNIFICANT CHANGE UP (ref 7–23)
CALCIUM SERPL-MCNC: 8.8 MG/DL — SIGNIFICANT CHANGE UP (ref 8.4–10.5)
CHLORIDE SERPL-SCNC: 106 MMOL/L — SIGNIFICANT CHANGE UP (ref 96–108)
CO2 SERPL-SCNC: 22 MMOL/L — SIGNIFICANT CHANGE UP (ref 22–31)
CREAT SERPL-MCNC: 0.64 MG/DL — SIGNIFICANT CHANGE UP (ref 0.5–1.3)
EOSINOPHIL # BLD AUTO: 0 K/UL — SIGNIFICANT CHANGE UP (ref 0–0.5)
EOSINOPHIL NFR BLD AUTO: 0.1 % — SIGNIFICANT CHANGE UP (ref 0–6)
GLUCOSE SERPL-MCNC: 102 MG/DL — HIGH (ref 70–99)
HCT VFR BLD CALC: 37.6 % — SIGNIFICANT CHANGE UP (ref 34.5–45)
HGB BLD-MCNC: 12.6 G/DL — SIGNIFICANT CHANGE UP (ref 11.5–15.5)
INR BLD: 1.17 RATIO — HIGH (ref 0.88–1.16)
LYMPHOCYTES # BLD AUTO: 1 K/UL — SIGNIFICANT CHANGE UP (ref 1–3.3)
LYMPHOCYTES # BLD AUTO: 10 % — LOW (ref 13–44)
MAGNESIUM SERPL-MCNC: 2 MG/DL — SIGNIFICANT CHANGE UP (ref 1.6–2.6)
MCHC RBC-ENTMCNC: 29 PG — SIGNIFICANT CHANGE UP (ref 27–34)
MCHC RBC-ENTMCNC: 33.4 GM/DL — SIGNIFICANT CHANGE UP (ref 32–36)
MCV RBC AUTO: 86.7 FL — SIGNIFICANT CHANGE UP (ref 80–100)
MONOCYTES # BLD AUTO: 0.7 K/UL — SIGNIFICANT CHANGE UP (ref 0–0.9)
MONOCYTES NFR BLD AUTO: 6.8 % — SIGNIFICANT CHANGE UP (ref 2–14)
NEUTROPHILS # BLD AUTO: 8.1 K/UL — HIGH (ref 1.8–7.4)
NEUTROPHILS NFR BLD AUTO: 82.9 % — HIGH (ref 43–77)
PHOSPHATE SERPL-MCNC: 3.4 MG/DL — SIGNIFICANT CHANGE UP (ref 2.5–4.5)
PLATELET # BLD AUTO: 179 K/UL — SIGNIFICANT CHANGE UP (ref 150–400)
POTASSIUM SERPL-MCNC: 4.1 MMOL/L — SIGNIFICANT CHANGE UP (ref 3.5–5.3)
POTASSIUM SERPL-SCNC: 4.1 MMOL/L — SIGNIFICANT CHANGE UP (ref 3.5–5.3)
PROT SERPL-MCNC: 6.1 G/DL — SIGNIFICANT CHANGE UP (ref 6–8.3)
PROTHROM AB SERPL-ACNC: 13.5 SEC — HIGH (ref 10–12.9)
RBC # BLD: 4.34 M/UL — SIGNIFICANT CHANGE UP (ref 3.8–5.2)
RBC # FLD: 12.2 % — SIGNIFICANT CHANGE UP (ref 10.3–14.5)
SODIUM SERPL-SCNC: 141 MMOL/L — SIGNIFICANT CHANGE UP (ref 135–145)
WBC # BLD: 9.7 K/UL — SIGNIFICANT CHANGE UP (ref 3.8–10.5)
WBC # FLD AUTO: 9.7 K/UL — SIGNIFICANT CHANGE UP (ref 3.8–10.5)

## 2018-11-29 PROCEDURE — C1894: CPT

## 2018-11-29 PROCEDURE — C1766: CPT

## 2018-11-29 PROCEDURE — 83735 ASSAY OF MAGNESIUM: CPT

## 2018-11-29 PROCEDURE — C1732: CPT

## 2018-11-29 PROCEDURE — C1893: CPT

## 2018-11-29 PROCEDURE — 85730 THROMBOPLASTIN TIME PARTIAL: CPT

## 2018-11-29 PROCEDURE — 85610 PROTHROMBIN TIME: CPT

## 2018-11-29 PROCEDURE — C1730: CPT

## 2018-11-29 PROCEDURE — 84100 ASSAY OF PHOSPHORUS: CPT

## 2018-11-29 PROCEDURE — 93656 COMPRE EP EVAL ABLTJ ATR FIB: CPT

## 2018-11-29 PROCEDURE — C8929: CPT

## 2018-11-29 PROCEDURE — 93623 PRGRMD STIMJ&PACG IV RX NFS: CPT

## 2018-11-29 PROCEDURE — 93662 INTRACARDIAC ECG (ICE): CPT

## 2018-11-29 PROCEDURE — C1731: CPT

## 2018-11-29 PROCEDURE — 86900 BLOOD TYPING SEROLOGIC ABO: CPT

## 2018-11-29 PROCEDURE — 86850 RBC ANTIBODY SCREEN: CPT

## 2018-11-29 PROCEDURE — 93613 INTRACARDIAC EPHYS 3D MAPG: CPT

## 2018-11-29 PROCEDURE — 80053 COMPREHEN METABOLIC PANEL: CPT

## 2018-11-29 PROCEDURE — 85027 COMPLETE CBC AUTOMATED: CPT

## 2018-11-29 PROCEDURE — 93005 ELECTROCARDIOGRAM TRACING: CPT

## 2018-11-29 PROCEDURE — 86901 BLOOD TYPING SEROLOGIC RH(D): CPT

## 2018-11-29 RX ORDER — PANTOPRAZOLE SODIUM 20 MG/1
1 TABLET, DELAYED RELEASE ORAL
Qty: 30 | Refills: 0 | OUTPATIENT
Start: 2018-11-29 | End: 2018-12-28

## 2018-11-29 RX ADMIN — APIXABAN 5 MILLIGRAM(S): 2.5 TABLET, FILM COATED ORAL at 09:47

## 2018-11-29 RX ADMIN — PANTOPRAZOLE SODIUM 40 MILLIGRAM(S): 20 TABLET, DELAYED RELEASE ORAL at 05:25

## 2018-11-29 RX ADMIN — Medication 25 MILLIGRAM(S): at 05:24

## 2018-11-29 RX ADMIN — Medication 400 MILLIGRAM(S): at 05:24

## 2018-11-29 NOTE — CONSULT NOTE ADULT - ASSESSMENT
68 year old woman with a history of idiopathic ventricular tachycardia, paroxysmal atrial fibrillation, chronic fatigue and prior left  breast CA s/p surgery. She had several episodes of PAF, managed with diltiazem, metoprolol and Eliquis. She tends to run a low blood pressure. Eliquis was stopped at one point secondary to rectal bleeding. She saw Dr Murray in the office and was planned for ablation on November 28.  Ablation was performed yesterday.  she has noted some swelling in her hands this am. she has noted a degree of persistent acceleration in her hr, which is usually around 80, but this am is ~100. She denies any dyspnea, but admits to a little reflux type discomfort.    She is in sinus rhythm.  Her borderline tachycardia and labile bp (most recently 80s) are a concern.    Recommend:    -there is no evidence of acute ischemia, and would not pursue an ischemic evaluation at this time.  -there is no evidence for meaningful  volume overload, and if she remains with a tendency toward significant hypotension would utilize fluids liberally as needed  -she is maintaining sr, but her hr is elevated relative to her usual baseline.  unclear if this relates to pain or anxiety, but it is reasonable to obtain an echo to assess for effusion, etc post procedure  -cont metoprolol as fany  -cont eliquis for now  -follow hr and bp carefully, and other causes may need to be considered pending her clinical course  -cont ppi  -monitor electrolytes, keep k>4, Mg>2  -will follow while remains hospitalized

## 2018-11-29 NOTE — PROGRESS NOTE ADULT - ASSESSMENT
68 yo female with PMH significant for infrequent idiopathic ventricular tachycardia, paroxysmal atrial fibrillation, left breast CA s/p surgery (10/2016 lumpectomy and RTX) admitted for a-fib ablation; now s/p ablation

## 2018-11-29 NOTE — ANESTHESIA FOLLOW-UP NOTE - NSEVALATIONFT_GEN_ALL_CORE
Patient claims she had tremors and numbness in her left foot post op, both of which have since resolved

## 2018-11-29 NOTE — DISCHARGE NOTE ADULT - CARE PROVIDERS DIRECT ADDRESSES
[email or web address],DirectAddress_Unknown

## 2018-11-29 NOTE — DISCHARGE NOTE ADULT - CARE PROVIDER_API CALL
Wilian Murray), Cardiac Electrophysiology; Cardiology  [address] [place] [address]  Phone: [phone]  Fax: [phone] Wilian Murray), Cardiac Electrophysiology; Cardiology  [address] [place] [address]  Phone: [phone]  Fax: [phone]    Jose Maria Shipley), Internal Medicine  [address] [place], [place] [id number]  Phone: [phone]  Fax: [phone]

## 2018-11-29 NOTE — DISCHARGE NOTE ADULT - CONDITIONS AT DISCHARGE
VSS, pt denies chest pain or SOB. ivl discontinued site benign. Right groin s/p ablation dry intact no bleeding or hematoma noted at this time. Pt and spouse given discharge instructions and medication review, copies given to pt

## 2018-11-29 NOTE — DISCHARGE NOTE ADULT - PLAN OF CARE
No more atrial fibrillation Maintain a heart healthy, soft diet for one month to protect your esophagus. Take Protonix to protect your esophagus. Notify Dr. Murray if you have fever, malaise, trouble swallowing, bloody vomit or black stools. Continue your normal daily activities and to continue to walk as much as possible; with periods of rest when necessary.  Do not perform any strenuous activity or heavy lifting until cleared by Dr. Murray. There is no bathing in a bathtub, swimming, or submerging you in water until cleared by Dr. Murray. You have incisions that need to heal and bathing/swimming can expose it to bacteria.  No creams to your incisions. You may remove your dressings when you get home. You may shower. Allow soap and water to run over your incision and pat area dry. Ensure that your groin incisions remain dry at all times to prevent risk of infection. Follow up with Dr. Murray. Make an appointment within two weeks. Follow up with your primary cardiologist as well.  You are restarting Eliquis as a blood thinner.  If you experience any signs of bleeding such as bleeding gums, bloody sputum, bleeding in your stool or black stool inform your cardiologist; your eliquis may need to be stopped. If you experience any signs of atrial fibrillation such as dizziness, fatigue, palpitations, or fainting please inform Dr. Murray as soon as possible or go to your nearest emergency room. Will remain in sinus rhythm Maintain a heart healthy, soft diet for one month to protect your esophagus. Take Protonix to protect your esophagus. Notify Dr. Murray if you have fever, malaise, trouble swallowing, bloody vomit or black stools. Continue your normal daily activities and to continue to walk as much as possible; with periods of rest when necessary.  Do not perform any strenuous activity or heavy lifting until cleared by Dr. Murray. There is no bathing in a bathtub, swimming, or submerging you in water until cleared by Dr. Murray. You have incisions that need to heal and bathing/swimming can expose it to bacteria.  No creams to your incisions. You may remove your dressings when you get home. You may shower. Allow soap and water to run over your incision and pat area dry. Ensure that your groin incisions remain dry at all times to prevent risk of infection. Follow up with Dr. Murray; an appointment is scheduled for December 20th at 11:40 am. Follow up with your primary cardiologist - Dr Shipley an appointment is scheduled for December 13th @ 9:30am.  You are restarting Eliquis as a blood thinner.  If you experience any signs of bleeding such as bleeding gums, bloody sputum, bleeding in your stool or black stool inform your cardiologist; your eliquis may need to be stopped. If you experience any signs of atrial fibrillation such as dizziness, fatigue, palpitations, or fainting please inform Dr. Murray as soon as possible or go to your nearest emergency room. It is important to continue your normal daily activities and to continue to walk as much as possible; with periods of rest when necessary.  Do not perform any strenuous activity or heavy lifting until cleared by Dr. Murray. Your heart muscle is currently recovering and you should not be exerting it.  In addition there is no bathing in a bathtub, swimming, or submerging you in water until cleared by Dr. Murray. You have incisions that need to heal and bathing/swimming can expose it to bacteria.  No creams to your incisions. You may remove your dressings when you get home. You may shower. Allow soap and water to run over your incision and pat area dry. Ensure that your groin incisions remain dry at all times to prevent risk of infection.  Follow up with Dr. Murray an appointment is scheduled for December 20th at 11:40am. . Follow up with your primary cardiologist - Dr Shipley - an appointment is scheduled for Dec 13tth at 9:30 am.   You are restarting Eliquis. Eliquis is a blood thinner and therefore you are at higher risk of bleeding. If you experience any signs of atrial fibrillation such as dizziness, fatigue, palpitations, or fainting please inform Dr. Murray as soon as possible or go to your nearest emergency room.  If you experience any signs of bleeding such as bleeding gums, bloody sputum, bleeding in your stool or black stool inform your primary care doctor.

## 2018-11-29 NOTE — DISCHARGE NOTE ADULT - INSTRUCTIONS
DASH/TLC, mechanical soft diet x30 days. Heart healthy - low fat no added salt diet, and mechanical soft  x30 days. Heart healthy - low fat no added salt diet, and mechanical soft  x30 days.  You should continue to maintain a heart healthy diet (low cholesterol, low sodium, and low in saturated fats).You should also continue to eat a soft diet. A soft diet means food that is pureed in consistency, like mash potatoes, yogurts, soft cooked vegetables, soups. You should maintain this diet for one month. This will prevent any irritation to your esophagus which may be weakened due to the procedure. You also should take your protonix medications as prescribed, for one month to prevent any acid from your stomach. This will prevent further irritation by reducing the acid content in your stomach.

## 2018-11-29 NOTE — DISCHARGE NOTE ADULT - CARE PLAN
Goal:	No more atrial fibrillation  Assessment and plan of treatment:	Maintain a heart healthy, soft diet for one month to protect your esophagus. Take Protonix to protect your esophagus. Notify Dr. Murray if you have fever, malaise, trouble swallowing, bloody vomit or black stools. Continue your normal daily activities and to continue to walk as much as possible; with periods of rest when necessary.  Do not perform any strenuous activity or heavy lifting until cleared by Dr. Murray. There is no bathing in a bathtub, swimming, or submerging you in water until cleared by Dr. Murray. You have incisions that need to heal and bathing/swimming can expose it to bacteria.  No creams to your incisions. You may remove your dressings when you get home. You may shower. Allow soap and water to run over your incision and pat area dry. Ensure that your groin incisions remain dry at all times to prevent risk of infection. Follow up with Dr. Murray. Make an appointment within two weeks. Follow up with your primary cardiologist as well.  You are restarting Eliquis as a blood thinner.  If you experience any signs of bleeding such as bleeding gums, bloody sputum, bleeding in your stool or black stool inform your cardiologist; your eliquis may need to be stopped. If you experience any signs of atrial fibrillation such as dizziness, fatigue, palpitations, or fainting please inform Dr. Murray as soon as possible or go to your nearest emergency room. Principal Discharge DX:	Afib  Goal:	Will remain in sinus rhythm  Assessment and plan of treatment:	Maintain a heart healthy, soft diet for one month to protect your esophagus. Take Protonix to protect your esophagus. Notify Dr. Murray if you have fever, malaise, trouble swallowing, bloody vomit or black stools. Continue your normal daily activities and to continue to walk as much as possible; with periods of rest when necessary.  Do not perform any strenuous activity or heavy lifting until cleared by Dr. Murray. There is no bathing in a bathtub, swimming, or submerging you in water until cleared by Dr. Murray. You have incisions that need to heal and bathing/swimming can expose it to bacteria.  No creams to your incisions. You may remove your dressings when you get home. You may shower. Allow soap and water to run over your incision and pat area dry. Ensure that your groin incisions remain dry at all times to prevent risk of infection. Follow up with Dr. Murray; an appointment is scheduled for December 20th at 11:40 am. Follow up with your primary cardiologist - Dr Shipley an appointment is scheduled for December 13th @ 9:30am.  You are restarting Eliquis as a blood thinner.  If you experience any signs of bleeding such as bleeding gums, bloody sputum, bleeding in your stool or black stool inform your cardiologist; your eliquis may need to be stopped. If you experience any signs of atrial fibrillation such as dizziness, fatigue, palpitations, or fainting please inform Dr. Murray as soon as possible or go to your nearest emergency room. Principal Discharge DX:	Afib  Goal:	Will remain in sinus rhythm  Assessment and plan of treatment:	It is important to continue your normal daily activities and to continue to walk as much as possible; with periods of rest when necessary.  Do not perform any strenuous activity or heavy lifting until cleared by Dr. Murray. Your heart muscle is currently recovering and you should not be exerting it.  In addition there is no bathing in a bathtub, swimming, or submerging you in water until cleared by Dr. Murray. You have incisions that need to heal and bathing/swimming can expose it to bacteria.  No creams to your incisions. You may remove your dressings when you get home. You may shower. Allow soap and water to run over your incision and pat area dry. Ensure that your groin incisions remain dry at all times to prevent risk of infection.  Follow up with Dr. Murray an appointment is scheduled for December 20th at 11:40am. . Follow up with your primary cardiologist - Dr Shipley - an appointment is scheduled for Dec 13tth at 9:30 am.   You are restarting Eliquis. Eliquis is a blood thinner and therefore you are at higher risk of bleeding. If you experience any signs of atrial fibrillation such as dizziness, fatigue, palpitations, or fainting please inform Dr. Murray as soon as possible or go to your nearest emergency room.  If you experience any signs of bleeding such as bleeding gums, bloody sputum, bleeding in your stool or black stool inform your primary care doctor.

## 2018-11-29 NOTE — PROGRESS NOTE ADULT - SUBJECTIVE AND OBJECTIVE BOX
24H hour events: Patient is without complaints. Tele unremarkable    MEDICATIONS:  apixaban 5 milliGRAM(s) Oral every 12 hours  metoprolol tartrate 25 milliGRAM(s) Oral two times a day  acyclovir   Oral Tab/Cap 400 milliGRAM(s) Oral two times a day  cyclobenzaprine 5 milliGRAM(s) Oral three times a day PRN  pantoprazole    Tablet 40 milliGRAM(s) Oral before breakfast      REVIEW OF SYSTEMS:  See HPI, otherwise ROS negative.    PHYSICAL EXAM:  T(C): 36.8 (11-29-18 @ 09:00), Max: 37.1 (11-28-18 @ 20:00)  HR: 104 (11-29-18 @ 10:00) (87 - 118)  BP: 97/55 (11-29-18 @ 10:00) (82/48 - 125/68)  RR: 18 (11-29-18 @ 10:00) (12 - 22)  SpO2: 97% (11-29-18 @ 10:00) (96% - 98%)  Wt(kg): --  I&O's Summary    28 Nov 2018 07:01  -  29 Nov 2018 07:00  --------------------------------------------------------  IN: 240 mL / OUT: 1900 mL / NET: -1660 mL    29 Nov 2018 07:01  -  29 Nov 2018 11:13  --------------------------------------------------------  IN: 240 mL / OUT: 0 mL / NET: 240 mL        Appearance: Alert. NAD	  Cardiovascular: +S1S2 RRR no m/g/r  Respiratory: CTA B/L	  Psychiatry: A & O x 3, Mood & affect appropriate  Gastrointestinal:  Soft, NT. ND. +BS	  Neurologic: Non-focal  Extremities: No edema BLE; No groin hematoma or bruit  Vascular: Peripheral pulses palpable 2+ bilaterally; 2+ fem pulses      LABS:	 	    CBC Full  -  ( 29 Nov 2018 03:48 )  WBC Count : 9.7 K/uL  Hemoglobin : 12.6 g/dL  Hematocrit : 37.6 %  Platelet Count - Automated : 179 K/uL  Mean Cell Volume : 86.7 fl  Mean Cell Hemoglobin : 29.0 pg  Mean Cell Hemoglobin Concentration : 33.4 gm/dL  Auto Neutrophil # : 8.1 K/uL  Auto Lymphocyte # : 1.0 K/uL  Auto Monocyte # : 0.7 K/uL  Auto Eosinophil # : 0.0 K/uL  Auto Basophil # : 0.0 K/uL  Auto Neutrophil % : 82.9 %  Auto Lymphocyte % : 10.0 %  Auto Monocyte % : 6.8 %  Auto Eosinophil % : 0.1 %  Auto Basophil % : 0.1 %    11-29    141  |  106  |  12  ----------------------------<  102<H>  4.1   |  22  |  0.64  11-28    144  |  109<H>  |  10  ----------------------------<  160<H>  3.5   |  21<L>  |  0.72    Ca    8.8      29 Nov 2018 03:48  Ca    8.6      28 Nov 2018 15:14  Phos  3.4     11-29  Phos  3.2     11-28  Mg     2.0     11-29  Mg     1.9     11-28    TPro  6.1  /  Alb  3.7  /  TBili  0.3  /  DBili  x   /  AST  31  /  ALT  16  /  AlkPhos  83  11-29  TPro  6.6  /  Alb  4.0  /  TBili  0.4  /  DBili  x   /  AST  33  /  ALT  13  /  AlkPhos  91  11-28      TELEMETRY: SR 's bpm  	    ECG: SR; no acute ST-T changes 	    	  ASSESSMENT/PLAN: 	  68y/o female h/o symptomatic PAF s/p AF ablation on 11/29 remains in SR with no significant arrhythmias overnight  --Soft diet x 1 month  --PPI x 1 month  --Continue Eliquis  --Continue metoprolol  --Appreciate cards input. Echo done --F/U results  --F/U with Dr. Murray on 12/20 @11:40am  --If Echo ok, D/C home today    Tali Hodges PA-C  [id number]

## 2018-11-29 NOTE — DISCHARGE NOTE ADULT - NS AS ACTIVITY OBS
Walking-Outdoors allowed/Stairs allowed/Walking-Indoors allowed/No Heavy lifting/straining/Return to Work/School allowed/Showering allowed/Driving allowed Showering allowed/Walking-Indoors allowed/Walking-Outdoors allowed/Stairs allowed/No Heavy lifting/straining/Return to Work/School allowed

## 2018-11-29 NOTE — DISCHARGE NOTE ADULT - PATIENT PORTAL LINK FT
You can access the [facility] Patient Portal, offered by [facility], by registering with the following website: [email or web address]
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
## 2018-11-29 NOTE — PROGRESS NOTE ADULT - PROBLEM SELECTOR PLAN 1
s/p a fib ablation  c/w Metoprolol & Eliquis  c/w Protonix & mechanical soft diet  Diuresed negative 1.6 liters post procedure; gentle po hydration  Tele Sinus rhythm 90s to   TTE done this am - pending results plan for discharge  f/u EP recs

## 2018-11-29 NOTE — PROGRESS NOTE ADULT - ATTENDING COMMENTS
Patient is seen and examined with fellow, NP and the CCU house-staff. I agree with the history, physical and the assessment and plan.  s/p Afib ablation in NSR  on oral AC  continue with current medical therapy

## 2018-11-29 NOTE — PROGRESS NOTE ADULT - SUBJECTIVE AND OBJECTIVE BOX
Admission date:    CHIEF COMPLAINT:  HPI: 66 yo  female with PMH significant for infrequent idiopathic ventricular tachycardia, paroxysmal atrial fibrillation, left breast CA s/p surgery (10/2016 lumpectomy and RTX) who presents for PST for a-fib ablation tomorrow. Pt reports she had light headed and palpitation in 2018 converted with IV Cardizem in hospital and had f/u visit with Dr. Shipley. Pt had another episode of a-fib ion 2018, was in ER converted on its own. Pt currently denies chest pain, palpitation or SOB.     INTERVAL HISTORY: s/p A fib ablation  Sitting up in chair     REVIEW OF SYSTEMS: Denies ; all others negative    MEDICATIONS  (STANDING):  acyclovir   Oral Tab/Cap 400 milliGRAM(s) Oral two times a day  apixaban 5 milliGRAM(s) Oral every 12 hours  metoprolol tartrate 25 milliGRAM(s) Oral two times a day  pantoprazole    Tablet 40 milliGRAM(s) Oral before breakfast    MEDICATIONS  (PRN):  cyclobenzaprine 5 milliGRAM(s) Oral three times a day PRN Muscle Spasm      Objective:  ICU Vital Signs Last 24 Hrs  T(C): 36.4 (2018 04:00), Max: 37.1 (2018 20:00)  T(F): 97.6 (2018 04:00), Max: 98.7 (2018 20:00)  HR: 87 (2018 07:00) (87 - 118)  BP: 86/65 (2018 07:00) (86/65 - 125/68)  BP(mean): 73 (2018 07:00) (66 - 95)  ABP: --  ABP(mean): --  RR: 18 (2018 07:00) (12 - 22)  SpO2: 98% (2018 21:00) (96% - 98%)           @ 07:01  -   @ 07:00  --------------------------------------------------------  IN: 240 mL / OUT: 1900 mL / NET: -1660 mL      Daily Height in cm: 172.72 (2018 13:36)    Daily Weight in k.1 (2018 01:00)    PHYSICAL EXAM:  Constitutional:  HEENT:  Respiratory:  Cardiovascular:  Access site:   Gastrointestinal:  Genitourinary:  Extremities:  Vascular:  Neurological:  Skin:      TELEMETRY:       Labs:                          12.6   9.7   )-----------( 179      ( 2018 03:48 )             37.6         141  |  106  |  12  ----------------------------<  102<H>  4.1   |  22  |  0.64    Ca    8.8      2018 03:48  Phos  3.4       Mg     2.0         TPro  6.1  /  Alb  3.7  /  TBili  0.3  /  DBili  x   /  AST  31  /  ALT  16  /  AlkPhos  83      LIVER FUNCTIONS - ( 2018 03:48 )  Alb: 3.7 g/dL / Pro: 6.1 g/dL / ALK PHOS: 83 U/L / ALT: 16 U/L / AST: 31 U/L / GGT: x           PT/INR - ( 2018 03:48 )   PT: 13.5 sec;   INR: 1.17 ratio         PTT - ( 2018 03:48 )  PTT:28.0 sec        HEALTH ISSUES - PROBLEM Dx: Admission date:    CHIEF COMPLAINT:  HPI: 68 yo  female with PMH significant for infrequent idiopathic ventricular tachycardia, paroxysmal atrial fibrillation, left breast CA s/p surgery (10/2016 lumpectomy and RTX) who presents for PST for a-fib ablation tomorrow. Pt reports she had light headed and palpitation in 2018 converted with IV Cardizem in hospital and had f/u visit with Dr. Shipley. Pt had another episode of a-fib ion 2018, was in ER converted on its own. Pt currently denies chest pain, palpitation or SOB.     INTERVAL HISTORY: s/p A fib ablation  Sitting up in chair, no complaints offered    REVIEW OF SYSTEMS: Denies chest pain, palpitations, dizziness, lightheadedness, NV; all others negative    MEDICATIONS  (STANDING):  acyclovir   Oral Tab/Cap 400 milliGRAM(s) Oral two times a day  apixaban 5 milliGRAM(s) Oral every 12 hours  metoprolol tartrate 25 milliGRAM(s) Oral two times a day  pantoprazole    Tablet 40 milliGRAM(s) Oral before breakfast    MEDICATIONS  (PRN):  cyclobenzaprine 5 milliGRAM(s) Oral three times a day PRN Muscle Spasm      Objective:  ICU Vital Signs Last 24 Hrs  T(C): 36.4 (2018 04:00), Max: 37.1 (2018 20:00)  T(F): 97.6 (2018 04:00), Max: 98.7 (2018 20:00)  HR: 87 (2018 07:00) (87 - 118)  BP: 86/65 (2018 07:00) (86/65 - 125/68)  BP(mean): 73 (2018 07:00) (66 - 95)  ABP: --  ABP(mean): --  RR: 18 (2018 07:00) (12 - 22)  SpO2: 98% (2018 21:00) (96% - 98%)           @ 07:01  -   @ 07:00  --------------------------------------------------------  IN: 240 mL / OUT: 1900 mL / NET: -1660 mL      Daily Height in cm: 172.72 (2018 13:36)    Daily Weight in k.1 (2018 01:00)    PHYSICAL EXAM:  Constitutional: NAD  HEENT: oral mucosa pink moist  Respiratory: Regular unlabored CTA  Cardiovascular: RRR S1 S2 no M/R/G, no LE edema  Access site: Right groin site ok no hematoma/ecchymosis  Gastrointestinal: soft ND/NT; + bowel sounds  Genitourinary: voiding on own  Extremities: SILVA equal strength  Vascular: warm peripherally   Neurological: A & O x3 mood & affect appropriate  Skin: warm dry intact no rash/cyanosis      TELEMETRY: ST       Labs:                          12.6   9.7   )-----------( 179      ( 2018 03:48 )             37.6         141  |  106  |  12  ----------------------------<  102<H>  4.1   |  22  |  0.64    Ca    8.8      2018 03:48  Phos  3.4       Mg     2.0         TPro  6.1  /  Alb  3.7  /  TBili  0.3  /  DBili  x   /  AST  31  /  ALT  16  /  AlkPhos  83      LIVER FUNCTIONS - ( 2018 03:48 )  Alb: 3.7 g/dL / Pro: 6.1 g/dL / ALK PHOS: 83 U/L / ALT: 16 U/L / AST: 31 U/L / GGT: x           PT/INR - ( 2018 03:48 )   PT: 13.5 sec;   INR: 1.17 ratio         PTT - ( 2018 03:48 )  PTT:28.0 sec        HEALTH ISSUES - PROBLEM Dx:

## 2018-11-29 NOTE — DISCHARGE NOTE ADULT - HOSPITAL COURSE
66 y/o  female with PMH significant for infrequent idiopathic ventricular tachycardia, paroxysmal atrial fibrillation, left breast CA s/p surgery (10/2016 lumpectomy and RTX) who presented for scheduled a-fib ablation. Pt reports she had light headed and palpitation in July 12/2018 converted with IV Cardizem in hospital and had f/u visit with Dr. Shipley. Pt had another episode of a-fib ion 12/2018, was in ER converted on its own. Pt now s/p a-fib ablation, currently SR. 68 y/o  female with PMH significant for infrequent idiopathic ventricular tachycardia, paroxysmal atrial fibrillation, left breast CA s/p surgery (10/2016 lumpectomy and RTX) who presented for scheduled a-fib ablation. Pt reports she had light headed and palpitation in July 12/2018 converted with IV Cardizem in hospital and had f/u visit with Dr. Shipley. Pt had another episode of a-fib ion 12/2018, was in ER converted on its own. Pt now s/p a-fib ablation, currently SR.  She was monitored in CCU2 following procedure remained stable in sinus rhythm - sinus tachy cardia.  A TTE was done which showed  Right groin access site was stable, ambulated without difficulty.  She was discharged home in stable condition. 68 y/o  female with PMH significant for infrequent idiopathic ventricular tachycardia, paroxysmal atrial fibrillation, left breast CA s/p surgery (10/2016 lumpectomy and RTX) who presented for scheduled a-fib ablation. Pt reports she had light headed and palpitation in July 12/2018 converted with IV Cardizem in hospital and had f/u visit with Dr. Shipley. Pt had another episode of a-fib ion 12/2018, was in ER converted on its own. Pt now s/p a-fib ablation, currently SR.  She was monitored in CCU2 following procedure remained stable in sinus rhythm - sinus tachy cardia.  A TTE was done which showed trace pericardial effusion.  Right groin access site was stable, ambulated without difficulty.  She was discharged home in stable condition.

## 2018-11-29 NOTE — DISCHARGE NOTE ADULT - ADDITIONAL INSTRUCTIONS
Call Dr. Murray's office for follow up appointment in 1 month. Scheduled appointment with Dr Shipley on Dec 13th at 9:30 am  Scheduled follow up appointment with MATILDA on December 20th at 11:40am

## 2018-11-29 NOTE — DISCHARGE NOTE ADULT - MEDICATION SUMMARY - MEDICATIONS TO TAKE
I will START or STAY ON the medications listed below when I get home from the hospital:    apixaban 5 mg oral tablet  -- 1 tab(s) by mouth every 12 hours  -- Indication: For Afib    acyclovir 400 mg oral tablet  -- 1 tab(s) by mouth 2 times a day  -- Indication: For Anti viral    metoprolol tartrate 25 mg oral tablet  -- 1 tab(s) by mouth 2 times a day  -- Indication: For Afib    cyclobenzaprine 5 mg oral tablet  -- 1 tab(s) by mouth 3 times a day, As Needed  -- Indication: For muscle relaxant    pantoprazole 40 mg oral delayed release tablet  -- 1 tab(s) by mouth once a day (before a meal)  -- Indication: For s/p afib ablation

## 2018-11-30 ENCOUNTER — INBOUND DOCUMENT (OUTPATIENT)
Age: 68
End: 2018-11-30

## 2018-12-01 ENCOUNTER — INPATIENT (INPATIENT)
Facility: HOSPITAL | Age: 68
LOS: 0 days | Discharge: HOME | DRG: 310 | End: 2018-12-02
Attending: HOSPITALIST | Admitting: HOSPITALIST
Payer: COMMERCIAL

## 2018-12-01 VITALS
HEIGHT: 68 IN | WEIGHT: 143.08 LBS | OXYGEN SATURATION: 99 % | TEMPERATURE: 98 F | SYSTOLIC BLOOD PRESSURE: 113 MMHG | HEART RATE: 117 BPM | DIASTOLIC BLOOD PRESSURE: 81 MMHG | RESPIRATION RATE: 18 BRPM

## 2018-12-01 DIAGNOSIS — C50.919 MALIGNANT NEOPLASM OF UNSPECIFIED SITE OF UNSPECIFIED FEMALE BREAST: ICD-10-CM

## 2018-12-01 DIAGNOSIS — R00.0 TACHYCARDIA, UNSPECIFIED: ICD-10-CM

## 2018-12-01 DIAGNOSIS — A60.00 HERPESVIRAL INFECTION OF UROGENITAL SYSTEM, UNSPECIFIED: ICD-10-CM

## 2018-12-01 DIAGNOSIS — K21.9 GASTRO-ESOPHAGEAL REFLUX DISEASE WITHOUT ESOPHAGITIS: ICD-10-CM

## 2018-12-01 DIAGNOSIS — Z90.721 ACQUIRED ABSENCE OF OVARIES, UNILATERAL: Chronic | ICD-10-CM

## 2018-12-01 DIAGNOSIS — Z98.890 OTHER SPECIFIED POSTPROCEDURAL STATES: Chronic | ICD-10-CM

## 2018-12-01 DIAGNOSIS — I48.91 UNSPECIFIED ATRIAL FIBRILLATION: ICD-10-CM

## 2018-12-01 LAB
ALBUMIN SERPL ELPH-MCNC: 4.7 G/DL — SIGNIFICANT CHANGE UP (ref 3.3–5)
ALP SERPL-CCNC: 100 U/L — SIGNIFICANT CHANGE UP (ref 40–120)
ALT FLD-CCNC: 33 U/L — SIGNIFICANT CHANGE UP (ref 10–45)
ANION GAP SERPL CALC-SCNC: 13 MMOL/L — SIGNIFICANT CHANGE UP (ref 5–17)
APTT BLD: 29.8 SEC — SIGNIFICANT CHANGE UP (ref 27.5–36.3)
AST SERPL-CCNC: 46 U/L — HIGH (ref 10–40)
BASOPHILS # BLD AUTO: 0.1 K/UL — SIGNIFICANT CHANGE UP (ref 0–0.2)
BASOPHILS NFR BLD AUTO: 0.9 % — SIGNIFICANT CHANGE UP (ref 0–2)
BILIRUB SERPL-MCNC: 0.4 MG/DL — SIGNIFICANT CHANGE UP (ref 0.2–1.2)
BUN SERPL-MCNC: 11 MG/DL — SIGNIFICANT CHANGE UP (ref 7–23)
CALCIUM SERPL-MCNC: 9.9 MG/DL — SIGNIFICANT CHANGE UP (ref 8.4–10.5)
CHLORIDE SERPL-SCNC: 101 MMOL/L — SIGNIFICANT CHANGE UP (ref 96–108)
CK MB BLD-MCNC: 2.6 % — SIGNIFICANT CHANGE UP (ref 0–3.5)
CK MB CFR SERPL CALC: 2.9 NG/ML — SIGNIFICANT CHANGE UP (ref 0–3.8)
CK SERPL-CCNC: 110 U/L — SIGNIFICANT CHANGE UP (ref 25–170)
CO2 SERPL-SCNC: 28 MMOL/L — SIGNIFICANT CHANGE UP (ref 22–31)
CREAT SERPL-MCNC: 0.69 MG/DL — SIGNIFICANT CHANGE UP (ref 0.5–1.3)
EOSINOPHIL # BLD AUTO: 0.1 K/UL — SIGNIFICANT CHANGE UP (ref 0–0.5)
EOSINOPHIL NFR BLD AUTO: 1.8 % — SIGNIFICANT CHANGE UP (ref 0–6)
GLUCOSE SERPL-MCNC: 92 MG/DL — SIGNIFICANT CHANGE UP (ref 70–99)
HCT VFR BLD CALC: 44.2 % — SIGNIFICANT CHANGE UP (ref 34.5–45)
HGB BLD-MCNC: 14.7 G/DL — SIGNIFICANT CHANGE UP (ref 11.5–15.5)
INR BLD: 1.08 RATIO — SIGNIFICANT CHANGE UP (ref 0.88–1.16)
LYMPHOCYTES # BLD AUTO: 1.4 K/UL — SIGNIFICANT CHANGE UP (ref 1–3.3)
LYMPHOCYTES # BLD AUTO: 21.6 % — SIGNIFICANT CHANGE UP (ref 13–44)
MAGNESIUM SERPL-MCNC: 2.2 MG/DL — SIGNIFICANT CHANGE UP (ref 1.6–2.6)
MCHC RBC-ENTMCNC: 29 PG — SIGNIFICANT CHANGE UP (ref 27–34)
MCHC RBC-ENTMCNC: 33.4 GM/DL — SIGNIFICANT CHANGE UP (ref 32–36)
MCV RBC AUTO: 86.8 FL — SIGNIFICANT CHANGE UP (ref 80–100)
MONOCYTES # BLD AUTO: 0.4 K/UL — SIGNIFICANT CHANGE UP (ref 0–0.9)
MONOCYTES NFR BLD AUTO: 6.9 % — SIGNIFICANT CHANGE UP (ref 2–14)
NEUTROPHILS # BLD AUTO: 4.4 K/UL — SIGNIFICANT CHANGE UP (ref 1.8–7.4)
NEUTROPHILS NFR BLD AUTO: 68.8 % — SIGNIFICANT CHANGE UP (ref 43–77)
NT-PROBNP SERPL-SCNC: 319 PG/ML — HIGH (ref 0–300)
PHOSPHATE SERPL-MCNC: 4.3 MG/DL — SIGNIFICANT CHANGE UP (ref 2.5–4.5)
PLATELET # BLD AUTO: 187 K/UL — SIGNIFICANT CHANGE UP (ref 150–400)
POTASSIUM SERPL-MCNC: 4.1 MMOL/L — SIGNIFICANT CHANGE UP (ref 3.5–5.3)
POTASSIUM SERPL-SCNC: 4.1 MMOL/L — SIGNIFICANT CHANGE UP (ref 3.5–5.3)
PROT SERPL-MCNC: 8 G/DL — SIGNIFICANT CHANGE UP (ref 6–8.3)
PROTHROM AB SERPL-ACNC: 12.4 SEC — SIGNIFICANT CHANGE UP (ref 10–12.9)
RBC # BLD: 5.09 M/UL — SIGNIFICANT CHANGE UP (ref 3.8–5.2)
RBC # FLD: 12.9 % — SIGNIFICANT CHANGE UP (ref 10.3–14.5)
SODIUM SERPL-SCNC: 142 MMOL/L — SIGNIFICANT CHANGE UP (ref 135–145)
TROPONIN T, HIGH SENSITIVITY RESULT: 128 NG/L — HIGH (ref 0–51)
TROPONIN T, HIGH SENSITIVITY RESULT: 144 NG/L — HIGH (ref 0–51)
WBC # BLD: 6.3 K/UL — SIGNIFICANT CHANGE UP (ref 3.8–10.5)
WBC # FLD AUTO: 6.3 K/UL — SIGNIFICANT CHANGE UP (ref 3.8–10.5)

## 2018-12-01 PROCEDURE — 99223 1ST HOSP IP/OBS HIGH 75: CPT | Mod: AI

## 2018-12-01 PROCEDURE — 93308 TTE F-UP OR LMTD: CPT | Mod: 26

## 2018-12-01 PROCEDURE — 99284 EMERGENCY DEPT VISIT MOD MDM: CPT | Mod: GC

## 2018-12-01 RX ORDER — PANTOPRAZOLE SODIUM 20 MG/1
40 TABLET, DELAYED RELEASE ORAL
Qty: 0 | Refills: 0 | Status: DISCONTINUED | OUTPATIENT
Start: 2018-12-01 | End: 2018-12-02

## 2018-12-01 RX ORDER — APIXABAN 2.5 MG/1
5 TABLET, FILM COATED ORAL ONCE
Qty: 0 | Refills: 0 | Status: COMPLETED | OUTPATIENT
Start: 2018-12-01 | End: 2018-12-01

## 2018-12-01 RX ORDER — APIXABAN 2.5 MG/1
5 TABLET, FILM COATED ORAL EVERY 12 HOURS
Qty: 0 | Refills: 0 | Status: DISCONTINUED | OUTPATIENT
Start: 2018-12-01 | End: 2018-12-02

## 2018-12-01 RX ORDER — CYCLOBENZAPRINE HYDROCHLORIDE 10 MG/1
5 TABLET, FILM COATED ORAL THREE TIMES A DAY
Qty: 0 | Refills: 0 | Status: DISCONTINUED | OUTPATIENT
Start: 2018-12-01 | End: 2018-12-02

## 2018-12-01 RX ORDER — SODIUM CHLORIDE 9 MG/ML
1000 INJECTION INTRAMUSCULAR; INTRAVENOUS; SUBCUTANEOUS ONCE
Qty: 0 | Refills: 0 | Status: COMPLETED | OUTPATIENT
Start: 2018-12-01 | End: 2018-12-01

## 2018-12-01 RX ORDER — ACYCLOVIR SODIUM 500 MG
400 VIAL (EA) INTRAVENOUS ONCE
Qty: 0 | Refills: 0 | Status: COMPLETED | OUTPATIENT
Start: 2018-12-01 | End: 2018-12-02

## 2018-12-01 RX ORDER — SODIUM CHLORIDE 9 MG/ML
1000 INJECTION INTRAMUSCULAR; INTRAVENOUS; SUBCUTANEOUS
Qty: 0 | Refills: 0 | Status: COMPLETED | OUTPATIENT
Start: 2018-12-01 | End: 2018-12-02

## 2018-12-01 RX ORDER — ACYCLOVIR SODIUM 500 MG
400 VIAL (EA) INTRAVENOUS
Qty: 0 | Refills: 0 | Status: DISCONTINUED | OUTPATIENT
Start: 2018-12-01 | End: 2018-12-02

## 2018-12-01 RX ORDER — METOPROLOL TARTRATE 50 MG
25 TABLET ORAL
Qty: 0 | Refills: 0 | Status: DISCONTINUED | OUTPATIENT
Start: 2018-12-01 | End: 2018-12-02

## 2018-12-01 RX ORDER — METOPROLOL TARTRATE 50 MG
25 TABLET ORAL ONCE
Qty: 0 | Refills: 0 | Status: COMPLETED | OUTPATIENT
Start: 2018-12-01 | End: 2018-12-01

## 2018-12-01 RX ADMIN — APIXABAN 5 MILLIGRAM(S): 2.5 TABLET, FILM COATED ORAL at 23:09

## 2018-12-01 RX ADMIN — SODIUM CHLORIDE 2000 MILLILITER(S): 9 INJECTION INTRAMUSCULAR; INTRAVENOUS; SUBCUTANEOUS at 17:50

## 2018-12-01 RX ADMIN — SODIUM CHLORIDE 1000 MILLILITER(S): 9 INJECTION INTRAMUSCULAR; INTRAVENOUS; SUBCUTANEOUS at 20:00

## 2018-12-01 RX ADMIN — Medication 25 MILLIGRAM(S): at 18:49

## 2018-12-01 NOTE — ED PROVIDER NOTE - OBJECTIVE STATEMENT
Attending Porsche Coffey: 66 y/o female with PMH  of infrequent idiopathic ventricular tachycardia, paroxysmal atrial fibrillation, left breast CA s/p surgery s/p recent ablation presenting with palpitations. pt states this am began having increased palpitations. took her normal metoprol this am. noticed her heart rate was faster and called her cardiologist dr mancilla. he instructed her to take another 25mg which she did. symptoms continued prompting her to come to the ed. is on apixamab and complaint. no fevers or cough. no sob. denies any chest pain Attending Porsche Coffey: 68 y/o female with PMH  of infrequent idiopathic ventricular tachycardia, paroxysmal atrial fibrillation, left breast CA s/p surgery s/p recent ablation presenting with palpitations. pt states this am began having increased palpitations. took her normal metoprol this am. noticed her heart rate was faster and called her cardiologist dr mancilla. he instructed her to take another 25mg which she did. symptoms continued prompting her to come to the ed. is on apixamab and complaint. no fevers or cough. no sob. denies any chest pain    Green: 67F w/ distant history of idiopathic vtach, recent paroxysmal afib, left breast ca s/p surgery and radiation that may have lead to her arrhythmias. Had ablation 2 days ago p/w 1 day of tachycardia. States her heart rate was 120s this mornign, took a metoprolol, which dropped it to mid teens. Reports mild dizziness, no change w/ position. Denies chest pain, SOB, headache, focal weakness or numbness.

## 2018-12-01 NOTE — ED PROVIDER NOTE - MEDICAL DECISION MAKING DETAILS
68F w/ history of tachycardias, including afib, will check labs for anemia or dehydration, if labs and other causes of tachycardia are negative, will call cardiology. No afib at the moment but will monitor 68F w/ history of tachycardias, including afib, will check labs for anemia or dehydration, if labs and other causes of tachycardia are negative, will call cardiology. No afib at the moment but will monitor  Attending Porsche Coffey: 68 y/o female h/o tachycardia s/p ablation on metroprolol presenting with tachycardia. h/h shows no evidence of anemia. pt had mild troponin leak likely secondary to recent ablation. ekg shows sinus tachycardia. groin site without bruit or sig swelling making pseudoaneurysm unlikely. will give IV hydration. d/w cards and re-eval. pt on blood thinners and no pleuritic pain making PE less likely

## 2018-12-01 NOTE — H&P ADULT - NSHPLABSRESULTS_GEN_ALL_CORE
nl cbc/coags  cmp wnl except ast 146  trop 144 (repeat pending)  11/29 TTE nl lv and rv, trace pericardial effusion  ekg sinus tach, no ischemia, qtc 453  10/17 cxr clear lungs
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
## 2018-12-01 NOTE — H&P ADULT - NSHPATTENDINGPLANDISCUSS_GEN_ALL_CORE
ED RN, patient, care to be assumed by day hospitalist ED RN, patient, night NP Brunilda, care to be assumed by day hospitalist

## 2018-12-01 NOTE — H&P ADULT - HISTORY OF PRESENT ILLNESS
68 f with AF, DJD c-spine, GERD, h/o breast cancer s/p lumpectomy/xrt, genital herpes on chronic suppressive antivirals, s/p ablation on 11/29 now a/w recurrent palpitations.  Pt states after ablation she felt well and went home.  Yesterday no symptoms.  Woke early this morning with palpitations which resolved spontaneously and she went back to sleep.  Later patient woke in morning and, while walking up stairs, felt palpitations return.  She tried to lay down and rest with no relief.  Called Dr. Su who advised an extra metoprolol which she tried but did not relieve symptoms.  Pt also felt mildly dyspneic but no cp/dizziness/n/v.    Pt states that she has no recent f/c, no uri symptoms.  + urinary frequency but attributes this to increased IVFs and PO fluids at home.  Low po intake of food 2nd dislike of diet (low chol, low salt soft 2nd esophageal irritation after ablation).  Pt states she feels mild heartburn after procedure but improved with protonix.    In ED on arrival + sinus tach 110s, improved with NS 1 L but then again hr to 150s improved with metoprolol 25mg po x 1.  Pt denies any active pain, no other new medications.  Had TTE 11/29 with nl lv and rv and trace pericardial effusion.  ED bedside US reportedly with unchanged trace pericardial effusion.

## 2018-12-01 NOTE — H&P ADULT - PROBLEM SELECTOR PLAN 1
given rhythm has been persistent sinus tach ? precipitating factor  pt with mild dyspnea, ck CXR r/o pulmonary process  pt with urinary frequency, ck UA r/o UTI  ck TSH and free T4 to r/o thyroid disease  no pain, not hypoxic, hydrated with NS in ED, TTE  unchanged  ? 2nd stress/inflammation/medication from ablation   monitor on tele  + initial troponin, d/w RN in ED to repeat STAT Kalpana for trend given rhythm has been persistent sinus tach ? precipitating factor  pt with mild dyspnea, ck CXR r/o pulmonary process  pt with urinary frequency, ck UA r/o UTI  ck TSH and free T4 to r/o thyroid disease  no pain, not hypoxic, hydrated with NS in ED, TTE  unchanged  ? 2nd stress/inflammation/medication from ablation   monitor on tele  + initial troponin (? 2nd procedure), d/w RN in ED to repeat STAT Kalpana for trend  ck official TTE

## 2018-12-01 NOTE — ED PROVIDER NOTE - NS ED ROS FT
General: denies fever, chills  HENT: denies URI symptoms   Eyes: denies visual changes  Neck: denies neck pain  CV: denies chest pain  Resp: denies difficulty breathing, + cough  Abdominal: denies nausea, vomiting, reports some frequent stools today diarrhea, denies abdominal pain  MSK: denies muscle aches  Neuro: denies headaches, numbness

## 2018-12-01 NOTE — ED PROVIDER NOTE - ATTENDING CONTRIBUTION TO CARE
Attending MD Porsche Coffey:  I personally have seen and examined this patient.  Resident note reviewed and agree on plan of care and except where noted.  See HPI, PE, and MDM for details.

## 2018-12-01 NOTE — ED ADULT NURSE NOTE - OBJECTIVE STATEMENT
68OYF PM GERD, breast cancer, IBS and idiopathic venticular tachycardia. Pt had ablation done on 11/28/18 discharged home. Pt states this morning she felt palpitations 7am this morning. She took metoprolol her regular dose but with no relief in palpitations.  Denies SOB, chest pain, N/V/D, HA, dizziness, fever, chills, abdominal pain. EKG done. Sinus tachycardia noted.  MD Coffey at bedside. No interventions for tachycardia at this time.  Spouse at bedside. Pt calm, in no acute distress. 68OYF PM GERD, breast cancer, IBS and idiopathic venticular tachycardia. Pt had ablation done on 11/28/18 discharged home. Pt states HR elevated to 110 upon discharge and throughout admission. Pt states this morning she felt palpitations 7am this morning. She took metoprolol her regular dose but with no relief in palpitations.  Denies SOB, chest pain, N/V/D, HA, dizziness, fever, chills, abdominal pain. EKG done. Sinus tachycardia noted.  MD Coffey at bedside. No interventions for tachycardia at this time.  Spouse at bedside. Pt calm, in no acute distress.

## 2018-12-01 NOTE — H&P ADULT - ALLERGY TYPES
gluten intolerant, rashs to bactrim and adhesives, liver damage with cipro and sob with valium/reactions to medicines/reactions to food

## 2018-12-01 NOTE — ED ADULT NURSE REASSESSMENT NOTE - NS ED NURSE REASSESS COMMENT FT1
Pt in no acute distress. Pt tachycardic, pt was told at the moment she will be admitted to the hospital and visibly upset. MD Green at bedside. No intervention at this time. Will continue to monitor. Spouse at bedside. Pt in no acute distress. Pt tachycardic, pt was told at the moment she will be admitted to the hospital and visibly upset. MD Green at bedside. EKG done again, sinus tachycardia noted. Will continue to monitor. Spouse at bedside.

## 2018-12-01 NOTE — ED PROVIDER NOTE - PROGRESS NOTE DETAILS
Attending Porsceh Coffey: paged dr mancilla Attending Porsche Coffey: d/w cards fellow will come to evaluate Green: cards stated initially patient was safe for discharge, now states she must stay. Patient in the 150s on monitor, repeating EKG, giving metoprolol Attending Porsche Coffey: HR improved to 110. d/w cards fellow recommend admission to tele and repeat comprehensive echo

## 2018-12-02 ENCOUNTER — TRANSCRIPTION ENCOUNTER (OUTPATIENT)
Age: 68
End: 2018-12-02

## 2018-12-02 VITALS
SYSTOLIC BLOOD PRESSURE: 111 MMHG | HEART RATE: 103 BPM | OXYGEN SATURATION: 99 % | TEMPERATURE: 98 F | RESPIRATION RATE: 18 BRPM | DIASTOLIC BLOOD PRESSURE: 78 MMHG

## 2018-12-02 LAB
ALBUMIN SERPL ELPH-MCNC: 3.9 G/DL — SIGNIFICANT CHANGE UP (ref 3.3–5)
ALP SERPL-CCNC: 83 U/L — SIGNIFICANT CHANGE UP (ref 40–120)
ALT FLD-CCNC: 28 U/L — SIGNIFICANT CHANGE UP (ref 10–45)
ANION GAP SERPL CALC-SCNC: 11 MMOL/L — SIGNIFICANT CHANGE UP (ref 5–17)
APPEARANCE UR: CLEAR — SIGNIFICANT CHANGE UP
AST SERPL-CCNC: 33 U/L — SIGNIFICANT CHANGE UP (ref 10–40)
BACTERIA # UR AUTO: NEGATIVE — SIGNIFICANT CHANGE UP
BASOPHILS # BLD AUTO: 0.01 K/UL — SIGNIFICANT CHANGE UP (ref 0–0.2)
BASOPHILS NFR BLD AUTO: 0.2 % — SIGNIFICANT CHANGE UP (ref 0–2)
BILIRUB SERPL-MCNC: 0.5 MG/DL — SIGNIFICANT CHANGE UP (ref 0.2–1.2)
BILIRUB UR-MCNC: NEGATIVE — SIGNIFICANT CHANGE UP
BUN SERPL-MCNC: 10 MG/DL — SIGNIFICANT CHANGE UP (ref 7–23)
CALCIUM SERPL-MCNC: 9.4 MG/DL — SIGNIFICANT CHANGE UP (ref 8.4–10.5)
CHLORIDE SERPL-SCNC: 105 MMOL/L — SIGNIFICANT CHANGE UP (ref 96–108)
CK MB CFR SERPL CALC: 2.3 NG/ML — SIGNIFICANT CHANGE UP (ref 0–3.8)
CK SERPL-CCNC: 87 U/L — SIGNIFICANT CHANGE UP (ref 25–170)
CO2 SERPL-SCNC: 27 MMOL/L — SIGNIFICANT CHANGE UP (ref 22–31)
COLOR SPEC: SIGNIFICANT CHANGE UP
CREAT SERPL-MCNC: 0.66 MG/DL — SIGNIFICANT CHANGE UP (ref 0.5–1.3)
DIFF PNL FLD: ABNORMAL
EOSINOPHIL # BLD AUTO: 0.07 K/UL — SIGNIFICANT CHANGE UP (ref 0–0.5)
EOSINOPHIL NFR BLD AUTO: 1.1 % — SIGNIFICANT CHANGE UP (ref 0–6)
EPI CELLS # UR: 1 /HPF — SIGNIFICANT CHANGE UP
GLUCOSE SERPL-MCNC: 88 MG/DL — SIGNIFICANT CHANGE UP (ref 70–99)
GLUCOSE UR QL: NEGATIVE — SIGNIFICANT CHANGE UP
HCT VFR BLD CALC: 41.3 % — SIGNIFICANT CHANGE UP (ref 34.5–45)
HGB BLD-MCNC: 13.1 G/DL — SIGNIFICANT CHANGE UP (ref 11.5–15.5)
HYALINE CASTS # UR AUTO: 0 /LPF — SIGNIFICANT CHANGE UP (ref 0–2)
IMM GRANULOCYTES NFR BLD AUTO: 0.2 % — SIGNIFICANT CHANGE UP (ref 0–1.5)
KETONES UR-MCNC: NEGATIVE — SIGNIFICANT CHANGE UP
LEUKOCYTE ESTERASE UR-ACNC: ABNORMAL
LYMPHOCYTES # BLD AUTO: 1.42 K/UL — SIGNIFICANT CHANGE UP (ref 1–3.3)
LYMPHOCYTES # BLD AUTO: 23 % — SIGNIFICANT CHANGE UP (ref 13–44)
MCHC RBC-ENTMCNC: 28.7 PG — SIGNIFICANT CHANGE UP (ref 27–34)
MCHC RBC-ENTMCNC: 31.7 GM/DL — LOW (ref 32–36)
MCV RBC AUTO: 90.4 FL — SIGNIFICANT CHANGE UP (ref 80–100)
MONOCYTES # BLD AUTO: 0.49 K/UL — SIGNIFICANT CHANGE UP (ref 0–0.9)
MONOCYTES NFR BLD AUTO: 7.9 % — SIGNIFICANT CHANGE UP (ref 2–14)
NEUTROPHILS # BLD AUTO: 4.17 K/UL — SIGNIFICANT CHANGE UP (ref 1.8–7.4)
NEUTROPHILS NFR BLD AUTO: 67.6 % — SIGNIFICANT CHANGE UP (ref 43–77)
NITRITE UR-MCNC: NEGATIVE — SIGNIFICANT CHANGE UP
PH UR: 6.5 — SIGNIFICANT CHANGE UP (ref 5–8)
PLATELET # BLD AUTO: 183 K/UL — SIGNIFICANT CHANGE UP (ref 150–400)
POTASSIUM SERPL-MCNC: 4.4 MMOL/L — SIGNIFICANT CHANGE UP (ref 3.5–5.3)
POTASSIUM SERPL-SCNC: 4.4 MMOL/L — SIGNIFICANT CHANGE UP (ref 3.5–5.3)
PROT SERPL-MCNC: 6.6 G/DL — SIGNIFICANT CHANGE UP (ref 6–8.3)
PROT UR-MCNC: NEGATIVE — SIGNIFICANT CHANGE UP
RBC # BLD: 4.57 M/UL — SIGNIFICANT CHANGE UP (ref 3.8–5.2)
RBC # FLD: 13.6 % — SIGNIFICANT CHANGE UP (ref 10.3–14.5)
RBC CASTS # UR COMP ASSIST: 3 /HPF — SIGNIFICANT CHANGE UP (ref 0–4)
SODIUM SERPL-SCNC: 143 MMOL/L — SIGNIFICANT CHANGE UP (ref 135–145)
SP GR SPEC: 1.01 — SIGNIFICANT CHANGE UP (ref 1.01–1.02)
T4 FREE SERPL-MCNC: 1.4 NG/DL — SIGNIFICANT CHANGE UP (ref 0.9–1.8)
TROPONIN T, HIGH SENSITIVITY RESULT: 114 NG/L — HIGH (ref 0–51)
TSH SERPL-MCNC: 1.47 UIU/ML — SIGNIFICANT CHANGE UP (ref 0.27–4.2)
UROBILINOGEN FLD QL: NEGATIVE — SIGNIFICANT CHANGE UP
WBC # BLD: 6.17 K/UL — SIGNIFICANT CHANGE UP (ref 3.8–10.5)
WBC # FLD AUTO: 6.17 K/UL — SIGNIFICANT CHANGE UP (ref 3.8–10.5)
WBC UR QL: 2 /HPF — SIGNIFICANT CHANGE UP (ref 0–5)

## 2018-12-02 PROCEDURE — 96361 HYDRATE IV INFUSION ADD-ON: CPT

## 2018-12-02 PROCEDURE — 84439 ASSAY OF FREE THYROXINE: CPT

## 2018-12-02 PROCEDURE — 93005 ELECTROCARDIOGRAM TRACING: CPT

## 2018-12-02 PROCEDURE — 81001 URINALYSIS AUTO W/SCOPE: CPT

## 2018-12-02 PROCEDURE — 85027 COMPLETE CBC AUTOMATED: CPT

## 2018-12-02 PROCEDURE — C1730: CPT

## 2018-12-02 PROCEDURE — 85610 PROTHROMBIN TIME: CPT

## 2018-12-02 PROCEDURE — 99239 HOSP IP/OBS DSCHRG MGMT >30: CPT

## 2018-12-02 PROCEDURE — 99285 EMERGENCY DEPT VISIT HI MDM: CPT | Mod: 25

## 2018-12-02 PROCEDURE — C1766: CPT

## 2018-12-02 PROCEDURE — 93308 TTE F-UP OR LMTD: CPT

## 2018-12-02 PROCEDURE — C1894: CPT

## 2018-12-02 PROCEDURE — 83735 ASSAY OF MAGNESIUM: CPT

## 2018-12-02 PROCEDURE — 83880 ASSAY OF NATRIURETIC PEPTIDE: CPT

## 2018-12-02 PROCEDURE — 99223 1ST HOSP IP/OBS HIGH 75: CPT

## 2018-12-02 PROCEDURE — 82550 ASSAY OF CK (CPK): CPT

## 2018-12-02 PROCEDURE — C1893: CPT

## 2018-12-02 PROCEDURE — 93623 PRGRMD STIMJ&PACG IV RX NFS: CPT

## 2018-12-02 PROCEDURE — 84100 ASSAY OF PHOSPHORUS: CPT

## 2018-12-02 PROCEDURE — 84484 ASSAY OF TROPONIN QUANT: CPT

## 2018-12-02 PROCEDURE — 80053 COMPREHEN METABOLIC PANEL: CPT

## 2018-12-02 PROCEDURE — 85730 THROMBOPLASTIN TIME PARTIAL: CPT

## 2018-12-02 PROCEDURE — 93656 COMPRE EP EVAL ABLTJ ATR FIB: CPT

## 2018-12-02 PROCEDURE — 93662 INTRACARDIAC ECG (ICE): CPT

## 2018-12-02 PROCEDURE — C8929: CPT

## 2018-12-02 PROCEDURE — 84443 ASSAY THYROID STIM HORMONE: CPT

## 2018-12-02 PROCEDURE — 96360 HYDRATION IV INFUSION INIT: CPT

## 2018-12-02 PROCEDURE — 82553 CREATINE MB FRACTION: CPT

## 2018-12-02 PROCEDURE — C1731: CPT

## 2018-12-02 PROCEDURE — C1732: CPT

## 2018-12-02 PROCEDURE — 93613 INTRACARDIAC EPHYS 3D MAPG: CPT

## 2018-12-02 RX ORDER — ACETAMINOPHEN 500 MG
650 TABLET ORAL ONCE
Qty: 0 | Refills: 0 | Status: COMPLETED | OUTPATIENT
Start: 2018-12-02 | End: 2018-12-02

## 2018-12-02 RX ADMIN — Medication 400 MILLIGRAM(S): at 13:09

## 2018-12-02 RX ADMIN — SODIUM CHLORIDE 75 MILLILITER(S): 9 INJECTION INTRAMUSCULAR; INTRAVENOUS; SUBCUTANEOUS at 00:06

## 2018-12-02 RX ADMIN — Medication 400 MILLIGRAM(S): at 02:32

## 2018-12-02 RX ADMIN — APIXABAN 5 MILLIGRAM(S): 2.5 TABLET, FILM COATED ORAL at 11:36

## 2018-12-02 RX ADMIN — Medication 650 MILLIGRAM(S): at 02:55

## 2018-12-02 RX ADMIN — Medication 25 MILLIGRAM(S): at 05:19

## 2018-12-02 RX ADMIN — PANTOPRAZOLE SODIUM 40 MILLIGRAM(S): 20 TABLET, DELAYED RELEASE ORAL at 05:19

## 2018-12-02 NOTE — DISCHARGE NOTE ADULT - CARE PLAN
Principal Discharge DX:	Sinus tachycardia  Goal:	resolved  Assessment and plan of treatment:	cont metoprolol, follow up with op cardiologist  Secondary Diagnosis:	Atrial fibrillation, unspecified type  Goal:	controlled  Assessment and plan of treatment:	cont metoprolol, eliquis  Secondary Diagnosis:	Genital herpes simplex, unspecified site  Goal:	stable  Assessment and plan of treatment:	cont acyclovir  Secondary Diagnosis:	Gastroesophageal reflux disease, esophagitis presence not specified  Goal:	stable Principal Discharge DX:	Sinus tachycardia  Goal:	resolved  Assessment and plan of treatment:	cont metoprolol, follow up with op cardiologist  Secondary Diagnosis:	Atrial fibrillation, unspecified type  Goal:	controlled  Assessment and plan of treatment:	cont metoprolol, eliquis  Secondary Diagnosis:	Genital herpes simplex, unspecified site  Goal:	stable  Assessment and plan of treatment:	cont acyclovir  Secondary Diagnosis:	Gastroesophageal reflux disease, esophagitis presence not specified  Goal:	stable  Assessment and plan of treatment:	cont pantoprazole

## 2018-12-02 NOTE — DISCHARGE NOTE ADULT - PROVIDER TOKENS
TOKEN:'[id number]' TOKEN:'[id number]',FREE:[LAST:[Dr Bennett],FIRST:[Goran],PHONE:[phone]],FAX:[(   [phone]    -],ADDRESS:[PCP]]

## 2018-12-02 NOTE — DISCHARGE NOTE ADULT - SECONDARY DIAGNOSIS.
Atrial fibrillation, unspecified type Genital herpes simplex, unspecified site Gastroesophageal reflux disease, esophagitis presence not specified

## 2018-12-02 NOTE — DISCHARGE NOTE ADULT - CARE PROVIDERS DIRECT ADDRESSES
[email or web address]

## 2018-12-02 NOTE — DISCHARGE NOTE ADULT - HOSPITAL COURSE
68 f with AF, DJD c-spine, GERD, h/o breast cancer s/p lumpectomy/xrt, genital herpes on chronic suppressive antivirals, s/p ablation on 11/29 now a/w recurrent palpitations.  Pt states after ablation she felt well and went home.  Yesterday no symptoms.  Woke early this morning with palpitations which resolved spontaneously and she went back to sleep.  Later patient woke in morning and, while walking up stairs, felt palpitations return.  She tried to lay down and rest with no relief.  Called Dr. Su who advised an extra metoprolol which she tried but did not relieve symptoms.  Pt also felt mildly dyspneic but no cp/dizziness/n/v.    Pt states that she has no recent f/c, no uri symptoms.  + urinary frequency but attributes this to increased IVFs and PO fluids at home.  She was seen by EP, Cardiologist, started on betablocker, she is sinus tachy, no more palpitation, card cleared her for discharge, cont metoprolol, follow up with cardiologist-- will call the office  tomorrow for appointment. Spoke to Attending.

## 2018-12-02 NOTE — DISCHARGE NOTE ADULT - MEDICATION SUMMARY - MEDICATIONS TO TAKE
I will START or STAY ON the medications listed below when I get home from the hospital:    apixaban 5 mg oral tablet  -- 1 tab(s) by mouth every 12 hours  -- Indication: For Atrial fibrillation, unspecified type    acyclovir 400 mg oral tablet  -- 1 tab(s) by mouth 2 times a day  -- Indication: For Genital herpes simplex, unspecified site    metoprolol tartrate 25 mg oral tablet  -- 1 tab(s) by mouth 2 times a day  -- Indication: For Sinus tachycardia    cyclobenzaprine 5 mg oral tablet  -- 1 tab(s) by mouth 3 times a day, As Needed  -- Indication: For Spasm    pantoprazole 40 mg oral delayed release tablet  -- 1 tab(s) by mouth once a day (before a meal)  -- Indication: For Gastroesophageal reflux disease, esophagitis presence not specified

## 2018-12-02 NOTE — CONSULT NOTE ADULT - ASSESSMENT
Darya appears well with improved heart rate today and no symptoms. She may have had some component of dehydration with no clear dysrhythmia. I discussed her case with Dr. Murray and she can be discharged home. There is no indication for any further testing including echocardiography at present. She will call our office tomorrow for followup and further management can be dependent on her clinical course. She will continue her hydration and beta blocker therapy. Darya appears well with improved heart rate today and no symptoms. She may have had some component of dehydration with no clear dysrhythmia. I discussed her case with Dr. Murray and she can be discharged home. There is no indication for any further testing including echocardiography at present. She will call our office tomorrow for followup and further management can be dependent on her clinical course. She will continue her hydration, apixaban,  and beta blocker therapy.

## 2018-12-02 NOTE — DISCHARGE NOTE ADULT - CARE PROVIDER_API CALL
Sergio Choi), Cardiovascular Disease  [address] [place] [address]  Phone: [phone]  Fax: [phone] Sergio Choi (MD), Cardiovascular Disease  [address] [place] [address]  Phone: [phone]  Fax: [phone]    Goran Hills  PCP  Phone: [phone]  Fax: (   )    -
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
## 2018-12-02 NOTE — DISCHARGE NOTE ADULT - PATIENT PORTAL LINK FT
You can access the [email or web address] Portal, offered by [facility], by registering with the following website: [email or web address]

## 2018-12-02 NOTE — CHART NOTE - NSCHARTNOTEFT_GEN_A_CORE
68 f with AF, DJD c-spine, GERD, h/o breast cancer s/p lumpectomy/xrt, genital herpes on chronic suppressive antivirals, s/p ablation on 11/29 presented recurrent palpitations.  Pt states after ablation she felt well and went home.  Yesterday no symptoms.  Yesterday am woke up with palpitations which resolved spontaneously and she went back to sleep.  Later patient woke in morning and, while walking up stairs, felt palpitations return.  She tried to lay down and rest with no relief.  Called Dr. Su who advised an extra metoprolol which she tried but did not relieve symptoms.  Pt also felt mildly dyspneic but no cp/dizziness/n/v.   Pt states that she has no recent f/c, no uri symptoms.  + urinary frequency but attributes this to increased IVFs and PO fluids at home.  She was seen by EP, Cardiologist, started on betablocker, she is sinus tachy, no more palpitation, cardiology who discussed with EP attending cleared her for discharge on metoprolol with follow up with cardiologist-- will call the office  tomorrow for appointment.     Discharge time 32 minutes

## 2018-12-02 NOTE — DISCHARGE NOTE ADULT - PLAN OF CARE
resolved cont metoprolol, follow up with op cardiologist controlled cont metoprolol, eliquis stable cont acyclovir cont pantoprazole

## 2018-12-03 ENCOUNTER — NON-APPOINTMENT (OUTPATIENT)
Age: 68
End: 2018-12-03

## 2018-12-03 ENCOUNTER — APPOINTMENT (OUTPATIENT)
Dept: CARDIOLOGY | Facility: CLINIC | Age: 68
End: 2018-12-03
Payer: MEDICARE

## 2018-12-03 VITALS
OXYGEN SATURATION: 99 % | HEIGHT: 68 IN | BODY MASS INDEX: 21.37 KG/M2 | WEIGHT: 141 LBS | HEART RATE: 111 BPM | SYSTOLIC BLOOD PRESSURE: 117 MMHG | DIASTOLIC BLOOD PRESSURE: 79 MMHG

## 2018-12-03 PROCEDURE — 93000 ELECTROCARDIOGRAM COMPLETE: CPT

## 2018-12-03 PROCEDURE — 99214 OFFICE O/P EST MOD 30 MIN: CPT

## 2018-12-03 NOTE — PHYSICAL EXAM
[General Appearance - Well Developed] : well developed [Normal Appearance] : normal appearance [Well Groomed] : well groomed [General Appearance - Well Nourished] : well nourished [No Deformities] : no deformities [General Appearance - In No Acute Distress] : no acute distress [Normal Conjunctiva] : the conjunctiva exhibited no abnormalities [Eyelids - No Xanthelasma] : the eyelids demonstrated no xanthelasmas [Normal Oral Mucosa] : normal oral mucosa [No Oral Pallor] : no oral pallor [No Oral Cyanosis] : no oral cyanosis [Normal Jugular Venous A Waves Present] : normal jugular venous A waves present [Normal Jugular Venous V Waves Present] : normal jugular venous V waves present [No Jugular Venous Lucero A Waves] : no jugular venous lucero A waves [Respiration, Rhythm And Depth] : normal respiratory rhythm and effort [Exaggerated Use Of Accessory Muscles For Inspiration] : no accessory muscle use [Auscultation Breath Sounds / Voice Sounds] : lungs were clear to auscultation bilaterally [Abdomen Soft] : soft [Abdomen Tenderness] : non-tender [Abdomen Mass (___ Cm)] : no abdominal mass palpated [Abnormal Walk] : normal gait [Gait - Sufficient For Exercise Testing] : the gait was sufficient for exercise testing [Nail Clubbing] : no clubbing of the fingernails [Cyanosis, Localized] : no localized cyanosis [Petechial Hemorrhages (___cm)] : no petechial hemorrhages [Skin Color & Pigmentation] : normal skin color and pigmentation [] : no rash [No Venous Stasis] : no venous stasis [Skin Lesions] : no skin lesions [No Skin Ulcers] : no skin ulcer [No Xanthoma] : no  xanthoma was observed [Oriented To Time, Place, And Person] : oriented to person, place, and time [Affect] : the affect was normal [Mood] : the mood was normal [No Anxiety] : not feeling anxious [Normal S1] : normal S1 [Normal S2] : normal S2 [No Murmur] : no murmurs heard [2+] : left 2+ [No Abnormalities] : the abdominal aorta was not enlarged and no bruit was heard [No Pitting Edema] : no pitting edema present [Tachycardia] : tachycardic [S3] : no S3 [S4] : no S4 [Right Carotid Bruit] : no bruit heard over the right carotid [Left Carotid Bruit] : no bruit heard over the left carotid [Right Femoral Bruit] : no bruit heard over the right femoral artery [Left Femoral Bruit] : no bruit heard over the left femoral artery

## 2018-12-03 NOTE — HISTORY OF PRESENT ILLNESS
[FreeTextEntry1] : Mrs. Dalal is a 67 year old female with a history of an unclear idiopathic atrial arrhythmia, breast ca s/p surgery and radiation, here for follow up.\par \par Today, she is here for followup.  I last saw her 11/14/2018.\par Since this visit, she had atrial fibrillation ablation on 11/28/2018. Her pulmonary veins were successfully isolated. After the procedure, she was mildly tachycardic. An echocardiogram demonstrated a trace pericardial effusion. She was discharged safely 11/29/2018.\par \par She returned to the ER this past weekend with a fast heart rate. She was found to be in a sinus tachycardia at about 120 beats per minute. This improved with the administration of IV fluids. A bedside echocardiogram again demonstrated no significant pericardial effusion. Her blood counts were within normal limits. She was sent home yesterday, and is here for followup. \par She reports her heart rates to be in 105-115 range. She was explained that this may take some time to resolve. She denies dizziness, lightheadedness, and palpitations. Because of fast heart rates, she feels like she is exercising, but denies significant shortness of breath and chest pain\par \par  \par Prior:\par She was admitted to  on 7/12/2018 with palpitations. She was found to be in AF with RVR, and eventually converted back to SR with a cardizem gtt. She was unable to tolerate higher doses of Cardizem because of hypotension, and was sent home on Cardizem  mg daily. She was also started on a Eliquis 5 mg p.o. twice a day for CVA reduction.Echocardiogram was a difficult study, which showed normal LV systolic function and no significant valvular pathology.\par She was started on Toprol-XL 25 mg p.o. daily, and is currently taking it bid. [facility] and was found to be in SR at the ER.\par She had a 24 hour holter which showed APCs, and short runs of PAT (longest being 10 beats in duration at a HR of 124) but there was no evidence of atrial fibrillation.\par

## 2018-12-03 NOTE — DISCUSSION/SUMMARY
[With Me] : with me [___ Week(s)] : [unfilled] week(s) [FreeTextEntry1] : Mrs. Dalal is a 67 year old female with a history of an unclear idiopathic atrial arrhythmia, breast ca s/p surgery and radiation, here for follow up.\par Earlier this year, she was found to have atrial fibrillation and is now s/p AF ablation.\par \par Her blood pressure is at goal today. Her exam is unremarkable. She is in a sinus tachycardia at about 109 bpm, which I have explained to her is not unexpected. Recent echocardiogram post-procedure with no significant pericardial effusion and her blood counts are stable.\par I have increased her Toprol XL to 50 in am, 25 in pm. I have given her a script for colchicine, which could potentially decrease any post-procedure inflammation, as well as decrease the risk of recurrence in limited trials.\par She will continue her PPI and soft diet. She will stay hydrated. She will continue Eliquis 5 mg po bid and will monitor for bleeding.\par \par She knows to call with any issues or questions. She will follow up with me in 2 weeks.

## 2018-12-13 ENCOUNTER — APPOINTMENT (OUTPATIENT)
Dept: CARDIOLOGY | Facility: CLINIC | Age: 68
End: 2018-12-13
Payer: MEDICARE

## 2018-12-13 ENCOUNTER — NON-APPOINTMENT (OUTPATIENT)
Age: 68
End: 2018-12-13

## 2018-12-13 VITALS
BODY MASS INDEX: 21.37 KG/M2 | OXYGEN SATURATION: 99 % | HEIGHT: 68 IN | WEIGHT: 141 LBS | HEART RATE: 104 BPM | DIASTOLIC BLOOD PRESSURE: 74 MMHG | SYSTOLIC BLOOD PRESSURE: 106 MMHG

## 2018-12-13 PROCEDURE — 93000 ELECTROCARDIOGRAM COMPLETE: CPT

## 2018-12-13 PROCEDURE — 99214 OFFICE O/P EST MOD 30 MIN: CPT

## 2018-12-13 NOTE — PHYSICAL EXAM
[General Appearance - Well Developed] : well developed [Normal Appearance] : normal appearance [Well Groomed] : well groomed [General Appearance - Well Nourished] : well nourished [No Deformities] : no deformities [General Appearance - In No Acute Distress] : no acute distress [Normal Conjunctiva] : the conjunctiva exhibited no abnormalities [Eyelids - No Xanthelasma] : the eyelids demonstrated no xanthelasmas [Normal Oral Mucosa] : normal oral mucosa [No Oral Pallor] : no oral pallor [No Oral Cyanosis] : no oral cyanosis [Normal Jugular Venous A Waves Present] : normal jugular venous A waves present [Normal Jugular Venous V Waves Present] : normal jugular venous V waves present [No Jugular Venous Lucero A Waves] : no jugular venous lucero A waves [Respiration, Rhythm And Depth] : normal respiratory rhythm and effort [Exaggerated Use Of Accessory Muscles For Inspiration] : no accessory muscle use [Auscultation Breath Sounds / Voice Sounds] : lungs were clear to auscultation bilaterally [Abdomen Soft] : soft [Abdomen Tenderness] : non-tender [Abdomen Mass (___ Cm)] : no abdominal mass palpated [Abnormal Walk] : normal gait [Gait - Sufficient For Exercise Testing] : the gait was sufficient for exercise testing [Nail Clubbing] : no clubbing of the fingernails [Cyanosis, Localized] : no localized cyanosis [Petechial Hemorrhages (___cm)] : no petechial hemorrhages [Skin Color & Pigmentation] : normal skin color and pigmentation [] : no rash [No Venous Stasis] : no venous stasis [Skin Lesions] : no skin lesions [No Skin Ulcers] : no skin ulcer [No Xanthoma] : no  xanthoma was observed [Oriented To Time, Place, And Person] : oriented to person, place, and time [Affect] : the affect was normal [Mood] : the mood was normal [No Anxiety] : not feeling anxious [Tachycardia] : tachycardic [Normal S1] : normal S1 [Normal S2] : normal S2 [S3] : no S3 [S4] : no S4 [No Murmur] : no murmurs heard [Right Carotid Bruit] : no bruit heard over the right carotid [Left Carotid Bruit] : no bruit heard over the left carotid [Right Femoral Bruit] : no bruit heard over the right femoral artery [Left Femoral Bruit] : no bruit heard over the left femoral artery [2+] : left 2+ [No Abnormalities] : the abdominal aorta was not enlarged and no bruit was heard [No Pitting Edema] : no pitting edema present

## 2018-12-13 NOTE — DISCUSSION/SUMMARY
[___ Week(s)] : [unfilled] week(s) [With Me] : with me [FreeTextEntry1] : Mrs. Dalal is a 67 year old female with a history of an unclear idiopathic atrial arrhythmia, breast ca s/p surgery and radiation, here for follow up.\par Earlier this year, she was found to have atrial fibrillation and is now s/p AF ablation.\par \par Her blood pressure is at goal today. Her exam is unremarkable. Her heart rate is lower today and she is feeling better. Recent echocardiogram post-procedure with no significant pericardial effusion and her blood counts are stable.\par She is to continue Toprol XL to 50 in am, 25 in pm. She will continue colchicine temporarily, which could potentially decrease any post-procedure inflammation, as well as decrease the risk of recurrence in limited trials. She has not had any diarrhea.\par She will continue her PPI and soft diet. She will stay hydrated. She will continue Eliquis 5 mg po bid and will monitor for bleeding. She is following up with Dr. Murray next week.\par \par She knows to call with any issues or questions. She will follow up with me in 1/2019.

## 2018-12-13 NOTE — HISTORY OF PRESENT ILLNESS
[FreeTextEntry1] : Mrs. Dalal is a 67 year old female with a history of an unclear idiopathic atrial arrhythmia, breast ca s/p surgery and radiation, here for follow up.\par \par Today, she is here for followup.  I last saw her 12/3/2018.\par She had an atrial fibrillation ablation on 11/28/2018. Her pulmonary veins were successfully isolated. After the procedure, she was mildly tachycardic. An echocardiogram demonstrated a trace pericardial effusion. She was discharged safely 11/29/2018.\par She returned to the ER the weekend after the procedure with a fast heart rate. She was found to be in a sinus tachycardia at about 120 beats per minute. This improved with the administration of IV fluids. A bedside echocardiogram again demonstrated no significant pericardial effusion. Her blood counts were within normal limits. \par \par Since last visit, she is finally feeling a little better. She had a single episode of "heart racing" last week, but this resolved within a minute. She has stopped checking her pulse all day, though reports most numbers in the 100-110 range.\par She denies dizziness, lightheadedness, and palpitations.She denies chest pain and difficulty breathing.\par \par \par Prior:\par She was admitted to  on 7/12/2018 with palpitations. She was found to be in AF with RVR, and eventually converted back to SR with a cardizem gtt. She was unable to tolerate higher doses of Cardizem because of hypotension, and was sent home on Cardizem  mg daily. She was also started on a Eliquis 5 mg p.o. twice a day for CVA reduction.Echocardiogram was a difficult study, which showed normal LV systolic function and no significant valvular pathology.\par She was started on Toprol-XL 25 mg p.o. daily, and is currently taking it bid. She had an [facility], and was found to be in SR at the ER.\par She had a 24 hour holter which showed APCs, and short runs of PAT (longest being 10 beats in duration at a HR of 124) but there was no evidence of atrial fibrillation.\par

## 2018-12-20 ENCOUNTER — NON-APPOINTMENT (OUTPATIENT)
Age: 68
End: 2018-12-20

## 2018-12-20 ENCOUNTER — APPOINTMENT (OUTPATIENT)
Dept: ELECTROPHYSIOLOGY | Facility: CLINIC | Age: 68
End: 2018-12-20
Payer: MEDICARE

## 2018-12-20 VITALS
DIASTOLIC BLOOD PRESSURE: 83 MMHG | HEART RATE: 117 BPM | OXYGEN SATURATION: 99 % | WEIGHT: 138 LBS | BODY MASS INDEX: 20.92 KG/M2 | HEIGHT: 68 IN | SYSTOLIC BLOOD PRESSURE: 113 MMHG

## 2018-12-20 NOTE — HISTORY OF PRESENT ILLNESS
[FreeTextEntry1] : Constant tachycardia.  Symptoms are worse in the morning.  She feels her heart racing and is out of breath.  she is not in AF, but has a tachycardia.  No lightheadedness/dizziness. The groin is well healed.

## 2018-12-20 NOTE — PHYSICAL EXAM
[General Appearance - Well Developed] : well developed [Normal Appearance] : normal appearance [Well Groomed] : well groomed [General Appearance - Well Nourished] : well nourished [No Deformities] : no deformities [General Appearance - In No Acute Distress] : no acute distress [Normal Conjunctiva] : the conjunctiva exhibited no abnormalities [Eyelids - No Xanthelasma] : the eyelids demonstrated no xanthelasmas [Normal Oral Mucosa] : normal oral mucosa [No Oral Pallor] : no oral pallor [No Oral Cyanosis] : no oral cyanosis [Normal Jugular Venous A Waves Present] : normal jugular venous A waves present [Normal Jugular Venous V Waves Present] : normal jugular venous V waves present [No Jugular Venous Lucero A Waves] : no jugular venous lucero A waves [Respiration, Rhythm And Depth] : normal respiratory rhythm and effort [Exaggerated Use Of Accessory Muscles For Inspiration] : no accessory muscle use [Auscultation Breath Sounds / Voice Sounds] : lungs were clear to auscultation bilaterally [Heart Rate And Rhythm] : heart rate and rhythm were normal [Heart Sounds] : normal S1 and S2 [Murmurs] : no murmurs present [Abdomen Soft] : soft [Abdomen Tenderness] : non-tender [Abdomen Mass (___ Cm)] : no abdominal mass palpated [Abnormal Walk] : normal gait [Gait - Sufficient For Exercise Testing] : the gait was sufficient for exercise testing [Nail Clubbing] : no clubbing of the fingernails [Cyanosis, Localized] : no localized cyanosis [Petechial Hemorrhages (___cm)] : no petechial hemorrhages [Skin Color & Pigmentation] : normal skin color and pigmentation [] : no rash [No Venous Stasis] : no venous stasis [Skin Lesions] : no skin lesions [No Skin Ulcers] : no skin ulcer [No Xanthoma] : no  xanthoma was observed [Oriented To Time, Place, And Person] : oriented to person, place, and time [Affect] : the affect was normal [Mood] : the mood was normal [No Anxiety] : not feeling anxious

## 2018-12-20 NOTE — DISCUSSION/SUMMARY
[FreeTextEntry1] : Continues to have tachycardia which may be SNRT.  We discussed possible repeat ablation, but I would prefer to wait through the inflammation period.  For now we will add flecainide. He will have stress test with his PMD.

## 2019-01-29 ENCOUNTER — APPOINTMENT (OUTPATIENT)
Dept: CARDIOLOGY | Facility: CLINIC | Age: 69
End: 2019-01-29
Payer: MEDICARE

## 2019-01-29 PROCEDURE — 93015 CV STRESS TEST SUPVJ I&R: CPT

## 2019-01-31 ENCOUNTER — NON-APPOINTMENT (OUTPATIENT)
Age: 69
End: 2019-01-31

## 2019-01-31 ENCOUNTER — APPOINTMENT (OUTPATIENT)
Dept: CARDIOLOGY | Facility: CLINIC | Age: 69
End: 2019-01-31
Payer: MEDICARE

## 2019-01-31 VITALS
BODY MASS INDEX: 21.67 KG/M2 | SYSTOLIC BLOOD PRESSURE: 96 MMHG | DIASTOLIC BLOOD PRESSURE: 64 MMHG | WEIGHT: 143 LBS | HEIGHT: 68 IN | OXYGEN SATURATION: 100 % | HEART RATE: 90 BPM

## 2019-01-31 PROCEDURE — 93000 ELECTROCARDIOGRAM COMPLETE: CPT

## 2019-01-31 PROCEDURE — 99214 OFFICE O/P EST MOD 30 MIN: CPT

## 2019-01-31 NOTE — DISCUSSION/SUMMARY
[___ Month(s)] : [unfilled] month(s) [With Me] : with me [FreeTextEntry1] : Mrs. 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 An exercise stress test demonstrated no significant QRS widening, QT prolongation or ventricular/atrial arrhythmias while on flecainide 50 mg po bid. This will be increased to 100 mg po bid today.\par \par She is to continue Toprol XL to 50 in am, 25 in pm. \par She will continue Eliquis 5 mg po bid and will monitor for bleeding. \par She is to follow up with EP in 2/2019 regarding the possibility of ablation near the SN.\par \par

## 2019-01-31 NOTE — HISTORY OF PRESENT ILLNESS
[FreeTextEntry1] : Mrs. Dalal is a 68 year old female with a history of an unclear idiopathic atrial arrhythmia, breast ca s/p surgery and radiation, here for follow up.\par \par Today, she is here for followup.  I last saw her 12/13/2018.\par She had an atrial fibrillation ablation on 11/28/2018. Her pulmonary veins were successfully isolated. After the procedure, she was mildly tachycardic and demonstrated a sinus node re-entrant tachycardia. An echocardiogram demonstrated a trace pericardial effusion. She was discharged safely 11/29/2018.\par She returned to the ER the weekend after the procedure with a fast heart rate. She was found to be in a sinus tachycardia at about 120 beats per minute. This improved with the administration of IV fluids. A bedside echocardiogram again demonstrated no significant pericardial effusion. Her blood counts were within normal limits. \par \par Since last visit, she continues to be short of breath. Her resting heart rate is anywhere from high 90s to 120. She reports dyspnea on exertion, and is unable to complete her normal tasks or run around with her grandkids.\par She is currently taking flecainide 50 mg b.i.d., and Toprol-XL 50 in the morning, and 25 at night. She denies chest pain and significant palpitations, though she feel like her heart is racing.\par She had an exercise stress test yesterday, that demonstrated no significant QRS widening, or ventricular arrhythmia.\par She denies dizziness, lightheadedness and near syncope. She has had issues with constipation lately.\par \par \par Prior:\par She was admitted to  on 7/12/2018 with palpitations. She was found to be in AF with RVR, and eventually converted back to SR with a cardizem gtt. She was unable to tolerate higher doses of Cardizem because of hypotension, and was sent home on Cardizem  mg daily. She was also started on a Eliquis 5 mg p.o. twice a day for CVA reduction.Echocardiogram was a difficult study, which showed normal LV systolic function and no significant valvular pathology.\par She was started on Toprol-XL 25 mg p.o. daily, and is currently taking it bid. She had an episode of palpitations in 8/2018 in [facility], and was found to be in SR at the ER.\par She had a 24 hour holter which showed APCs, and short runs of PAT (longest being 10 beats in duration at a HR of 124) but there was no evidence of atrial fibrillation.\par

## 2019-02-07 ENCOUNTER — MEDICATION RENEWAL (OUTPATIENT)
Age: 69
End: 2019-02-07

## 2019-02-07 NOTE — PATIENT PROFILE ADULT. - VISION (WITH CORRECTIVE LENSES IF THE PATIENT USUALLY WEARS THEM):
Normal vision: sees adequately in most situations; can see medication labels, newsprint You can access the [facility] Patient Portal, offered by [facility], by registering with the following website: [email or web address]

## 2019-02-28 ENCOUNTER — APPOINTMENT (OUTPATIENT)
Dept: ELECTROPHYSIOLOGY | Facility: CLINIC | Age: 69
End: 2019-02-28

## 2019-02-28 ENCOUNTER — NON-APPOINTMENT (OUTPATIENT)
Age: 69
End: 2019-02-28

## 2019-02-28 VITALS
BODY MASS INDEX: 21.22 KG/M2 | OXYGEN SATURATION: 98 % | HEART RATE: 110 BPM | DIASTOLIC BLOOD PRESSURE: 75 MMHG | SYSTOLIC BLOOD PRESSURE: 106 MMHG | HEIGHT: 68 IN | WEIGHT: 140 LBS

## 2019-02-28 PROCEDURE — 93000 ELECTROCARDIOGRAM COMPLETE: CPT

## 2019-02-28 PROCEDURE — 99215 OFFICE O/P EST HI 40 MIN: CPT

## 2019-02-28 RX ORDER — PANTOPRAZOLE 40 MG/1
40 TABLET, DELAYED RELEASE ORAL
Qty: 30 | Refills: 0 | Status: DISCONTINUED | COMMUNITY
Start: 2018-11-29 | End: 2019-02-28

## 2019-02-28 RX ORDER — COLCHICINE 0.6 MG/1
0.6 TABLET ORAL TWICE DAILY
Qty: 60 | Refills: 0 | Status: DISCONTINUED | COMMUNITY
Start: 2018-12-03 | End: 2019-02-28

## 2019-02-28 NOTE — DISCUSSION/SUMMARY
[FreeTextEntry1] : Continues to have an elevated HR.  As noted during the EPS this may be a high darlyn AT or sinus node reentry.  Alternatively may be sinus tachy as often seen post LA ablation.  She is highly symptomatic despite flecainide and beta blocker therapy.  After a lengthy discussion, highlighting the possibility of SND requiring pacing, phrenic nerve injury, or SVC syndrome, we have decided to proceed with a repeat EPS/ablation. All of her questions were answered and the procedure will be arranged.

## 2019-02-28 NOTE — HISTORY OF PRESENT ILLNESS
[FreeTextEntry1] : Feels a little better, but notes that her heart rate is fast.  She is under a lot of stress.  She is constantly checking her HR and had been obsessing about it.  SHe has not had any AF, but notes that she remains very tachycardic.  Her exercise tolerance has been very limited.  She clarifies: overall no AF, but not doing well at all.

## 2019-03-12 ENCOUNTER — APPOINTMENT (OUTPATIENT)
Dept: ELECTROPHYSIOLOGY | Facility: CLINIC | Age: 69
End: 2019-03-12

## 2019-03-21 ENCOUNTER — OUTPATIENT (OUTPATIENT)
Dept: OUTPATIENT SERVICES | Facility: HOSPITAL | Age: 69
LOS: 1 days | End: 2019-03-21
Payer: MEDICARE

## 2019-03-21 VITALS
TEMPERATURE: 98 F | HEART RATE: 136 BPM | DIASTOLIC BLOOD PRESSURE: 80 MMHG | WEIGHT: 139.99 LBS | HEIGHT: 68 IN | OXYGEN SATURATION: 98 % | SYSTOLIC BLOOD PRESSURE: 133 MMHG | RESPIRATION RATE: 18 BRPM

## 2019-03-21 DIAGNOSIS — Z01.818 ENCOUNTER FOR OTHER PREPROCEDURAL EXAMINATION: ICD-10-CM

## 2019-03-21 DIAGNOSIS — I48.91 UNSPECIFIED ATRIAL FIBRILLATION: ICD-10-CM

## 2019-03-21 DIAGNOSIS — Z98.890 OTHER SPECIFIED POSTPROCEDURAL STATES: Chronic | ICD-10-CM

## 2019-03-21 DIAGNOSIS — Z90.721 ACQUIRED ABSENCE OF OVARIES, UNILATERAL: Chronic | ICD-10-CM

## 2019-03-21 LAB
ALBUMIN SERPL ELPH-MCNC: 4.7 G/DL — SIGNIFICANT CHANGE UP (ref 3.3–5)
ALP SERPL-CCNC: 109 U/L — SIGNIFICANT CHANGE UP (ref 40–120)
ALT FLD-CCNC: 26 U/L — SIGNIFICANT CHANGE UP (ref 10–45)
ANION GAP SERPL CALC-SCNC: 14 MMOL/L — SIGNIFICANT CHANGE UP (ref 5–17)
AST SERPL-CCNC: 31 U/L — SIGNIFICANT CHANGE UP (ref 10–40)
BILIRUB SERPL-MCNC: 0.3 MG/DL — SIGNIFICANT CHANGE UP (ref 0.2–1.2)
BLD GP AB SCN SERPL QL: NEGATIVE — SIGNIFICANT CHANGE UP
BUN SERPL-MCNC: 13 MG/DL — SIGNIFICANT CHANGE UP (ref 7–23)
CALCIUM SERPL-MCNC: 10.1 MG/DL — SIGNIFICANT CHANGE UP (ref 8.4–10.5)
CHLORIDE SERPL-SCNC: 102 MMOL/L — SIGNIFICANT CHANGE UP (ref 96–108)
CO2 SERPL-SCNC: 27 MMOL/L — SIGNIFICANT CHANGE UP (ref 22–31)
CREAT SERPL-MCNC: 0.76 MG/DL — SIGNIFICANT CHANGE UP (ref 0.5–1.3)
GLUCOSE SERPL-MCNC: 100 MG/DL — HIGH (ref 70–99)
HCT VFR BLD CALC: 45.1 % — HIGH (ref 34.5–45)
HGB BLD-MCNC: 14.8 G/DL — SIGNIFICANT CHANGE UP (ref 11.5–15.5)
INR BLD: 1.09 RATIO — SIGNIFICANT CHANGE UP (ref 0.88–1.16)
MCHC RBC-ENTMCNC: 29.2 PG — SIGNIFICANT CHANGE UP (ref 27–34)
MCHC RBC-ENTMCNC: 32.7 GM/DL — SIGNIFICANT CHANGE UP (ref 32–36)
MCV RBC AUTO: 89.2 FL — SIGNIFICANT CHANGE UP (ref 80–100)
PLATELET # BLD AUTO: 212 K/UL — SIGNIFICANT CHANGE UP (ref 150–400)
POTASSIUM SERPL-MCNC: 3.9 MMOL/L — SIGNIFICANT CHANGE UP (ref 3.5–5.3)
POTASSIUM SERPL-SCNC: 3.9 MMOL/L — SIGNIFICANT CHANGE UP (ref 3.5–5.3)
PROT SERPL-MCNC: 7.6 G/DL — SIGNIFICANT CHANGE UP (ref 6–8.3)
PROTHROM AB SERPL-ACNC: 12.5 SEC — SIGNIFICANT CHANGE UP (ref 10–12.9)
RBC # BLD: 5.06 M/UL — SIGNIFICANT CHANGE UP (ref 3.8–5.2)
RBC # FLD: 13.2 % — SIGNIFICANT CHANGE UP (ref 10.3–14.5)
RH IG SCN BLD-IMP: POSITIVE — SIGNIFICANT CHANGE UP
SODIUM SERPL-SCNC: 143 MMOL/L — SIGNIFICANT CHANGE UP (ref 135–145)
WBC # BLD: 5.8 K/UL — SIGNIFICANT CHANGE UP (ref 3.8–10.5)
WBC # FLD AUTO: 5.8 K/UL — SIGNIFICANT CHANGE UP (ref 3.8–10.5)

## 2019-03-21 PROCEDURE — 93010 ELECTROCARDIOGRAM REPORT: CPT

## 2019-03-21 RX ORDER — CYCLOBENZAPRINE HYDROCHLORIDE 10 MG/1
1 TABLET, FILM COATED ORAL
Qty: 0 | Refills: 0 | COMMUNITY

## 2019-03-21 RX ORDER — METOPROLOL TARTRATE 50 MG
1 TABLET ORAL
Qty: 0 | Refills: 0 | COMMUNITY

## 2019-03-21 RX ORDER — ACYCLOVIR SODIUM 500 MG
1 VIAL (EA) INTRAVENOUS
Qty: 0 | Refills: 0 | COMMUNITY

## 2019-03-21 RX ORDER — ACETAMINOPHEN 500 MG
2 TABLET ORAL
Qty: 0 | Refills: 0 | COMMUNITY

## 2019-03-21 NOTE — H&P CARDIOLOGY - HISTORY OF PRESENT ILLNESS
69 yo  female pt with PMHx of AFib on Eliquis with previous ablation (11/29/2018), DJD c-spine, GERD, h/o breast cancer s/p lumpectomy with RTX (2016), genital herpes on chronic suppressive antivirals, palpitations after ablation now returned for PST for ablation in tomorrow. Pt reports she was treated tachycardia with medication since ablation, denies chest pain but has mild SOB. 69 yo  female pt with PMHx of AFib on Eliquis with previous ablation (11/29/2018), DJD c-spine, GERD, h/o breast cancer s/p lumpectomy with RTX (2016), genital herpes on chronic suppressive antivirals, palpitations after ablation now returned for PST for ablation in tomorrow. Pt reports she was treated tachycardia with medication since ablation, denies chest pain but has mild SOB.    Addendum to above note: pt felt dizzy after H&P interview, HR monitor showed with -188's, Valsalva Maneuver with ice pack on carotid area brought HR down to 113 to 118's. Dr. Murray aware.   Encouraged increase po fluid this pm and monitor HR and symptoms. HR down to 109-113 at 11:54am.

## 2019-03-22 ENCOUNTER — INPATIENT (INPATIENT)
Facility: HOSPITAL | Age: 69
LOS: 0 days | Discharge: HOME | DRG: 274 | End: 2019-03-23
Attending: INTERNAL MEDICINE | Admitting: INTERNAL MEDICINE
Payer: MEDICARE

## 2019-03-22 VITALS
SYSTOLIC BLOOD PRESSURE: 111 MMHG | DIASTOLIC BLOOD PRESSURE: 74 MMHG | HEART RATE: 89 BPM | OXYGEN SATURATION: 100 % | TEMPERATURE: 99 F | RESPIRATION RATE: 18 BRPM

## 2019-03-22 DIAGNOSIS — I48.91 UNSPECIFIED ATRIAL FIBRILLATION: ICD-10-CM

## 2019-03-22 DIAGNOSIS — Z90.721 ACQUIRED ABSENCE OF OVARIES, UNILATERAL: Chronic | ICD-10-CM

## 2019-03-22 DIAGNOSIS — Z98.890 OTHER SPECIFIED POSTPROCEDURAL STATES: Chronic | ICD-10-CM

## 2019-03-22 PROCEDURE — 93613 INTRACARDIAC EPHYS 3D MAPG: CPT

## 2019-03-22 PROCEDURE — 93010 ELECTROCARDIOGRAM REPORT: CPT

## 2019-03-22 PROCEDURE — 93623 PRGRMD STIMJ&PACG IV RX NFS: CPT | Mod: 26

## 2019-03-22 PROCEDURE — 99222 1ST HOSP IP/OBS MODERATE 55: CPT

## 2019-03-22 PROCEDURE — 93621 COMP EP EVL L PAC&REC C SINS: CPT | Mod: 26

## 2019-03-22 PROCEDURE — 93655 ICAR CATH ABLTJ DSCRT ARRHYT: CPT

## 2019-03-22 PROCEDURE — 93653 COMPRE EP EVAL TX SVT: CPT

## 2019-03-22 PROCEDURE — 93662 INTRACARDIAC ECG (ICE): CPT | Mod: 26

## 2019-03-22 PROCEDURE — 93462 L HRT CATH TRNSPTL PUNCTURE: CPT

## 2019-03-22 PROCEDURE — 71045 X-RAY EXAM CHEST 1 VIEW: CPT | Mod: 26

## 2019-03-22 RX ORDER — ACETAMINOPHEN 500 MG
650 TABLET ORAL ONCE
Qty: 0 | Refills: 0 | Status: DISCONTINUED | OUTPATIENT
Start: 2019-03-22 | End: 2019-03-23

## 2019-03-22 RX ORDER — CYCLOBENZAPRINE HYDROCHLORIDE 10 MG/1
5 TABLET, FILM COATED ORAL THREE TIMES A DAY
Qty: 0 | Refills: 0 | Status: DISCONTINUED | OUTPATIENT
Start: 2019-03-22 | End: 2019-03-23

## 2019-03-22 RX ORDER — OXYCODONE HYDROCHLORIDE 5 MG/1
5 TABLET ORAL ONCE
Qty: 0 | Refills: 0 | Status: DISCONTINUED | OUTPATIENT
Start: 2019-03-22 | End: 2019-03-22

## 2019-03-22 RX ORDER — ACYCLOVIR SODIUM 500 MG
400 VIAL (EA) INTRAVENOUS
Qty: 0 | Refills: 0 | Status: DISCONTINUED | OUTPATIENT
Start: 2019-03-22 | End: 2019-03-23

## 2019-03-22 RX ORDER — APIXABAN 2.5 MG/1
5 TABLET, FILM COATED ORAL EVERY 12 HOURS
Qty: 0 | Refills: 0 | Status: DISCONTINUED | OUTPATIENT
Start: 2019-03-22 | End: 2019-03-23

## 2019-03-22 RX ADMIN — OXYCODONE HYDROCHLORIDE 5 MILLIGRAM(S): 5 TABLET ORAL at 16:45

## 2019-03-22 RX ADMIN — OXYCODONE HYDROCHLORIDE 5 MILLIGRAM(S): 5 TABLET ORAL at 16:06

## 2019-03-22 RX ADMIN — APIXABAN 5 MILLIGRAM(S): 2.5 TABLET, FILM COATED ORAL at 18:31

## 2019-03-22 RX ADMIN — Medication 400 MILLIGRAM(S): at 18:31

## 2019-03-22 NOTE — PROGRESS NOTE ADULT - SUBJECTIVE AND OBJECTIVE BOX
Pre-op Diagnosis:  Atrial tachycardia    Post-op Diagnosis: same    Procedure: EPS/Ablation    Electrophysiologist: Trevor    Anesthesia: Dr. Cardenas    Access: RFV (sonosite guided)    Description:  The patient presented to the electrophysiology laboratory in sinus tachycardia.  During catheter manipulation she went into atrial tachcyardia.  Activation mapping was performed and showed earliest activation at the high darlyn/SVC.  It became difficult to induce after some time.  The decision was made to isolate the SVC below the level of the early region. Post isolation the SVC demonstrated automaticity at a rate faster than the atrium.  With the aid of intracardiac echo and fluoroscopy transeptal puncture was performed (mean LA= 12 mmHg).  A 3D electroanaotmic map of the LA was created.  All 4 PVs remained isolated from the prior procedure.  Repeat stimulation on isuprel demonstrated tachycardia c/w sinus tachy but would start and stop abruptly.  Activation mapping showed earlies activation near the sinus node.  The lower region of the SVC isolation was extended caudally.  There was occassional junctional beats.  The SVC remained isolation.  SNRT testing post ablation reproducibly yeilded a junctional beat at < 1200 ms.      Complications: none    EBL: < 50 cc    Disposition: PICU    Plan: Continue Eliquis
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
## 2019-03-22 NOTE — PROGRESS NOTE ADULT - SUBJECTIVE AND OBJECTIVE BOX
====================  CCU MIDNIGHT ROUNDS  ====================    EULALIO RASHID  [id number]    Patient is a 68y old  Female who presents for elective SVT ablation    ====================  SUMMARY: 68F with breast ca s/p surgery and radiation, who is s/p AF ablation on 11/28/2018. She has been tachycardic more recently, and is short of breath with exertion. 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  ====================    ====================  NEW EVENTS: None  ====================    MEDICATIONS  (STANDING):  acyclovir   Oral Tab/Cap 400 milliGRAM(s) Oral two times a day  apixaban 5 milliGRAM(s) Oral every 12 hours    MEDICATIONS  (PRN):  acetaminophen   Tablet .. 650 milliGRAM(s) Oral once PRN Mild Pain (1 - 3)  cyclobenzaprine 5 milliGRAM(s) Oral three times a day PRN Muscle Spasm    ====================  VITALS (Last 12 hrs):  ====================  T(C): 36.6 (03-22-19 @ 18:30), Max: 37.1 (03-22-19 @ 15:00)  T(F): 97.8 (03-22-19 @ 18:30), Max: 98.7 (03-22-19 @ 15:00)  HR: 91 (03-22-19 @ 21:00) (86 - 93)  BP: 84/63 (03-22-19 @ 21:00) (84/63 - 115/66)  BP(mean): 71 (03-22-19 @ 21:00) (69 - 86)  RR: 11 (03-22-19 @ 20:00) (11 - 20)  SpO2: 99% (03-22-19 @ 21:00) (97% - 100%)      I&O's Summary    22 Mar 2019 07:01  -  22 Mar 2019 21:43  --------------------------------------------------------  IN: 440 mL / OUT: 800 mL / NET: -360 mL      ====================  NEW LABS:  ====================  03-21    143  |  102  |  13  ----------------------------<  100<H>  3.9   |  27  |  0.76    Ca    10.1      21 Mar 2019 11:38    TPro  7.6  /  Alb  4.7  /  TBili  0.3  /  DBili  x   /  AST  31  /  ALT  26  /  AlkPhos  109  03-21    PT/INR - ( 21 Mar 2019 11:38 )   PT: 12.5 sec;   INR: 1.09 ratio       ====================  PLAN:  ====================  #SVT s/p ablation   - Resume Eliquis 5mg po bid   - DC Flecainide + Metoprolol   - Monitor telemetry closely   - No echo needed in AM  - DC planning       Fouzia Workman CCU NP  Beeper #[id number]  Spectra # [phone]

## 2019-03-22 NOTE — CONSULT NOTE ADULT - ASSESSMENT
Mrs. Dalal is a 68 year old female with breast ca s/p surgery and radiation, who is s/p AF ablation on 11/28/2018. She has been tachycardic more recently, and is short of breath with exertion. 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    - Tachycardic in 130's this morning, suggestive of a sinus node re-entrant tachycardia vs an AT. She has been off flecainide and metoprolol for the last few days  - She has held eliquis in preparation for the procedure  - no sign of acute ischemia or volume overload  - plan for ablation today  - Will follow with you post-ablation. She will follow up in the office in 2 weeks post.

## 2019-03-23 ENCOUNTER — TRANSCRIPTION ENCOUNTER (OUTPATIENT)
Age: 69
End: 2019-03-23

## 2019-03-23 VITALS
RESPIRATION RATE: 20 BRPM | OXYGEN SATURATION: 99 % | DIASTOLIC BLOOD PRESSURE: 68 MMHG | SYSTOLIC BLOOD PRESSURE: 104 MMHG | HEART RATE: 94 BPM

## 2019-03-23 DIAGNOSIS — I47.2 VENTRICULAR TACHYCARDIA: ICD-10-CM

## 2019-03-23 LAB
ALBUMIN SERPL ELPH-MCNC: 3.4 G/DL — SIGNIFICANT CHANGE UP (ref 3.3–5)
ALP SERPL-CCNC: 82 U/L — SIGNIFICANT CHANGE UP (ref 40–120)
ALT FLD-CCNC: 17 U/L — SIGNIFICANT CHANGE UP (ref 10–45)
ANION GAP SERPL CALC-SCNC: 11 MMOL/L — SIGNIFICANT CHANGE UP (ref 5–17)
APTT BLD: 26.1 SEC — LOW (ref 27.5–36.3)
AST SERPL-CCNC: 36 U/L — SIGNIFICANT CHANGE UP (ref 10–40)
BILIRUB SERPL-MCNC: 0.4 MG/DL — SIGNIFICANT CHANGE UP (ref 0.2–1.2)
BUN SERPL-MCNC: 10 MG/DL — SIGNIFICANT CHANGE UP (ref 7–23)
CALCIUM SERPL-MCNC: 8.8 MG/DL — SIGNIFICANT CHANGE UP (ref 8.4–10.5)
CHLORIDE SERPL-SCNC: 109 MMOL/L — HIGH (ref 96–108)
CO2 SERPL-SCNC: 24 MMOL/L — SIGNIFICANT CHANGE UP (ref 22–31)
CREAT SERPL-MCNC: 0.68 MG/DL — SIGNIFICANT CHANGE UP (ref 0.5–1.3)
GLUCOSE SERPL-MCNC: 107 MG/DL — HIGH (ref 70–99)
HCT VFR BLD CALC: 37 % — SIGNIFICANT CHANGE UP (ref 34.5–45)
HGB BLD-MCNC: 12.3 G/DL — SIGNIFICANT CHANGE UP (ref 11.5–15.5)
INR BLD: 1.17 RATIO — HIGH (ref 0.88–1.16)
MAGNESIUM SERPL-MCNC: 2 MG/DL — SIGNIFICANT CHANGE UP (ref 1.6–2.6)
MCHC RBC-ENTMCNC: 29.9 PG — SIGNIFICANT CHANGE UP (ref 27–34)
MCHC RBC-ENTMCNC: 33.1 GM/DL — SIGNIFICANT CHANGE UP (ref 32–36)
MCV RBC AUTO: 90.2 FL — SIGNIFICANT CHANGE UP (ref 80–100)
PHOSPHATE SERPL-MCNC: 3.1 MG/DL — SIGNIFICANT CHANGE UP (ref 2.5–4.5)
PLATELET # BLD AUTO: 158 K/UL — SIGNIFICANT CHANGE UP (ref 150–400)
POTASSIUM SERPL-MCNC: 3.9 MMOL/L — SIGNIFICANT CHANGE UP (ref 3.5–5.3)
POTASSIUM SERPL-SCNC: 3.9 MMOL/L — SIGNIFICANT CHANGE UP (ref 3.5–5.3)
PROT SERPL-MCNC: 5.8 G/DL — LOW (ref 6–8.3)
PROTHROM AB SERPL-ACNC: 13.5 SEC — HIGH (ref 10–12.9)
RBC # BLD: 4.1 M/UL — SIGNIFICANT CHANGE UP (ref 3.8–5.2)
RBC # FLD: 13.4 % — SIGNIFICANT CHANGE UP (ref 10.3–14.5)
SODIUM SERPL-SCNC: 144 MMOL/L — SIGNIFICANT CHANGE UP (ref 135–145)
WBC # BLD: 7.6 K/UL — SIGNIFICANT CHANGE UP (ref 3.8–10.5)
WBC # FLD AUTO: 7.6 K/UL — SIGNIFICANT CHANGE UP (ref 3.8–10.5)

## 2019-03-23 PROCEDURE — 93653 COMPRE EP EVAL TX SVT: CPT

## 2019-03-23 PROCEDURE — 93321 DOPPLER ECHO F-UP/LMTD STD: CPT

## 2019-03-23 PROCEDURE — 84100 ASSAY OF PHOSPHORUS: CPT

## 2019-03-23 PROCEDURE — 93010 ELECTROCARDIOGRAM REPORT: CPT

## 2019-03-23 PROCEDURE — 93308 TTE F-UP OR LMTD: CPT

## 2019-03-23 PROCEDURE — 93462 L HRT CATH TRNSPTL PUNCTURE: CPT

## 2019-03-23 PROCEDURE — 93655 ICAR CATH ABLTJ DSCRT ARRHYT: CPT

## 2019-03-23 PROCEDURE — C1759: CPT

## 2019-03-23 PROCEDURE — 80053 COMPREHEN METABOLIC PANEL: CPT

## 2019-03-23 PROCEDURE — 85730 THROMBOPLASTIN TIME PARTIAL: CPT

## 2019-03-23 PROCEDURE — 93621 COMP EP EVL L PAC&REC C SINS: CPT

## 2019-03-23 PROCEDURE — C1894: CPT

## 2019-03-23 PROCEDURE — 86900 BLOOD TYPING SEROLOGIC ABO: CPT

## 2019-03-23 PROCEDURE — 83735 ASSAY OF MAGNESIUM: CPT

## 2019-03-23 PROCEDURE — C1766: CPT

## 2019-03-23 PROCEDURE — 93321 DOPPLER ECHO F-UP/LMTD STD: CPT | Mod: 26

## 2019-03-23 PROCEDURE — C1731: CPT

## 2019-03-23 PROCEDURE — 86901 BLOOD TYPING SEROLOGIC RH(D): CPT

## 2019-03-23 PROCEDURE — 93005 ELECTROCARDIOGRAM TRACING: CPT

## 2019-03-23 PROCEDURE — 93623 PRGRMD STIMJ&PACG IV RX NFS: CPT

## 2019-03-23 PROCEDURE — 85610 PROTHROMBIN TIME: CPT

## 2019-03-23 PROCEDURE — 71045 X-RAY EXAM CHEST 1 VIEW: CPT

## 2019-03-23 PROCEDURE — 99232 SBSQ HOSP IP/OBS MODERATE 35: CPT

## 2019-03-23 PROCEDURE — 85027 COMPLETE CBC AUTOMATED: CPT

## 2019-03-23 PROCEDURE — C1730: CPT

## 2019-03-23 PROCEDURE — 93613 INTRACARDIAC EPHYS 3D MAPG: CPT

## 2019-03-23 PROCEDURE — 93308 TTE F-UP OR LMTD: CPT | Mod: 26

## 2019-03-23 PROCEDURE — G0463: CPT

## 2019-03-23 PROCEDURE — C1732: CPT

## 2019-03-23 PROCEDURE — 86850 RBC ANTIBODY SCREEN: CPT

## 2019-03-23 PROCEDURE — 93662 INTRACARDIAC ECG (ICE): CPT

## 2019-03-23 RX ORDER — APIXABAN 2.5 MG/1
1 TABLET, FILM COATED ORAL
Qty: 60 | Refills: 3 | OUTPATIENT
Start: 2019-03-23 | End: 2019-07-20

## 2019-03-23 RX ORDER — FLECAINIDE ACETATE 50 MG
1 TABLET ORAL
Qty: 0 | Refills: 0 | COMMUNITY

## 2019-03-23 RX ORDER — METOPROLOL TARTRATE 50 MG
1 TABLET ORAL
Qty: 0 | Refills: 0 | COMMUNITY

## 2019-03-23 RX ADMIN — APIXABAN 5 MILLIGRAM(S): 2.5 TABLET, FILM COATED ORAL at 07:15

## 2019-03-23 RX ADMIN — Medication 400 MILLIGRAM(S): at 07:15

## 2019-03-23 NOTE — PROGRESS NOTE ADULT - SUBJECTIVE AND OBJECTIVE BOX
CCU2/PICU NOTE    Admission date: 3/22/19  Chief complaint/ Diagnosis: SVT ablation  HPI: 69 y/o F w/ pmhx of afib (on Eliquis, s/p ablation), DJD cspine, GERD, breast ca(s/p lumpectomy)  admitted s/p SVT ablation    Interval history: Uneventful overnight  Pt remains SR and hemodynamically stable    REVIEW OF SYSTEMS  Denies CP, Palpitation, SOB, Dyspnea [ x ] All other systems negative    MEDICATIONS  (STANDING):  acyclovir   Oral Tab/Cap 400 milliGRAM(s) Oral two times a day  apixaban 5 milliGRAM(s) Oral every 12 hours    MEDICATIONS  (PRN):  acetaminophen   Tablet .. 650 milliGRAM(s) Oral once PRN Mild Pain (1 - 3)  cyclobenzaprine 5 milliGRAM(s) Oral three times a day PRN Muscle Spasm    Allergy: adhesives (Unknown)  Cipro (Hepatotoxicity)  Gluten (Stomach Upset)  sulfa drugs (Rash)  tetracycline (Rash)  Valium (Short breath; Faint; Other)    ICU Vital Signs Last 24 Hrs  T(C): 36.6 (Max: 37.1)  HR: 83  (79 - 93)  BP: 96/65  (77/66 - 115/66)  RR: 20 (11 - 22)  SpO2: 100% (97% - 100%)    I&O's Summary  IN: 800 mL / OUT: 1800 mL / NET: -1000 mL    PHYSICAL EXAM  Appearance: Normal, NAD  HEAD:  Normocephalic  EYES: PERRLA, conjunctiva and sclera clear  NECK: Supple, No JVD  CHEST/LUNG: Clear to auscultation bilaterally; No wheezing  HEART: Normal S1, S2. No murmurs, rubs, or gallops  ABDOMEN: + Bowel sounds, Soft, NT, ND   EXTREMITIES:  2+ Peripheral Pulses, No clubbing, cyanosis, or edema  NEUROLOGY: non-focal, aaox3  SKIN: No rashes or lesions    Interpretation of Telemetry:                   12.3   7.6   )-----------( 158                  37.0     144  |  109<H>  |  10  ----------------------------<  107<H>  3.9   |  24  |  0.68    Ca    8.8    Phos  3.1   Mg     2.0      TPro  5.8<L>  /  Alb  3.4  /  TBili  0.4  /  DBili  x   /  AST  36  /  ALT  17  /  AlkPhos  82

## 2019-03-23 NOTE — PROGRESS NOTE ADULT - ASSESSMENT
Mrs. Dalal is a 68 year old female with breast ca s/p surgery and radiation, who is s/p AF ablation on 11/28/2018. She has been tachycardic more recently, and is short of breath with exertion. 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 Charge from a general cardiovascular standpoint and clearance is awaited by electrophysiology. We will follow her in the office as an outpatient

## 2019-03-23 NOTE — PROGRESS NOTE ADULT - PROBLEM SELECTOR PLAN 1
s/p SVT ablation  pt remains SR overnight  SBP 70's (baseline 's)  TTE today  Cont. Eliquis  Ambulate as tolerated  D/C home

## 2019-03-23 NOTE — PROGRESS NOTE ADULT - ASSESSMENT
67 y/o F w/ pmhx of afib (on Eliquis, s/p ablation), DJD cspine, GERD, breast ca(s/p lumpectomy)  admitted s/p SVT ablation

## 2019-03-23 NOTE — DISCHARGE NOTE PROVIDER - HOSPITAL COURSE
HPI: 68F with breast ca s/p surgery and radiation, who is s/p AF ablation on 11/28/2018. She has been tachycardic more recently, and is short of breath with exertion. 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        Pre-op Diagnosis:  Atrial tachycardia        Post-op Diagnosis: same        Procedure: EPS/Ablation        Electrophysiologist: Trevor        Anesthesia: Dr. Cardenas        Access: RFV (sonosite guided)        Description:  The patient presented to the electrophysiology laboratory in sinus tachycardia.  During catheter manipulation she went into atrial tachcyardia.  Activation mapping was performed and showed earliest activation at the high darlyn/SVC.  It became difficult to induce after some time.  The decision was made to isolate the SVC below the level of the early region. Post isolation the SVC demonstrated automaticity at a rate faster than the atrium.  With the aid of intracardiac echo and fluoroscopy transeptal puncture was performed (mean LA= 12 mmHg).  A 3D electroanaotmic map of the LA was created.  All 4 PVs remained isolated from the prior procedure.  Repeat stimulation on isuprel demonstrated tachycardia c/w sinus tachy but would start and stop abruptly.  Activation mapping showed earlies activation near the sinus node.  The lower region of the SVC isolation was extended caudally.  There was occassional junctional beats.  The SVC remained isolation.  SNRT testing post ablation reproducibly yeilded a junctional beat at < 1200 ms.          Complications: none        EBL: < 50 cc        Disposition: PICU        Plan: Continue Eliquis HPI: 68F with breast ca s/p surgery and radiation, who is s/p AF ablation on 11/28/2018. She has been tachycardic more recently, and is short of breath with exertion. 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 Pt underwent SVT ablation by Dr. Murray 3/22/19 and admitted to CCU2 for further monitoring.  Eliquis restarted and no anti-arrhythmic agents started. Pt remains SR and hemodynamically stable. Pt became hypotensive SBP 70/50 in am( baseline BP 90's)_ stat echo shows no pericardial effusion. Pt ambulates w/o difficulties  Bilateral groin stable w/o hematoma or bleeding. Pt will be discharged w/ Eliquis and home medications. Pt will follow up w/ Dr. Murray on 5/7/19 3:20pm

## 2019-03-23 NOTE — DISCHARGE NOTE NURSING/CASE MANAGEMENT/SOCIAL WORK - NSDCDPATPORTLINK_GEN_ALL_CORE
You can access the [facility] Patient Portal, offered by [facility], by registering with the following website: [email or web address]

## 2019-03-23 NOTE — DISCHARGE NOTE PROVIDER - NSDCCPCAREPLAN_GEN_ALL_CORE_FT
PRINCIPAL DISCHARGE DIAGNOSIS  Diagnosis: Re-entrant atrial tachycardia  Assessment and Plan of Treatment: Patient is s/p ablation   Goal: Your heart rate and rhythm will be controlled and you will remain in normal sinus ryhthem   Continue with your cardiologist and primary care MD. Continue your current medications. Call your physician for palpitations, feelings of rapid heart beat, lightheadedness, or dizziness. No heavy lifting, strenuous activity, bending, straining, or unnecessary stair climbing for 2 weeks. No driving for 2 days. You may shower 24 hours following the procedure but avoid baths/swimming for 1 week. Check your groin site for bleeding and/or swelling daily following procedure and call your doctor immediately if it occurs or if you experience increased pain at the site. Follow up with your cardiologist in 1-2 weeks. You may call [facility]  if you have any questions/concerns regarding your procedure [phone].      SECONDARY DISCHARGE DIAGNOSES  Diagnosis: Afib  Assessment and Plan of Treatment: Goal: Your heart rate and rhythm will be controlled.   Continue with your cardiologist and primary care MD. Continue your current medications. Call your physician for palpitations, feelings of rapid heart beat, lightheadedness, or dizziness.

## 2019-03-27 ENCOUNTER — INBOUND DOCUMENT (OUTPATIENT)
Age: 69
End: 2019-03-27

## 2019-04-01 ENCOUNTER — NON-APPOINTMENT (OUTPATIENT)
Age: 69
End: 2019-04-01

## 2019-04-01 ENCOUNTER — APPOINTMENT (OUTPATIENT)
Dept: CARDIOLOGY | Facility: CLINIC | Age: 69
End: 2019-04-01
Payer: MEDICARE

## 2019-04-01 VITALS
DIASTOLIC BLOOD PRESSURE: 76 MMHG | SYSTOLIC BLOOD PRESSURE: 120 MMHG | BODY MASS INDEX: 21.67 KG/M2 | HEART RATE: 83 BPM | WEIGHT: 143 LBS | OXYGEN SATURATION: 100 % | HEIGHT: 68 IN

## 2019-04-01 PROCEDURE — 99214 OFFICE O/P EST MOD 30 MIN: CPT

## 2019-04-01 PROCEDURE — 93000 ELECTROCARDIOGRAM COMPLETE: CPT

## 2019-04-01 NOTE — DISCUSSION/SUMMARY
[___ Month(s)] : [unfilled] month(s) [With Me] : with me [FreeTextEntry1] : Mrs. Dalal is a 68 year old female with a history of an unclear idiopathic atrial arrhythmia, breast ca s/p surgery and radiation, here for follow up. Earlier in 2018, she was found to have atrial fibrillation and is now s/p AF ablation. Afterwards, she presented with a persistent tachycardia, and is now s/p ablation of a high darlyn/SVC AT.\par Her heart rate is now in the 80s, which is significantly improved. Her EKG demonstrates a low atrial rhythm (and was accelerated junctional prior to discharge on 3/22). I think some of her symptoms are from this low atrial focus and possible resultant cardiac dyssynchrony.\par I have started her back on toprol XL 25 mg po bid and will monitor her at home, initially with a 48 monitor, then event monitor.\par She will continue Eliquis 5 mg po bid and will monitor for bleeding. \par She will contact me at the end of the week to see if there is any improvement in her symptoms.\par \par

## 2019-04-01 NOTE — HISTORY OF PRESENT ILLNESS
[FreeTextEntry1] : Mrs. Dalal is a 68 year old female with a history of an unclear idiopathic atrial arrhythmia, breast ca s/p surgery and radiation, here for follow up.\par \par Today, she is here for followup.  I last saw her 1/31/2018.\par She had an atrial fibrillation ablation on 11/28/2018. Her pulmonary veins were successfully isolated. 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 Despite flecainide and Toprol XL, she had persistent symptoms and resting tachycardia.\par She is now s/p ablation on 3/22 of a high darlyn/SVC AT with sinus node modification. The SVC demonstrated automaticity at a rate faster than the atrium. After the procedure, her hr was in the 80's in an accelerated junctional rhythm. She was a little hypotensive post, and an echocardiogram was unremarkable.\par \par Today, she is not feeling well. Fortunately, her heart rate has been in the 80s, not 110's. She reports strong heart beats, especially when she lays down. She feels that he heart is "dyschronous" and that she feels breathless. No chest pain or dizziness.\par \par \par \par She is currently taking flecainide 50 mg b.i.d., and Toprol-XL 50 in the morning, and 25 at night. She denies chest pain and significant palpitations, though she feel like her heart is racing.\par She had an exercise stress test yesterday, that demonstrated no significant QRS widening, or ventricular arrhythmia.\par She denies dizziness, lightheadedness and near syncope. She has had issues with constipation lately.\par \par \par Prior:\par She was admitted to  on 7/12/2018 with palpitations. She was found to be in AF with RVR, and eventually converted back to SR with a cardizem gtt. She was unable to tolerate higher doses of Cardizem because of hypotension, and was sent home on Cardizem  mg daily. She was also started on a Eliquis 5 mg p.o. twice a day for CVA reduction.Echocardiogram was a difficult study, which showed normal LV systolic function and no significant valvular pathology.\par She was started on Toprol-XL 25 mg p.o. daily, and is currently taking it bid. She had an episode of palpitations in 8/2018 in [place], and was found to be in SR at the ER.\par She had a 24 hour holter which showed APCs, and short runs of PAT (longest being 10 beats in duration at a HR of 124) but there was no evidence of atrial fibrillation.\par

## 2019-04-18 ENCOUNTER — APPOINTMENT (OUTPATIENT)
Dept: CARDIOLOGY | Facility: CLINIC | Age: 69
End: 2019-04-18
Payer: MEDICARE

## 2019-04-18 PROCEDURE — 93224 XTRNL ECG REC UP TO 48 HRS: CPT

## 2019-05-09 ENCOUNTER — NON-APPOINTMENT (OUTPATIENT)
Age: 69
End: 2019-05-09

## 2019-05-09 ENCOUNTER — APPOINTMENT (OUTPATIENT)
Dept: ELECTROPHYSIOLOGY | Facility: CLINIC | Age: 69
End: 2019-05-09
Payer: MEDICARE

## 2019-05-09 VITALS
HEART RATE: 74 BPM | SYSTOLIC BLOOD PRESSURE: 120 MMHG | DIASTOLIC BLOOD PRESSURE: 75 MMHG | WEIGHT: 143 LBS | OXYGEN SATURATION: 99 % | BODY MASS INDEX: 21.67 KG/M2 | HEIGHT: 68 IN

## 2019-05-09 DIAGNOSIS — R00.0 TACHYCARDIA, UNSPECIFIED: ICD-10-CM

## 2019-05-09 PROCEDURE — 93000 ELECTROCARDIOGRAM COMPLETE: CPT

## 2019-05-09 PROCEDURE — 99213 OFFICE O/P EST LOW 20 MIN: CPT

## 2019-05-09 RX ORDER — FLECAINIDE ACETATE 100 MG/1
100 TABLET ORAL TWICE DAILY
Qty: 60 | Refills: 5 | Status: DISCONTINUED | COMMUNITY
Start: 2018-12-20 | End: 2019-05-09

## 2019-05-09 NOTE — DISCUSSION/SUMMARY
[FreeTextEntry1] : Doing well post repeat ablation.  ECG shows low lying atrial pacemaker.   Holter OK. She is tolerating low dose metoprolol.  COntinue current therapy for now.  Extended monitor 3 months post ablation.  We can reassess the role of metoprolol at that time.

## 2019-05-09 NOTE — REASON FOR VISIT
[Atrial Fibrillation] : atrial fibrillation [Follow-Up - Clinic] : a clinic follow-up of [Sick Sinus Syndrome] : sick sinus syndrome [Supraventricular Tachycardia] : supraventricular tachycardia [Spouse] : spouse

## 2019-05-09 NOTE — HISTORY OF PRESENT ILLNESS
[FreeTextEntry1] : Was put back on the beta blocker because of the sensed breathlessness.  This improved on resumption of the medication.  CUrrent she feels "85% better".  She can walk up and down stairs and be more active with less shortness of breath. No lightheadedness/dizziness.  The groin is well healed.

## 2019-05-09 NOTE — PHYSICAL EXAM
[General Appearance - Well Developed] : well developed [Normal Appearance] : normal appearance [Well Groomed] : well groomed [General Appearance - Well Nourished] : well nourished [General Appearance - In No Acute Distress] : no acute distress [No Deformities] : no deformities [Normal Conjunctiva] : the conjunctiva exhibited no abnormalities [Eyelids - No Xanthelasma] : the eyelids demonstrated no xanthelasmas [Normal Jugular Venous A Waves Present] : normal jugular venous A waves present [Normal Jugular Venous V Waves Present] : normal jugular venous V waves present [No Jugular Venous Lucero A Waves] : no jugular venous lucero A waves [Exaggerated Use Of Accessory Muscles For Inspiration] : no accessory muscle use [Respiration, Rhythm And Depth] : normal respiratory rhythm and effort [Auscultation Breath Sounds / Voice Sounds] : lungs were clear to auscultation bilaterally [Heart Rate And Rhythm] : heart rate and rhythm were normal [Heart Sounds] : normal S1 and S2 [Murmurs] : no murmurs present [Abdomen Soft] : soft [Abdomen Tenderness] : non-tender [Abdomen Mass (___ Cm)] : no abdominal mass palpated [Abnormal Walk] : normal gait [Nail Clubbing] : no clubbing of the fingernails [Gait - Sufficient For Exercise Testing] : the gait was sufficient for exercise testing [Petechial Hemorrhages (___cm)] : no petechial hemorrhages [Cyanosis, Localized] : no localized cyanosis [Skin Color & Pigmentation] : normal skin color and pigmentation [No Venous Stasis] : no venous stasis [] : no rash [Skin Lesions] : no skin lesions [No Skin Ulcers] : no skin ulcer [No Xanthoma] : no  xanthoma was observed [Oriented To Time, Place, And Person] : oriented to person, place, and time [Affect] : the affect was normal [Mood] : the mood was normal [No Anxiety] : not feeling anxious

## 2019-07-31 ENCOUNTER — RX RENEWAL (OUTPATIENT)
Age: 69
End: 2019-07-31

## 2019-09-03 ENCOUNTER — TRANSCRIPTION ENCOUNTER (OUTPATIENT)
Age: 69
End: 2019-09-03

## 2019-09-19 ENCOUNTER — APPOINTMENT (OUTPATIENT)
Dept: CARDIOLOGY | Facility: CLINIC | Age: 69
End: 2019-09-19
Payer: MEDICARE

## 2019-09-19 ENCOUNTER — NON-APPOINTMENT (OUTPATIENT)
Age: 69
End: 2019-09-19

## 2019-09-19 VITALS
HEART RATE: 75 BPM | HEIGHT: 68 IN | BODY MASS INDEX: 21.82 KG/M2 | OXYGEN SATURATION: 100 % | DIASTOLIC BLOOD PRESSURE: 74 MMHG | WEIGHT: 144 LBS | SYSTOLIC BLOOD PRESSURE: 115 MMHG

## 2019-09-19 PROCEDURE — 93000 ELECTROCARDIOGRAM COMPLETE: CPT

## 2019-09-19 PROCEDURE — 99214 OFFICE O/P EST MOD 30 MIN: CPT

## 2019-09-19 NOTE — HISTORY OF PRESENT ILLNESS
[FreeTextEntry1] : Mrs. Dalal is a 68 year old female with a history of an unclear idiopathic atrial arrhythmia, breast ca s/p surgery and radiation, here for follow up.\par \par Today, she is here for followup.  I last saw her 4/2019.\par \par She had an atrial fibrillation ablation on 11/28/2018. Her pulmonary veins were successfully isolated. 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 Despite flecainide and Toprol XL, she had persistent symptoms and resting tachycardia.\par She is now s/p ablation on 3/22 of a high darlyn/SVC AT with sinus node modification. The SVC demonstrated automaticity at a rate faster than the atrium. After the procedure, her hr was in the 80's in an accelerated junctional rhythm. She was a little hypotensive post, and an echocardiogram was unremarkable.\par \par She is feeling well today. She had a single episode of fast heart rates, which did not feel like her previous episodes of AF. She took an extra 12.5 of Toprol XL, and the symptoms resolved. She has been under a lot of stress. She has been taking Toprol XL 12.5 bid. She is back to her normal life and feeling much better overall.\par She denies dizziness, lightheadedness and near syncope. She had an event monitor in 7/2019 that demonstrated an intermittent sinus rhythm, with short runs of SVT. There was nothing suggestive of atrial fibrillation.\par \par \par Prior:\par She was admitted to  on 7/12/2018 with palpitations. She was found to be in AF with RVR, and eventually converted back to SR with a cardizem gtt. She was unable to tolerate higher doses of Cardizem because of hypotension, and was sent home on Cardizem  mg daily. She was also started on a Eliquis 5 mg p.o. twice a day for CVA reduction.Echocardiogram was a difficult study, which showed normal LV systolic function and no significant valvular pathology.\par She was started on Toprol-XL 25 mg p.o. daily, and is currently taking it bid. She had an episode of palpitations in 8/2018 in [facility], and was found to be in SR at the ER.\par She had a 24 hour holter which showed APCs, and short runs of PAT (longest being 10 beats in duration at a HR of 124) but there was no evidence of atrial fibrillation.\par

## 2019-09-19 NOTE — PHYSICAL EXAM
[General Appearance - Well Developed] : well developed [Normal Appearance] : normal appearance [Well Groomed] : well groomed [General Appearance - Well Nourished] : well nourished [No Deformities] : no deformities [General Appearance - In No Acute Distress] : no acute distress [Normal Conjunctiva] : the conjunctiva exhibited no abnormalities [Normal Oral Mucosa] : normal oral mucosa [Eyelids - No Xanthelasma] : the eyelids demonstrated no xanthelasmas [No Oral Pallor] : no oral pallor [No Oral Cyanosis] : no oral cyanosis [Normal Jugular Venous A Waves Present] : normal jugular venous A waves present [Normal Jugular Venous V Waves Present] : normal jugular venous V waves present [No Jugular Venous Lucero A Waves] : no jugular venous lucero A waves [Respiration, Rhythm And Depth] : normal respiratory rhythm and effort [Exaggerated Use Of Accessory Muscles For Inspiration] : no accessory muscle use [Auscultation Breath Sounds / Voice Sounds] : lungs were clear to auscultation bilaterally [Abdomen Soft] : soft [Abdomen Tenderness] : non-tender [Abdomen Mass (___ Cm)] : no abdominal mass palpated [Abnormal Walk] : normal gait [Gait - Sufficient For Exercise Testing] : the gait was sufficient for exercise testing [Nail Clubbing] : no clubbing of the fingernails [Cyanosis, Localized] : no localized cyanosis [Petechial Hemorrhages (___cm)] : no petechial hemorrhages [Skin Color & Pigmentation] : normal skin color and pigmentation [] : no rash [Skin Lesions] : no skin lesions [No Venous Stasis] : no venous stasis [No Skin Ulcers] : no skin ulcer [No Xanthoma] : no  xanthoma was observed [Oriented To Time, Place, And Person] : oriented to person, place, and time [Affect] : the affect was normal [Mood] : the mood was normal [No Anxiety] : not feeling anxious [Normal Rate] : normal [Normal S1] : normal S1 [Normal S2] : normal S2 [S3] : no S3 [S4] : no S4 [No Murmur] : no murmurs heard [Right Carotid Bruit] : no bruit heard over the right carotid [Left Carotid Bruit] : no bruit heard over the left carotid [Right Femoral Bruit] : no bruit heard over the right femoral artery [Left Femoral Bruit] : no bruit heard over the left femoral artery [2+] : left 2+ [No Abnormalities] : the abdominal aorta was not enlarged and no bruit was heard [No Pitting Edema] : no pitting edema present

## 2019-09-19 NOTE — REASON FOR VISIT
[Follow-Up - Clinic] : a clinic follow-up of [Atrial Fibrillation] : atrial fibrillation [Spouse] : spouse

## 2019-09-19 NOTE — DISCUSSION/SUMMARY
[___ Month(s)] : [unfilled] month(s) [With Me] : with me [FreeTextEntry1] : Mrs. Dalal is a 68 year old female with a history of an unclear idiopathic atrial arrhythmia, breast ca s/p surgery and radiation, here for follow up. \par Earlier in 2018, she was found to have atrial fibrillation and is now s/p AF ablation. Afterwards, she presented with a persistent tachycardia, and is now s/p ablation of a high darlyn/SVC AT.\par \par She is feeling much better today. Her EKG continues to demonstrates a low atrial rhythm, though she is feeling much better on toprol XL 12.5 bid, which she will continue.\par \par She will continue Eliquis 5 mg po bid and will monitor for bleeding. She would like to get off a/c, though she did have some nonsustained svt on a recent monitor, and I think we should continue for now. She reports no abnormal bleeding.\par \par She will continue to eat right and exercise. She will follow up with me in 4-6 months.\par \par

## 2020-01-06 NOTE — ED ADULT TRIAGE NOTE - TEMPERATURE IN CELSIUS (DEGREES C)
Gaby Phan  1/6/2020  [id number]    Chief Complaint: Orthostatic hypotension    Subjective:   No issues overnight; resting in bed this AM.    ROS: No n/v, cp, sob, f/c  Objective:  Patient Vitals for the past 24 hrs:   BP Temp Temp src Pulse Resp SpO2   01/06/20 1145 (!) 98/59 -- -- -- -- --   01/06/20 0815 (!) 154/72 97.9 °F (36.6 °C) Oral 89 16 94 %   01/05/20 1921 (!) 164/72 97.9 °F (36.6 °C) Oral 94 16 93 %   01/05/20 1715 139/80 -- -- 75 -- --   01/05/20 1300 (!) 178/81 -- -- 74 -- --     Gen: No distress, pleasant. HEENT: Normocephalic, atraumatic. CV: Regular rate and rhythm. Resp: No respiratory distress. Abd: Soft, nontender   Ext: No edema. Neuro: Alert, oriented, appropriately interactive. Wt Readings from Last 3 Encounters:   01/05/20 112 lb 12.8 oz (51.2 kg)   07/02/19 110 lb (49.9 kg)   05/21/19 109 lb (49.4 kg)       Laboratory data:   Lab Results   Component Value Date    WBC 5.5 01/02/2020    HGB 9.7 (L) 01/02/2020    HCT 28.4 (L) 01/02/2020    MCV 99.7 01/02/2020     01/02/2020       Lab Results   Component Value Date     01/02/2020    K 3.7 01/02/2020     01/02/2020    CO2 24 01/02/2020    BUN 13 01/02/2020    CREATININE 0.6 01/02/2020    GLUCOSE 132 01/02/2020    CALCIUM 8.6 01/02/2020        Therapy progress:  PT  Position Activity Restriction  Other position/activity restrictions: Medium fall risk per nursing  Objective     Sit to Stand: Supervision  Stand to sit: Supervision  Bed to Chair: Contact guard assistance  Device: Rolling Walker  Assistance: Stand by assistance  Distance: 180 ft   OT  PT Equipment Recommendations  Equipment Needed: Yes  Mobility Devices: Jarred Herberth: Rolling  Toilet - Technique: Ambulating  Equipment Used: Standard toilet  Toilet Transfers Comments: CGA no device use of grab bar  Assessment        SLP                Body mass index is 18.77 kg/m².     Rehabilitation Diagnosis:  16.0, Debility (non-cardiac, 36.6

## 2020-01-13 ENCOUNTER — APPOINTMENT (OUTPATIENT)
Dept: CARDIOLOGY | Facility: CLINIC | Age: 70
End: 2020-01-13
Payer: MEDICARE

## 2020-01-13 ENCOUNTER — NON-APPOINTMENT (OUTPATIENT)
Age: 70
End: 2020-01-13

## 2020-01-13 VITALS
HEIGHT: 68 IN | SYSTOLIC BLOOD PRESSURE: 108 MMHG | WEIGHT: 147 LBS | DIASTOLIC BLOOD PRESSURE: 74 MMHG | OXYGEN SATURATION: 100 % | BODY MASS INDEX: 22.28 KG/M2 | HEART RATE: 86 BPM

## 2020-01-13 PROCEDURE — 99214 OFFICE O/P EST MOD 30 MIN: CPT

## 2020-01-13 PROCEDURE — 93000 ELECTROCARDIOGRAM COMPLETE: CPT

## 2020-01-13 NOTE — DISCUSSION/SUMMARY
[___ Month(s)] : [unfilled] month(s) [With Me] : with me [FreeTextEntry1] : Mrs. Dalal is a 69 year old female with a history of an unclear idiopathic atrial arrhythmia, breast ca s/p surgery and radiation, here for follow up. \par Earlier in 2018, she was found to have atrial fibrillation and is now s/p AF ablation. Afterwards, she presented with a persistent tachycardia, and is now s/p ablation of a high darlyn/SVC AT.\par \par She is feeling much better today and just went on a hike without issue. Her EKG continues to demonstrates a low atrial rhythm, though she is feeling much better on toprol XL 12.5 bid, which she will continue.\par \par She will continue Eliquis 5 mg po bid and will monitor for bleeding. \par \par She will continue to eat right and exercise. She is to get back to her normal life.  She will follow up with me in 4-6 months.\par \par

## 2020-01-13 NOTE — REASON FOR VISIT
[Follow-Up - Clinic] : a clinic follow-up of [Spouse] : spouse [Atrial Fibrillation] : atrial fibrillation

## 2020-01-13 NOTE — PHYSICAL EXAM
[General Appearance - Well Developed] : well developed [Normal Appearance] : normal appearance [Well Groomed] : well groomed [No Deformities] : no deformities [General Appearance - Well Nourished] : well nourished [Normal Conjunctiva] : the conjunctiva exhibited no abnormalities [General Appearance - In No Acute Distress] : no acute distress [Eyelids - No Xanthelasma] : the eyelids demonstrated no xanthelasmas [No Oral Pallor] : no oral pallor [Normal Oral Mucosa] : normal oral mucosa [No Oral Cyanosis] : no oral cyanosis [Normal Jugular Venous A Waves Present] : normal jugular venous A waves present [Normal Jugular Venous V Waves Present] : normal jugular venous V waves present [No Jugular Venous Lucero A Waves] : no jugular venous lucero A waves [Respiration, Rhythm And Depth] : normal respiratory rhythm and effort [Exaggerated Use Of Accessory Muscles For Inspiration] : no accessory muscle use [Auscultation Breath Sounds / Voice Sounds] : lungs were clear to auscultation bilaterally [Abdomen Soft] : soft [Abdomen Tenderness] : non-tender [Abdomen Mass (___ Cm)] : no abdominal mass palpated [Gait - Sufficient For Exercise Testing] : the gait was sufficient for exercise testing [Abnormal Walk] : normal gait [Nail Clubbing] : no clubbing of the fingernails [Cyanosis, Localized] : no localized cyanosis [Petechial Hemorrhages (___cm)] : no petechial hemorrhages [Skin Color & Pigmentation] : normal skin color and pigmentation [] : no rash [No Venous Stasis] : no venous stasis [Skin Lesions] : no skin lesions [No Skin Ulcers] : no skin ulcer [No Xanthoma] : no  xanthoma was observed [Oriented To Time, Place, And Person] : oriented to person, place, and time [Affect] : the affect was normal [Mood] : the mood was normal [No Anxiety] : not feeling anxious [Normal Rate] : normal [Normal S1] : normal S1 [Normal S2] : normal S2 [No Murmur] : no murmurs heard [2+] : left 2+ [No Abnormalities] : the abdominal aorta was not enlarged and no bruit was heard [No Pitting Edema] : no pitting edema present [S4] : no S4 [S3] : no S3 [Left Carotid Bruit] : no bruit heard over the left carotid [Right Carotid Bruit] : no bruit heard over the right carotid [Right Femoral Bruit] : no bruit heard over the right femoral artery [Left Femoral Bruit] : no bruit heard over the left femoral artery

## 2020-01-13 NOTE — HISTORY OF PRESENT ILLNESS
[FreeTextEntry1] : Mrs. Dalal is a 69 year old female with a history of an unclear idiopathic atrial arrhythmia, breast ca s/p surgery and radiation, here for follow up.\par \par Today, she is here for followup.  I last saw her 9/2019.\par \par She had an atrial fibrillation ablation on 11/28/2018. Her pulmonary veins were successfully isolated. 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 Despite flecainide and Toprol XL, she had persistent symptoms and resting tachycardia.\par \par She is now s/p ablation on 3/22/2019 of a high darlyn/SVC AT with sinus node modification. The SVC demonstrated automaticity at a rate faster than the atrium. After the procedure, her hr was in the 80's in an accelerated junctional rhythm. She was a little hypotensive post, and an echocardiogram was unremarkable.\par \par She is feeling well today, finally. She just got back from a cruise and [place]. She wants to get back to the gym.\par She has been taking Toprol XL 12.5 bid. She is back to her normal life and feeling much better overall.\par She denies dizziness, lightheadedness and near syncope. She had an event monitor in 7/2019 that demonstrated an intermittent sinus rhythm, with short runs of SVT. There was nothing suggestive of atrial fibrillation.\par \par \par Prior:\par She was admitted to  on 7/12/2018 with palpitations. She was found to be in AF with RVR, and eventually converted back to SR with a cardizem gtt. She was unable to tolerate higher doses of Cardizem because of hypotension, and was sent home on Cardizem  mg daily. She was also started on a Eliquis 5 mg p.o. twice a day for CVA reduction.Echocardiogram was a difficult study, which showed normal LV systolic function and no significant valvular pathology.\par She was started on Toprol-XL 25 mg p.o. daily, and is currently taking it bid. She had an episode of palpitations in 8/2018 in [facility], and was found to be in SR at the ER.\par She had a 24 hour holter which showed APCs, and short runs of PAT (longest being 10 beats in duration at a HR of 124) but there was no evidence of atrial fibrillation.\par

## 2020-02-22 NOTE — ED ADULT TRIAGE NOTE - AS O2 DELIVERY
Head: atraumatic, normacephalic  Face: atraumatic, no crepitus no orbiral/maxillary/mandibular ttp  throat: uvula midline no exudates  eyes: perrla eomi  heart: rrr s1s2  lungs: ctab  abd: soft, nd +bs no rebound/guarding no cva ttp, RLQ tenderness, all other quadrant benign   skin: warm  LE: no swelling, no calf ttp  back: no midline cervical/thoracic/lumbar ttp room air

## 2020-05-25 ENCOUNTER — TRANSCRIPTION ENCOUNTER (OUTPATIENT)
Age: 70
End: 2020-05-25

## 2020-06-06 NOTE — ED PROVIDER NOTE - CPE EDP CARDIAC NORM
Has indwelling catheter since his hip surgery in May  Had abnormal urine analysis this morning, culture pending and started on oral Bactrim, not sure if he got a dose or not  Urine in the ED is abnormal, culture pending  We will continue IV Rocephin cultures pending   normal...

## 2020-06-22 ENCOUNTER — APPOINTMENT (OUTPATIENT)
Dept: CARDIOLOGY | Facility: CLINIC | Age: 70
End: 2020-06-22
Payer: MEDICARE

## 2020-06-22 ENCOUNTER — NON-APPOINTMENT (OUTPATIENT)
Age: 70
End: 2020-06-22

## 2020-06-22 VITALS
OXYGEN SATURATION: 99 % | HEART RATE: 73 BPM | WEIGHT: 147 LBS | BODY MASS INDEX: 22.28 KG/M2 | HEIGHT: 68 IN | SYSTOLIC BLOOD PRESSURE: 122 MMHG | DIASTOLIC BLOOD PRESSURE: 73 MMHG

## 2020-06-22 PROCEDURE — 93000 ELECTROCARDIOGRAM COMPLETE: CPT

## 2020-06-22 PROCEDURE — 99214 OFFICE O/P EST MOD 30 MIN: CPT

## 2020-06-22 NOTE — DISCUSSION/SUMMARY
[___ Month(s)] : [unfilled] month(s) [With Me] : with me [FreeTextEntry1] : Mrs. Dalal is a 69 year old female with a history of an unclear idiopathic atrial arrhythmia, breast ca s/p surgery and radiation, here for follow up. \par Earlier in 2018, she was found to have atrial fibrillation and is now s/p AF ablation. Afterwards, she presented with a persistent tachycardia, and is now s/p ablation of a high darlyn/SVC AT.\par \par She is well today, though reports some dizziness when bending over and fatigue in the mornings. Her EKG continues to demonstrates a low atrial rhythm, though she is feeling much better. She is mildly orthostatic today, which may explain her symptoms. She will decrease her toprol to 12.5 daily at night. She is to stay hydrated and will monitor her BP when she does not feel well at home.\par \par She will continue Eliquis 5 mg po bid and will monitor for bleeding. \par \par She will continue to eat right and exercise. She is to get back to her normal life.  She will follow up with me in 3 months.\par \par

## 2020-06-22 NOTE — PHYSICAL EXAM
[General Appearance - Well Developed] : well developed [Normal Appearance] : normal appearance [Well Groomed] : well groomed [General Appearance - Well Nourished] : well nourished [No Deformities] : no deformities [General Appearance - In No Acute Distress] : no acute distress [Normal Conjunctiva] : the conjunctiva exhibited no abnormalities [Eyelids - No Xanthelasma] : the eyelids demonstrated no xanthelasmas [Normal Oral Mucosa] : normal oral mucosa [No Oral Pallor] : no oral pallor [No Oral Cyanosis] : no oral cyanosis [Normal Jugular Venous A Waves Present] : normal jugular venous A waves present [Normal Jugular Venous V Waves Present] : normal jugular venous V waves present [No Jugular Venous Lucero A Waves] : no jugular venous lucero A waves [Exaggerated Use Of Accessory Muscles For Inspiration] : no accessory muscle use [Respiration, Rhythm And Depth] : normal respiratory rhythm and effort [Auscultation Breath Sounds / Voice Sounds] : lungs were clear to auscultation bilaterally [Abdomen Soft] : soft [Abdomen Tenderness] : non-tender [Abdomen Mass (___ Cm)] : no abdominal mass palpated [Abnormal Walk] : normal gait [Gait - Sufficient For Exercise Testing] : the gait was sufficient for exercise testing [Nail Clubbing] : no clubbing of the fingernails [Cyanosis, Localized] : no localized cyanosis [Petechial Hemorrhages (___cm)] : no petechial hemorrhages [] : no rash [Skin Color & Pigmentation] : normal skin color and pigmentation [No Venous Stasis] : no venous stasis [Skin Lesions] : no skin lesions [No Skin Ulcers] : no skin ulcer [No Xanthoma] : no  xanthoma was observed [Affect] : the affect was normal [Oriented To Time, Place, And Person] : oriented to person, place, and time [Mood] : the mood was normal [No Anxiety] : not feeling anxious [Normal Rate] : normal [Normal S1] : normal S1 [Normal S2] : normal S2 [S3] : no S3 [S4] : no S4 [No Murmur] : no murmurs heard [Right Carotid Bruit] : no bruit heard over the right carotid [Left Carotid Bruit] : no bruit heard over the left carotid [Right Femoral Bruit] : no bruit heard over the right femoral artery [Left Femoral Bruit] : no bruit heard over the left femoral artery [2+] : left 2+ [No Pitting Edema] : no pitting edema present [No Abnormalities] : the abdominal aorta was not enlarged and no bruit was heard

## 2020-06-22 NOTE — HISTORY OF PRESENT ILLNESS
[FreeTextEntry1] : Mrs. Dalal is a 69 year old female with a history of an unclear idiopathic atrial arrhythmia, breast ca s/p surgery and radiation, here for follow up.\par \par Today, she is here for followup.  I last saw her 1/2020.\par \par She had an atrial fibrillation ablation on 11/28/2018. Her pulmonary veins were successfully isolated. 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 Despite flecainide and Toprol XL, she had persistent symptoms and resting tachycardia.\par \par She is now s/p ablation on 3/22/2019 of a high darlyn/SVC AT with sinus node modification. The SVC demonstrated automaticity at a rate faster than the atrium. After the procedure, her hr was in the 80's in an accelerated junctional rhythm. She was a little hypotensive post, and an echocardiogram was unremarkable.\par \par She is feeling well today. She reports some dizziness when bending over in the mornings. She feels weak sometimes though feels well overall.\par She has been taking Toprol XL 12.5 bid. \par She had an event monitor in 7/2019 that demonstrated an intermittent sinus rhythm, with short runs of SVT. There was nothing suggestive of atrial fibrillation.\par \par \par Prior:\par She was admitted to  on 7/12/2018 with palpitations. She was found to be in AF with RVR, and eventually converted back to SR with a cardizem gtt. She was unable to tolerate higher doses of Cardizem because of hypotension, and was sent home on Cardizem  mg daily. She was also started on a Eliquis 5 mg p.o. twice a day for CVA reduction.Echocardiogram was a difficult study, which showed normal LV systolic function and no significant valvular pathology.\par She was started on Toprol-XL 25 mg p.o. daily, and is currently taking it bid. She had an episode of palpitations in 8/2018 in [facility], and was found to be in SR at the ER.\par She had a 24 hour holter which showed APCs, and short runs of PAT (longest being 10 beats in duration at a HR of 124) but there was no evidence of atrial fibrillation.\par

## 2020-07-12 ENCOUNTER — RX RENEWAL (OUTPATIENT)
Age: 70
End: 2020-07-12

## 2020-09-21 ENCOUNTER — APPOINTMENT (OUTPATIENT)
Dept: CARDIOLOGY | Facility: CLINIC | Age: 70
End: 2020-09-21
Payer: MEDICARE

## 2020-09-21 ENCOUNTER — NON-APPOINTMENT (OUTPATIENT)
Age: 70
End: 2020-09-21

## 2020-09-21 VITALS
HEIGHT: 68 IN | BODY MASS INDEX: 22.43 KG/M2 | SYSTOLIC BLOOD PRESSURE: 124 MMHG | OXYGEN SATURATION: 100 % | HEART RATE: 82 BPM | DIASTOLIC BLOOD PRESSURE: 74 MMHG | WEIGHT: 148 LBS

## 2020-09-21 DIAGNOSIS — R94.31 ABNORMAL ELECTROCARDIOGRAM [ECG] [EKG]: ICD-10-CM

## 2020-09-21 DIAGNOSIS — R42 DIZZINESS AND GIDDINESS: ICD-10-CM

## 2020-09-21 PROCEDURE — 99214 OFFICE O/P EST MOD 30 MIN: CPT

## 2020-09-21 PROCEDURE — 93000 ELECTROCARDIOGRAM COMPLETE: CPT

## 2020-09-21 RX ORDER — ERYTHROMYCIN 5 MG/G
5 OINTMENT OPHTHALMIC
Qty: 4 | Refills: 0 | Status: ACTIVE | COMMUNITY
Start: 2020-05-22

## 2020-09-21 NOTE — DISCUSSION/SUMMARY
[___ Month(s)] : [unfilled] month(s) [With Me] : with me [FreeTextEntry1] : Mrs. Dalal is a 69 year old female with a history of an unclear idiopathic atrial arrhythmia, breast ca s/p surgery and radiation, here for follow up. \par Earlier in 2018, she was found to have atrial fibrillation and is now s/p AF ablation. Afterwards, she presented with a persistent tachycardia, and is now s/p ablation of a high darlyn/SVC AT.\par \par She is feeling much better today and has been back to exercise. Her EKG continues to demonstrates a low atrial rhythm. She will remain on toprol xl 12.5 daily.\par \par She will continue Eliquis 5 mg po bid and will monitor for bleeding. \par She will send me over her blood work when drawn at her oncologist office; her cholesterol has been elevated in the past. She will also have a carotid doppler given her prior episodes of dizziness.\par \par She will continue to eat right and exercise. She will follow up with me in 3-6 months. She knows to call with any issues or concerns.\par \par

## 2020-09-21 NOTE — HISTORY OF PRESENT ILLNESS
[FreeTextEntry1] : Mrs. Dalal is a 69 year old female with a history of an unclear idiopathic atrial arrhythmia, breast ca s/p surgery and radiation, here for follow up.\par \par Today, she is here for followup.  I last saw her 6/2020.\par \par She had an atrial fibrillation ablation on 11/28/2018. Her pulmonary veins were successfully isolated. 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 Despite flecainide and Toprol XL, she had persistent symptoms and resting tachycardia.\par \par She is now s/p ablation on 3/22/2019 of a high darlyn/SVC AT with sinus node modification. The SVC demonstrated automaticity at a rate faster than the atrium. After the procedure, her hr was in the 80's in an accelerated junctional rhythm. She was a little hypotensive post, and an echocardiogram was unremarkable.\par \par She is feeling well today. She is feeling much better. Her dizziness is resolved.\par She has been taking Toprol XL 12.5 at night \par She had an event monitor in 7/2019 that demonstrated an intermittent sinus rhythm, with short runs of SVT. There was nothing suggestive of atrial fibrillation.\par \par \par Prior:\par She was admitted to  on 7/12/2018 with palpitations. She was found to be in AF with RVR, and eventually converted back to SR with a cardizem gtt. She was unable to tolerate higher doses of Cardizem because of hypotension, and was sent home on Cardizem  mg daily. She was also started on a Eliquis 5 mg p.o. twice a day for CVA reduction.Echocardiogram was a difficult study, which showed normal LV systolic function and no significant valvular pathology.\par She was started on Toprol-XL 25 mg p.o. daily, and is currently taking it bid. She had an episode of palpitations in 8/2018 in [facility], and was found to be in SR at the ER.\par She had a 24 hour holter which showed APCs, and short runs of PAT (longest being 10 beats in duration at a HR of 124) but there was no evidence of atrial fibrillation.\par

## 2020-10-19 ENCOUNTER — APPOINTMENT (OUTPATIENT)
Dept: CARDIOLOGY | Facility: CLINIC | Age: 70
End: 2020-10-19
Payer: MEDICARE

## 2020-10-19 PROCEDURE — 93880 EXTRACRANIAL BILAT STUDY: CPT

## 2020-11-20 ENCOUNTER — NON-APPOINTMENT (OUTPATIENT)
Age: 70
End: 2020-11-20

## 2020-11-20 ENCOUNTER — APPOINTMENT (OUTPATIENT)
Dept: INTERNAL MEDICINE | Facility: CLINIC | Age: 70
End: 2020-11-20
Payer: MEDICARE

## 2020-11-20 VITALS
BODY MASS INDEX: 22.28 KG/M2 | HEART RATE: 84 BPM | SYSTOLIC BLOOD PRESSURE: 122 MMHG | HEIGHT: 68 IN | RESPIRATION RATE: 14 BRPM | TEMPERATURE: 97.3 F | OXYGEN SATURATION: 97 % | WEIGHT: 147 LBS | DIASTOLIC BLOOD PRESSURE: 76 MMHG

## 2020-11-20 DIAGNOSIS — I48.91 UNSPECIFIED ATRIAL FIBRILLATION: ICD-10-CM

## 2020-11-20 PROCEDURE — 90670 PCV13 VACCINE IM: CPT

## 2020-11-20 PROCEDURE — G0439: CPT

## 2020-11-20 PROCEDURE — G0009: CPT

## 2020-11-20 NOTE — HISTORY OF PRESENT ILLNESS
[de-identified] : History of afib- followed by cardiology and EPS\par History of breast cancer- followed by hem/onc\par History of thyroid nodules- recently discovered.

## 2020-11-20 NOTE — ASSESSMENT
[FreeTextEntry1] : thyroid nodule- head and neck referral\par Afib- follow up with card\par HCM- UTD \par check labs

## 2020-11-20 NOTE — HEALTH RISK ASSESSMENT
[Very Good] : ~his/her~  mood as very good [Patient reported mammogram was normal] : Patient reported mammogram was normal [Patient reported colonoscopy was abnormal] : Patient reported colonoscopy was abnormal [MammogramDate] : 12/19 [ColonoscopyDate] : 03/18 [ColonoscopyComments] : polkasey repeat 3/21

## 2020-11-30 LAB
25(OH)D3 SERPL-MCNC: 35.7 NG/ML
ALBUMIN SERPL ELPH-MCNC: 4.2 G/DL
ALP BLD-CCNC: 103 U/L
ALT SERPL-CCNC: 21 U/L
ANION GAP SERPL CALC-SCNC: 9 MMOL/L
APPEARANCE: CLEAR
AST SERPL-CCNC: 24 U/L
BACTERIA: NEGATIVE
BASOPHILS # BLD AUTO: 0.04 K/UL
BASOPHILS NFR BLD AUTO: 0.8 %
BILIRUB SERPL-MCNC: 0.5 MG/DL
BILIRUBIN URINE: NEGATIVE
BLOOD URINE: NEGATIVE
BUN SERPL-MCNC: 12 MG/DL
CALCIUM SERPL-MCNC: 9.1 MG/DL
CHLORIDE SERPL-SCNC: 103 MMOL/L
CHOLEST SERPL-MCNC: 216 MG/DL
CO2 SERPL-SCNC: 29 MMOL/L
COLOR: NORMAL
CREAT SERPL-MCNC: 0.75 MG/DL
EOSINOPHIL # BLD AUTO: 0.08 K/UL
EOSINOPHIL NFR BLD AUTO: 1.6 %
ESTIMATED AVERAGE GLUCOSE: 114 MG/DL
FOLATE SERPL-MCNC: >20 NG/ML
GLUCOSE QUALITATIVE U: NEGATIVE
GLUCOSE SERPL-MCNC: 89 MG/DL
HBA1C MFR BLD HPLC: 5.6 %
HCT VFR BLD CALC: 47.6 %
HDLC SERPL-MCNC: 79 MG/DL
HGB BLD-MCNC: 14.6 G/DL
HYALINE CASTS: 0 /LPF
IMM GRANULOCYTES NFR BLD AUTO: 0.2 %
KETONES URINE: NEGATIVE
LDLC SERPL CALC-MCNC: 117 MG/DL
LEUKOCYTE ESTERASE URINE: NEGATIVE
LYMPHOCYTES # BLD AUTO: 1.28 K/UL
LYMPHOCYTES NFR BLD AUTO: 26 %
MAN DIFF?: NORMAL
MCHC RBC-ENTMCNC: 27.8 PG
MCHC RBC-ENTMCNC: 30.7 GM/DL
MCV RBC AUTO: 90.5 FL
MICROSCOPIC-UA: NORMAL
MONOCYTES # BLD AUTO: 0.43 K/UL
MONOCYTES NFR BLD AUTO: 8.7 %
NEUTROPHILS # BLD AUTO: 3.09 K/UL
NEUTROPHILS NFR BLD AUTO: 62.7 %
NITRITE URINE: NEGATIVE
NONHDLC SERPL-MCNC: 137 MG/DL
PH URINE: 6.5
PLATELET # BLD AUTO: 199 K/UL
POTASSIUM SERPL-SCNC: 4.4 MMOL/L
PROT SERPL-MCNC: 6.4 G/DL
PROTEIN URINE: NEGATIVE
RBC # BLD: 5.26 M/UL
RBC # FLD: 13.5 %
RED BLOOD CELLS URINE: 1 /HPF
SODIUM SERPL-SCNC: 142 MMOL/L
SPECIFIC GRAVITY URINE: 1.01
SQUAMOUS EPITHELIAL CELLS: 2 /HPF
TRIGL SERPL-MCNC: 100 MG/DL
TSH SERPL-ACNC: 1.42 UIU/ML
UROBILINOGEN URINE: NORMAL
VIT B12 SERPL-MCNC: 723 PG/ML
WBC # FLD AUTO: 4.93 K/UL
WHITE BLOOD CELLS URINE: 0 /HPF

## 2020-12-08 DIAGNOSIS — E04.1 NONTOXIC SINGLE THYROID NODULE: ICD-10-CM

## 2020-12-10 ENCOUNTER — APPOINTMENT (OUTPATIENT)
Dept: SURGERY | Facility: CLINIC | Age: 70
End: 2020-12-10
Payer: MEDICARE

## 2020-12-10 VITALS
OXYGEN SATURATION: 97 % | HEIGHT: 68 IN | HEART RATE: 80 BPM | DIASTOLIC BLOOD PRESSURE: 70 MMHG | RESPIRATION RATE: 14 BRPM | SYSTOLIC BLOOD PRESSURE: 121 MMHG | BODY MASS INDEX: 22.28 KG/M2 | WEIGHT: 147 LBS

## 2020-12-10 PROCEDURE — 99204 OFFICE O/P NEW MOD 45 MIN: CPT

## 2020-12-10 NOTE — PHYSICAL EXAM
[Midline] : located in midline position [Normal] : orientation to person, place, and time: normal [de-identified] : Extremities: SILVA x 4.   Skin: No obvious skin lesions.   Voice: clear

## 2021-01-31 ENCOUNTER — OUTPATIENT (OUTPATIENT)
Dept: OUTPATIENT SERVICES | Facility: HOSPITAL | Age: 71
LOS: 1 days | End: 2021-01-31
Payer: MEDICARE

## 2021-01-31 ENCOUNTER — TRANSCRIPTION ENCOUNTER (OUTPATIENT)
Age: 71
End: 2021-01-31

## 2021-01-31 DIAGNOSIS — Z20.828 CONTACT WITH AND (SUSPECTED) EXPOSURE TO OTHER VIRAL COMMUNICABLE DISEASES: ICD-10-CM

## 2021-01-31 DIAGNOSIS — Z90.721 ACQUIRED ABSENCE OF OVARIES, UNILATERAL: Chronic | ICD-10-CM

## 2021-01-31 DIAGNOSIS — Z98.890 OTHER SPECIFIED POSTPROCEDURAL STATES: Chronic | ICD-10-CM

## 2021-01-31 LAB — SARS-COV-2 RNA SPEC QL NAA+PROBE: SIGNIFICANT CHANGE UP

## 2021-01-31 PROCEDURE — U0003: CPT

## 2021-01-31 PROCEDURE — U0005: CPT

## 2021-01-31 PROCEDURE — C9803: CPT

## 2021-02-01 DIAGNOSIS — Z20.828 CONTACT WITH AND (SUSPECTED) EXPOSURE TO OTHER VIRAL COMMUNICABLE DISEASES: ICD-10-CM

## 2021-03-20 ENCOUNTER — OUTPATIENT (OUTPATIENT)
Dept: OUTPATIENT SERVICES | Facility: HOSPITAL | Age: 71
LOS: 1 days | End: 2021-03-20
Payer: MEDICARE

## 2021-03-20 DIAGNOSIS — Z90.721 ACQUIRED ABSENCE OF OVARIES, UNILATERAL: Chronic | ICD-10-CM

## 2021-03-20 DIAGNOSIS — Z20.828 CONTACT WITH AND (SUSPECTED) EXPOSURE TO OTHER VIRAL COMMUNICABLE DISEASES: ICD-10-CM

## 2021-03-20 DIAGNOSIS — Z98.890 OTHER SPECIFIED POSTPROCEDURAL STATES: Chronic | ICD-10-CM

## 2021-03-20 LAB — SARS-COV-2 RNA SPEC QL NAA+PROBE: SIGNIFICANT CHANGE UP

## 2021-03-20 PROCEDURE — C9803: CPT

## 2021-03-20 PROCEDURE — U0005: CPT

## 2021-03-20 PROCEDURE — U0003: CPT

## 2021-03-21 DIAGNOSIS — Z20.828 CONTACT WITH AND (SUSPECTED) EXPOSURE TO OTHER VIRAL COMMUNICABLE DISEASES: ICD-10-CM

## 2021-04-07 NOTE — ED ADULT NURSE NOTE - CAS TRG GENERAL NORM CIRC DET
Strong peripheral pulses Information: Selecting Yes will display possible errors in your note based on the variables you have selected. This validation is only offered as a suggestion for you. PLEASE NOTE THAT THE VALIDATION TEXT WILL BE REMOVED WHEN YOU FINALIZE YOUR NOTE. IF YOU WANT TO FAX A PRELIMINARY NOTE YOU WILL NEED TO TOGGLE THIS TO 'NO' IF YOU DO NOT WANT IT IN YOUR FAXED NOTE.

## 2021-04-14 ENCOUNTER — APPOINTMENT (OUTPATIENT)
Dept: CARDIOLOGY | Facility: CLINIC | Age: 71
End: 2021-04-14
Payer: MEDICARE

## 2021-04-14 ENCOUNTER — NON-APPOINTMENT (OUTPATIENT)
Age: 71
End: 2021-04-14

## 2021-04-14 VITALS
HEART RATE: 65 BPM | HEIGHT: 68 IN | WEIGHT: 148 LBS | SYSTOLIC BLOOD PRESSURE: 112 MMHG | BODY MASS INDEX: 22.43 KG/M2 | OXYGEN SATURATION: 100 % | DIASTOLIC BLOOD PRESSURE: 76 MMHG

## 2021-04-14 PROCEDURE — 93000 ELECTROCARDIOGRAM COMPLETE: CPT

## 2021-04-14 PROCEDURE — 99214 OFFICE O/P EST MOD 30 MIN: CPT

## 2021-04-14 RX ORDER — ACYCLOVIR 400 MG/1
400 TABLET ORAL TWICE DAILY
Refills: 0 | Status: DISCONTINUED | COMMUNITY
End: 2021-04-14

## 2021-04-14 NOTE — DISCUSSION/SUMMARY
[With Me] : with me [___ Month(s)] : [unfilled] month(s) [FreeTextEntry1] : Mrs. Dalal is a 70 year old female with a history of an unclear idiopathic atrial arrhythmia, breast ca s/p surgery and radiation, here for follow up. \par Earlier in 2018, she was found to have atrial fibrillation and is now s/p AF ablation. Afterwards, she presented with a persistent tachycardia, and is now s/p ablation of a high darlyn/SVC AT.\par \par She is feeling much better today and has been back to exercise. Her EKG continues to demonstrates a low atrial rhythm. She will remain on toprol xl 12.5 daily.\par \par She will continue Eliquis 5 mg po bid and will monitor for bleeding. \par Her most recent LDL was 117, which is near goal.\par \par She will continue to eat right and exercise. She will follow up with me in 3-6 months. She knows to call with any issues or concerns.\par \par

## 2021-04-14 NOTE — HISTORY OF PRESENT ILLNESS
[FreeTextEntry1] : Mrs. Dalal is a 70 year old female with a history of an unclear idiopathic atrial arrhythmia, breast ca s/p surgery and radiation, here for follow up.\par \par Today, she is here for followup.  I last saw her 9/2020.\par \par She had an atrial fibrillation ablation on 11/28/2018. Her pulmonary veins were successfully isolated. 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 Despite flecainide and Toprol XL, she had persistent symptoms and resting tachycardia.\par \par She is now s/p ablation on 3/22/2019 of a high darlyn/SVC AT with sinus node modification. The SVC demonstrated automaticity at a rate faster than the atrium. After the procedure, her hr was in the 80's in an accelerated junctional rhythm. She was a little hypotensive post, and an echocardiogram was unremarkable.\par \par She is feeling well today. She is feeling much better. Her dizziness is resolved. She reports an episode of slightly faster heart rates a few days after getting the moderna vaccines.\par She has been taking Toprol XL 12.5 at night \par She had an event monitor in 7/2019 that demonstrated an intermittent sinus rhythm, with short runs of SVT. There was nothing suggestive of atrial fibrillation.\par \par \par Prior:\par She was admitted to  on 7/12/2018 with palpitations. She was found to be in AF with RVR, and eventually converted back to SR with a cardizem gtt. She was unable to tolerate higher doses of Cardizem because of hypotension, and was sent home on Cardizem  mg daily. She was also started on a Eliquis 5 mg p.o. twice a day for CVA reduction.Echocardiogram was a difficult study, which showed normal LV systolic function and no significant valvular pathology.\par She was started on Toprol-XL 25 mg p.o. daily, and is currently taking it bid. She had an episode of palpitations in 8/2018 in [facility], and was found to be in SR at the ER.\par She had a 24 hour holter which showed APCs, and short runs of PAT (longest being 10 beats in duration at a HR of 124) but there was no evidence of atrial fibrillation.\par

## 2021-04-14 NOTE — PHYSICAL EXAM
[General Appearance - Well Developed] : well developed [Normal Appearance] : normal appearance [Well Groomed] : well groomed [General Appearance - Well Nourished] : well nourished [No Deformities] : no deformities [General Appearance - In No Acute Distress] : no acute distress [Normal Conjunctiva] : the conjunctiva exhibited no abnormalities [Eyelids - No Xanthelasma] : the eyelids demonstrated no xanthelasmas [Normal Oral Mucosa] : normal oral mucosa [No Oral Pallor] : no oral pallor [No Oral Cyanosis] : no oral cyanosis [Normal Jugular Venous A Waves Present] : normal jugular venous A waves present [Normal Jugular Venous V Waves Present] : normal jugular venous V waves present [No Jugular Venous Lucero A Waves] : no jugular venous lucero A waves [Respiration, Rhythm And Depth] : normal respiratory rhythm and effort [Exaggerated Use Of Accessory Muscles For Inspiration] : no accessory muscle use [Auscultation Breath Sounds / Voice Sounds] : lungs were clear to auscultation bilaterally [Abdomen Soft] : soft [Abdomen Tenderness] : non-tender [Abdomen Mass (___ Cm)] : no abdominal mass palpated [Abnormal Walk] : normal gait [Gait - Sufficient For Exercise Testing] : the gait was sufficient for exercise testing [Nail Clubbing] : no clubbing of the fingernails [Cyanosis, Localized] : no localized cyanosis [Petechial Hemorrhages (___cm)] : no petechial hemorrhages [Skin Color & Pigmentation] : normal skin color and pigmentation [] : no rash [No Venous Stasis] : no venous stasis [Skin Lesions] : no skin lesions [No Skin Ulcers] : no skin ulcer [No Xanthoma] : no  xanthoma was observed [Oriented To Time, Place, And Person] : oriented to person, place, and time [Affect] : the affect was normal [Mood] : the mood was normal [No Anxiety] : not feeling anxious [Normal Rate] : normal [Normal S1] : normal S1 [Normal S2] : normal S2 [No Murmur] : no murmurs heard [2+] : left 2+ [No Abnormalities] : the abdominal aorta was not enlarged and no bruit was heard [No Pitting Edema] : no pitting edema present [S3] : no S3 [S4] : no S4 [Right Carotid Bruit] : no bruit heard over the right carotid [Left Carotid Bruit] : no bruit heard over the left carotid [Right Femoral Bruit] : no bruit heard over the right femoral artery [Left Femoral Bruit] : no bruit heard over the left femoral artery

## 2021-05-13 ENCOUNTER — APPOINTMENT (OUTPATIENT)
Dept: SURGERY | Facility: CLINIC | Age: 71
End: 2021-05-13
Payer: MEDICARE

## 2021-05-13 VITALS
OXYGEN SATURATION: 97 % | DIASTOLIC BLOOD PRESSURE: 81 MMHG | HEART RATE: 84 BPM | HEIGHT: 68 IN | SYSTOLIC BLOOD PRESSURE: 127 MMHG | BODY MASS INDEX: 22.13 KG/M2 | WEIGHT: 146 LBS

## 2021-05-13 PROCEDURE — 99212 OFFICE O/P EST SF 10 MIN: CPT

## 2021-05-13 NOTE — PHYSICAL EXAM
[Midline] : located in midline position [Normal] : orientation to person, place, and time: normal [de-identified] : Extremities: SILVA x 4.   Skin: No obvious skin lesions.   Voice: clear

## 2021-06-17 ENCOUNTER — APPOINTMENT (OUTPATIENT)
Dept: OBGYN | Facility: CLINIC | Age: 71
End: 2021-06-17
Payer: MEDICARE

## 2021-06-17 DIAGNOSIS — R10.2 PELVIC AND PERINEAL PAIN: ICD-10-CM

## 2021-06-17 LAB
BILIRUB UR QL STRIP: NORMAL
CLARITY UR: CLEAR
COLLECTION METHOD: NORMAL
GLUCOSE UR-MCNC: NORMAL
HCG UR QL: 0.2 EU/DL
HGB UR QL STRIP.AUTO: NORMAL
KETONES UR-MCNC: NORMAL
LEUKOCYTE ESTERASE UR QL STRIP: NORMAL
NITRITE UR QL STRIP: NORMAL
PH UR STRIP: 7
PROT UR STRIP-MCNC: NORMAL
SP GR UR STRIP: 1.02

## 2021-06-17 PROCEDURE — 99213 OFFICE O/P EST LOW 20 MIN: CPT | Mod: 25

## 2021-06-17 PROCEDURE — 81003 URINALYSIS AUTO W/O SCOPE: CPT | Mod: QW

## 2021-06-17 PROCEDURE — G0328 FECAL BLOOD SCRN IMMUNOASSAY: CPT | Mod: QW

## 2021-06-19 LAB — BACTERIA UR CULT: NORMAL

## 2021-07-08 NOTE — DISCHARGE NOTE ADULT - HOSPITAL COURSE
68 y/o F PMHX idiopathic ventricular tachycardia, breast cancer 9/2016 s/p surgery and radiation, chronic fatigue syndrome, degenerative disc disease, genital herpes, hiatal hernia presents with palpitations x2 days. Patient states the episodes of palpitations have been intermittent. Reports feeling her heart pounding against her chest last a few minutes at a time and occur at rest and movement. These episodes are similar to the patient's episode of idiopathic v tach about 10 years ago. At that time, patient experienced palpitations, but converted back to sinus. Patient states she has had these episodes on and off through the years, but they are becoming more frequent. Last episode was in 1/2018. At that time, patient was placed on a holter monitor for 30 days without any events. (+) chronic hot flashes. Denies fever, chills, SOB, CP, cough, abd pain, N/V/D, or urinary symptoms. No recent travel. No sick contacts. No change in weight.    In the ED, VS: afebrile, , /90, RR 18, sat 100% RA, cbc unremarkable, D-dimer neg, cmp unremarkable except for gluc 140, neg cardiac enzymes, EKG afib with RVR @ 173BPM. Received IV cardizem 10mg x2 and started on cardizem infusion. Cardio contacted in the ED. CXR prelim neg. In ED, patient became SOB and 's while on cardizem drip @ 15. Repeat ekg similar to prior - afib @ 130s. Called Dr. Myers - likely anxiety. Gave flexeril for anxiety (patient unable to take benzos).     Patient admitted to telemetry for new onset atrial fibrillation.  Dr. Shipley, cardiology, consulted given new onset a. fib and patient was placed on Cardizem 30mg q6, and placed on lovenox for anticoagulation.  Patient converted to sinus rhythm.  TTE was performed to rule out effusion or valvular disease, which showed normal LV function, EF 65% w/ mild MR.    Patient seen and examined on day discharge and deemed stable for discharge w/ outpatient follow up to her cardiologist Dr. Brown, on cardizem  daily and eliquis for anticoagulation. 68 y/o F PMHX idiopathic ventricular tachycardia, breast cancer 9/2016 s/p surgery and radiation, chronic fatigue syndrome, degenerative disc disease, genital herpes, hiatal hernia presents with palpitations x2 days. Patient states the episodes of palpitations have been intermittent. Reports feeling her heart pounding against her chest last a few minutes at a time and occur at rest and movement. These episodes are similar to the patient's episode of idiopathic v tach about 10 years ago. At that time, patient experienced palpitations, but converted back to sinus. Patient states she has had these episodes on and off through the years, but they are becoming more frequent. Last episode was in 1/2018. At that time, patient was placed on a holter monitor for 30 days without any events. (+) chronic hot flashes. Denies fever, chills, SOB, CP, cough, abd pain, N/V/D, or urinary symptoms. No recent travel. No sick contacts. No change in weight.    In the ED, VS: afebrile, , /90, RR 18, sat 100% RA, cbc unremarkable, D-dimer neg, cmp unremarkable except for gluc 140, neg cardiac enzymes, EKG afib with RVR @ 173BPM. Received IV cardizem 10mg x2 and started on Cardizem  IV fusion. Cardio contacted in the ED. CXR prelim neg. In ED, patient became SOB and 's while on Cardizem drip @ 15. Repeat ekg similar to prior - afib @ 130s. likely anxiety. Gave flexeril for anxiety (patient unable to take benzo     Patient admitted to telemetry for new onset atrial fibrillation.  Over night pt  converted back to NSR, pt remains on SC Lovenox 60 mg q 12 hrs for AC as per Cardiology,   Dr. Shipley, cardiology, consulted given new onset a. fib ,Cardizem drip was d/montse as Low BP, patient was started on Cardizem 30mg q6, and  on Lovenox for anticoagulation.  TTE was performed to rule out effusion or valvular disease, which showed normal LV function, EF 65% w/ mild MR. AC changed to PO Eliquis 5 mg 2x day as per Dr Shipley after ECHO done.    Patient seen and examined on day discharge and deemed stable for discharge w/ outpatient follow up to her cardiologist Dr. Brown, on Cardizem  daily and Eliquis 5 mg 2x day for anticoagulation as per Cardiology, Pt to follow up with PMD & Cardiology upon D/C. (4) excellent

## 2021-07-09 ENCOUNTER — RX RENEWAL (OUTPATIENT)
Age: 71
End: 2021-07-09

## 2021-07-13 NOTE — PATIENT PROFILE ADULT. - FUNCTIONAL SCREEN CURRENT LEVEL: DRESSING, MLM
Please read the healthy family handout that you were given and share it with your family. Please compare this printed medication list with your medications at home to be sure they are the same. If you have any medications that are different please contact us immediately at [phone]. Also review your allergies that we have listed, these may also include medications that you have not been able to tolerate, make sure everything listed is correct. If you have any allergies that are different please contact us immediately at [phone].
(0) independent

## 2021-07-14 ENCOUNTER — APPOINTMENT (OUTPATIENT)
Dept: OBGYN | Facility: CLINIC | Age: 71
End: 2021-07-14
Payer: MEDICARE

## 2021-07-14 ENCOUNTER — ASOB RESULT (OUTPATIENT)
Age: 71
End: 2021-07-14

## 2021-07-14 PROCEDURE — 76830 TRANSVAGINAL US NON-OB: CPT

## 2021-07-27 ENCOUNTER — TRANSCRIPTION ENCOUNTER (OUTPATIENT)
Age: 71
End: 2021-07-27

## 2021-07-28 NOTE — PATIENT PROFILE ADULT. - EXTENSIONS OF SELF_ADULT
MDO for POLST. No Adv Dir are on file, pt has MDO for DNR/Select. CM notified MD/RN that once MD sections A & E are completed then the  RN/SW/CM can witness pt signature to complete the POLST document.     CM notified by [facility] liaison that a bed is no lo ins auth. CM sent rehab notes from 7/26. CM requested updated notes for today from PT/OT to start auth.    7/30 0900  CM rec;d vm from dtr stating she agreeable to [facility].  Cm lvm for Ngoc to confirm ins Delbert Cadena has been started, pt is dc ready Eyeglasses

## 2021-08-09 ENCOUNTER — APPOINTMENT (OUTPATIENT)
Dept: UROGYNECOLOGY | Facility: CLINIC | Age: 71
End: 2021-08-09
Payer: MEDICARE

## 2021-08-09 VITALS
HEART RATE: 76 BPM | HEIGHT: 68 IN | WEIGHT: 146 LBS | SYSTOLIC BLOOD PRESSURE: 125 MMHG | BODY MASS INDEX: 22.13 KG/M2 | TEMPERATURE: 97.7 F | DIASTOLIC BLOOD PRESSURE: 86 MMHG

## 2021-08-09 DIAGNOSIS — R35.0 FREQUENCY OF MICTURITION: ICD-10-CM

## 2021-08-09 DIAGNOSIS — N39.41 URGE INCONTINENCE: ICD-10-CM

## 2021-08-09 DIAGNOSIS — R39.15 URGENCY OF URINATION: ICD-10-CM

## 2021-08-09 LAB
BILIRUB UR QL STRIP: NORMAL
CLARITY UR: CLEAR
COLLECTION METHOD: NORMAL
GLUCOSE UR-MCNC: NORMAL
HCG UR QL: 0.2 EU/DL
HGB UR QL STRIP.AUTO: NORMAL
KETONES UR-MCNC: NORMAL
LEUKOCYTE ESTERASE UR QL STRIP: NORMAL
NITRITE UR QL STRIP: NORMAL
PH UR STRIP: 5
PROT UR STRIP-MCNC: NORMAL
SP GR UR STRIP: 1.02

## 2021-08-09 PROCEDURE — 51701 INSERT BLADDER CATHETER: CPT

## 2021-08-09 PROCEDURE — 99214 OFFICE O/P EST MOD 30 MIN: CPT | Mod: 25

## 2021-08-09 PROCEDURE — 99204 OFFICE O/P NEW MOD 45 MIN: CPT | Mod: 25

## 2021-08-09 NOTE — HISTORY OF PRESENT ILLNESS
[Unable To Restrain Bowel Movement] : mild [Feelings Of Urinary Urgency] : moderate [Urinary Tract Infection] : moderate [] : months ago [Rectal Prolapse] : mild [de-identified] : 1-2 episodes in lifetime [de-identified] : 1-3 times a night [FreeTextEntry1] : Darya is a 71 yo who presents for prolapse. \par \par PMHx: NSVDx 1, Breast Ca s/p lumpectomy, Afib, IBS, Conspitation\par PSHx: BSO, cardiac ablations\par SHx: , nonsmoker\par \par 32 oz decaf tea, 32 oz water, 12 oz coke, employed as dentist

## 2021-08-09 NOTE — PHYSICAL EXAM
[Chaperone Present] : A chaperone was present in the examining room during all aspects of the physical examination [Labia Majora] : were normal [Labia Minora] : were normal [Normal Appearance] : general appearance was normal [Atrophy] : atrophy [Exam Deferred] : was deferred [Normal] : normal [Aa ____] : Aa [unfilled] [Ba ____] : Ba [unfilled] [FreeTextEntry1] : General: Well, appearing. Alert and orientated. No acute distress\par HEENT: Normocephalic, atraumatic and extraocular muscles appear to be intact \par Neck: Full range of motion, no obvious lymphadenopathy, deformities, or masses noted \par Respiratory: Speaking in full sentences comfortably, normal work of breathing and no cough during visit\par Musculoskeletal: active full range of motion in extremities \par Extremities: No upper extremity edema noted\par Skin: no obvious rash or skin lesions\par Neuro: Orientated X 3, speech is fluent, normal rate\par Psych: Normal mood and affect\par  [Tenderness] : ~T no ~M abdominal tenderness observed

## 2021-08-09 NOTE — DISCUSSION/SUMMARY
[FreeTextEntry1] : Computer generated images were used to demonstrate her prolapse as well as explain treatment options. We reviewed management options for her prolapse including: observation, pelvic floor exercises with and without PT, pessary, and surgical management. She would like to try pelvic floor PT, Rx provided with contact information for local providers. Written instructions on how to perform the exercises were also provided to her. For her urinary symptoms we reviewed behavioral and fluid modifications. \par \par She will RTO as needed.

## 2021-08-09 NOTE — PROCEDURE
[Fluid Management] : fluid management [FreeTextEntry1] : Sterile straight catheterization was performed to measure a postvoid residual volume and rule out urinary tract infection which was 60 cc\par

## 2021-09-24 DIAGNOSIS — R00.2 PALPITATIONS: ICD-10-CM

## 2021-10-14 ENCOUNTER — APPOINTMENT (OUTPATIENT)
Dept: CARDIOLOGY | Facility: CLINIC | Age: 71
End: 2021-10-14
Payer: MEDICARE

## 2021-10-14 ENCOUNTER — NON-APPOINTMENT (OUTPATIENT)
Age: 71
End: 2021-10-14

## 2021-10-14 VITALS
HEART RATE: 69 BPM | HEIGHT: 68 IN | OXYGEN SATURATION: 99 % | WEIGHT: 149 LBS | DIASTOLIC BLOOD PRESSURE: 72 MMHG | SYSTOLIC BLOOD PRESSURE: 107 MMHG | BODY MASS INDEX: 22.58 KG/M2

## 2021-10-14 PROCEDURE — 99214 OFFICE O/P EST MOD 30 MIN: CPT

## 2021-10-14 PROCEDURE — 93000 ELECTROCARDIOGRAM COMPLETE: CPT

## 2021-10-14 NOTE — DISCUSSION/SUMMARY
[With Me] : with me [___ Month(s)] : in [unfilled] month(s) [FreeTextEntry1] : Mrs. Dalal is a 70 year old female with a history of an unclear idiopathic atrial arrhythmia, breast ca s/p surgery and radiation, here for follow up. \par Earlier in 2018, she was found to have atrial fibrillation and is now s/p AF ablation. Afterwards, she presented with a persistent tachycardia, and is now s/p ablation of a high darlyn/SVC AT.\par \par She is feeling much better today and has been back to exercise. Her EKG continues to demonstrates a low atrial rhythm. She will remain on toprol xl 12.5 daily.\par \par She will continue Eliquis 5 mg po bid and will monitor for bleeding. She would really like to stop Eliquis, and I have offered long term monitoring (i.e. an ILR) to prove that she is not having any asymptomatic PAF (she does have intermittent dizziness here and there), prior to safely stopping her eliquis.\par Her most recent LDL was 117, which is near goal. She is having repeat BW with her PCP this year.\par  \par She will continue to eat right and exercise. She will follow up with me in 3-6 months. She knows to call with any issues or concerns.\par \par

## 2021-11-03 ENCOUNTER — APPOINTMENT (OUTPATIENT)
Dept: OBGYN | Facility: CLINIC | Age: 71
End: 2021-11-03
Payer: MEDICARE

## 2021-11-03 VITALS
SYSTOLIC BLOOD PRESSURE: 120 MMHG | WEIGHT: 146 LBS | HEIGHT: 68 IN | BODY MASS INDEX: 22.13 KG/M2 | DIASTOLIC BLOOD PRESSURE: 82 MMHG

## 2021-11-03 PROCEDURE — G0101: CPT | Mod: GA

## 2021-11-03 PROCEDURE — G0328 FECAL BLOOD SCRN IMMUNOASSAY: CPT | Mod: QW

## 2021-11-04 LAB — HPV HIGH+LOW RISK DNA PNL CVX: NOT DETECTED

## 2021-11-04 NOTE — ED PROVIDER NOTE - CROS ED NEURO POS
Vaccine Information Statement(s) or the Emergency Use Authorization was given today. This has been reviewed, questions answered, and verbal consent given by Parent for injection(s) and administration of Influenza (Inactivated).      Patient tolerated without incident. See immunization grid for documentation.          
HEADACHE

## 2021-11-10 LAB — CYTOLOGY CVX/VAG DOC THIN PREP: ABNORMAL

## 2021-12-01 ENCOUNTER — APPOINTMENT (OUTPATIENT)
Dept: INTERNAL MEDICINE | Facility: CLINIC | Age: 71
End: 2021-12-01
Payer: MEDICARE

## 2021-12-01 VITALS
WEIGHT: 149 LBS | HEART RATE: 72 BPM | DIASTOLIC BLOOD PRESSURE: 80 MMHG | OXYGEN SATURATION: 99 % | RESPIRATION RATE: 14 BRPM | SYSTOLIC BLOOD PRESSURE: 126 MMHG | HEIGHT: 68 IN | BODY MASS INDEX: 22.58 KG/M2

## 2021-12-01 DIAGNOSIS — Z23 ENCOUNTER FOR IMMUNIZATION: ICD-10-CM

## 2021-12-01 LAB
25(OH)D3 SERPL-MCNC: 47 NG/ML
ALBUMIN SERPL ELPH-MCNC: 4.6 G/DL
ALP BLD-CCNC: 106 U/L
ALT SERPL-CCNC: 18 U/L
ANION GAP SERPL CALC-SCNC: 12 MMOL/L
APPEARANCE: CLEAR
AST SERPL-CCNC: 23 U/L
BACTERIA: NEGATIVE
BASOPHILS # BLD AUTO: 0.04 K/UL
BASOPHILS NFR BLD AUTO: 0.7 %
BILIRUB SERPL-MCNC: 0.5 MG/DL
BILIRUBIN URINE: NEGATIVE
BLOOD URINE: NEGATIVE
BUN SERPL-MCNC: 11 MG/DL
CALCIUM SERPL-MCNC: 9.3 MG/DL
CHLORIDE SERPL-SCNC: 103 MMOL/L
CHOLEST SERPL-MCNC: 226 MG/DL
CO2 SERPL-SCNC: 28 MMOL/L
COLOR: YELLOW
CREAT SERPL-MCNC: 0.73 MG/DL
EOSINOPHIL # BLD AUTO: 0.07 K/UL
EOSINOPHIL NFR BLD AUTO: 1.3 %
ESTIMATED AVERAGE GLUCOSE: 117 MG/DL
FOLATE SERPL-MCNC: >20 NG/ML
GLUCOSE QUALITATIVE U: NEGATIVE
GLUCOSE SERPL-MCNC: 85 MG/DL
HBA1C MFR BLD HPLC: 5.7 %
HCT VFR BLD CALC: 47.1 %
HDLC SERPL-MCNC: 86 MG/DL
HGB BLD-MCNC: 14.7 G/DL
HYALINE CASTS: 1 /LPF
IMM GRANULOCYTES NFR BLD AUTO: 0.2 %
KETONES URINE: NEGATIVE
LDLC SERPL CALC-MCNC: 116 MG/DL
LEUKOCYTE ESTERASE URINE: NEGATIVE
LYMPHOCYTES # BLD AUTO: 1.69 K/UL
LYMPHOCYTES NFR BLD AUTO: 30.8 %
MAN DIFF?: NORMAL
MCHC RBC-ENTMCNC: 27.3 PG
MCHC RBC-ENTMCNC: 31.2 GM/DL
MCV RBC AUTO: 87.5 FL
MICROSCOPIC-UA: NORMAL
MONOCYTES # BLD AUTO: 0.41 K/UL
MONOCYTES NFR BLD AUTO: 7.5 %
NEUTROPHILS # BLD AUTO: 3.27 K/UL
NEUTROPHILS NFR BLD AUTO: 59.5 %
NITRITE URINE: NEGATIVE
NONHDLC SERPL-MCNC: 140 MG/DL
PH URINE: 7.5
PLATELET # BLD AUTO: 192 K/UL
POTASSIUM SERPL-SCNC: 4.2 MMOL/L
PROT SERPL-MCNC: 7.2 G/DL
PROTEIN URINE: NORMAL
RBC # BLD: 5.38 M/UL
RBC # FLD: 14.1 %
RED BLOOD CELLS URINE: 2 /HPF
SODIUM SERPL-SCNC: 142 MMOL/L
SPECIFIC GRAVITY URINE: 1.02
SQUAMOUS EPITHELIAL CELLS: 2 /HPF
TRIGL SERPL-MCNC: 120 MG/DL
TSH SERPL-ACNC: 1.24 UIU/ML
UROBILINOGEN URINE: NORMAL
VIT B12 SERPL-MCNC: 995 PG/ML
WBC # FLD AUTO: 5.49 K/UL
WHITE BLOOD CELLS URINE: 1 /HPF

## 2021-12-01 PROCEDURE — G0009: CPT

## 2021-12-01 PROCEDURE — G0439: CPT

## 2021-12-01 PROCEDURE — 90732 PPSV23 VACC 2 YRS+ SUBQ/IM: CPT

## 2021-12-01 NOTE — HEALTH RISK ASSESSMENT
[Very Good] : ~his/her~  mood as very good [No falls in past year] : Patient reported no falls in the past year [0] : 2) Feeling down, depressed, or hopeless: Not at all (0) [Patient reported mammogram was normal] : Patient reported mammogram was normal [Patient reported colonoscopy was normal] : Patient reported colonoscopy was normal [MammogramDate] : 12/20 [ColonoscopyDate] : 09/21 [ColonoscopyComments] : repeat in 5 years

## 2022-01-03 ENCOUNTER — APPOINTMENT (OUTPATIENT)
Dept: CARDIOLOGY | Facility: CLINIC | Age: 72
End: 2022-01-03
Payer: MEDICARE

## 2022-01-03 ENCOUNTER — RX RENEWAL (OUTPATIENT)
Age: 72
End: 2022-01-03

## 2022-01-03 ENCOUNTER — NON-APPOINTMENT (OUTPATIENT)
Age: 72
End: 2022-01-03

## 2022-01-03 VITALS
HEIGHT: 68 IN | OXYGEN SATURATION: 98 % | WEIGHT: 149 LBS | HEART RATE: 76 BPM | SYSTOLIC BLOOD PRESSURE: 147 MMHG | DIASTOLIC BLOOD PRESSURE: 75 MMHG | BODY MASS INDEX: 22.58 KG/M2

## 2022-01-03 PROCEDURE — 93000 ELECTROCARDIOGRAM COMPLETE: CPT

## 2022-01-03 PROCEDURE — 99214 OFFICE O/P EST MOD 30 MIN: CPT

## 2022-01-03 NOTE — DISCUSSION/SUMMARY
[With Me] : with me [___ Month(s)] : in [unfilled] month(s) [FreeTextEntry1] : Mrs. Dalal is a 71 year old female with a history of an unclear idiopathic atrial arrhythmia, breast ca s/p surgery and radiation, here for follow up. In 2018, she was found to have atrial fibrillation and is now s/p AF ablation. Afterwards, she presented with a persistent tachycardia, and is now s/p ablation of a high darlyn/SVC AT.\par \par She is feeling off today with episodes of breathlessness. Her EKG continues to demonstrates a low atrial rhythm. Her BP is at goal.\par \par In the setting of her symptoms, I am going to set her up for a 2d echocardiogram and an exercise stress test. She will also be set up for a 2 week monitor when she returns from [place]. \par  \par She will continue Eliquis 5 mg po bid and will monitor for bleeding.She will remain on Toprol Xl 12.5\par Her most recent LDL was 116, which is near goal.\par  \par She will continue to eat right and exercise. She knows to call with any issues or concerns. We will speak after the above tests and arrange follow up.\par

## 2022-01-03 NOTE — HISTORY OF PRESENT ILLNESS
[FreeTextEntry1] : Mrs. Dalal is a 71 year old female with a history of an unclear idiopathic atrial arrhythmia, breast ca s/p surgery and radiation, here for follow up.\par \par Today, she is here for followup.  I last saw her 10/2021\par \par She had an atrial fibrillation ablation on 11/28/2018. Her pulmonary veins were successfully isolated. 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 Despite flecainide and Toprol XL, she had persistent symptoms and resting tachycardia.\par \par She is now s/p ablation on 3/22/2019 of a high darlyn/SVC AT with sinus node modification. The SVC demonstrated automaticity at a rate faster than the atrium. After the procedure, her hr was in the 80's in an accelerated junctional rhythm. She was a little hypotensive post, and an echocardiogram was unremarkable.\par \par She reports feeling more breathless as of late. She feels a little short of breath when going up steps at times. She has no chest pain. She does have palpitations at times as well.\par She did an EKG on her Kardia when she felt funny, and the majority of the strips demonstrates a regular rhythm with artifact. There is one strip where there could be non-conducted P waves, though significant artifact limits interpretation.\par \par She has been taking Toprol XL 12.5 at night \par \par Prior:\par She was admitted to  on 7/12/2018 with palpitations. She was found to be in AF with RVR, and eventually converted back to SR with a cardizem gtt. She was unable to tolerate higher doses of Cardizem because of hypotension, and was sent home on Cardizem  mg daily. She was also started on a Eliquis 5 mg p.o. twice a day for CVA reduction.Echocardiogram was a difficult study, which showed normal LV systolic function and no significant valvular pathology.\par She was started on Toprol-XL 25 mg p.o. daily, and is currently taking it bid. She had an episode of palpitations in 8/2018 in [facility], and was found to be in SR at the ER.\par She had a 24 hour holter which showed APCs, and short runs of PAT (longest being 10 beats in duration at a HR of 124) but there was no evidence of atrial fibrillation.\par

## 2022-01-03 NOTE — REVIEW OF SYSTEMS
[Dizziness] : dizziness [Negative] : Heme/Lymph [Dyspnea on exertion] : dyspnea during exertion [Palpitations] : palpitations

## 2022-01-05 ENCOUNTER — APPOINTMENT (OUTPATIENT)
Dept: CARDIOLOGY | Facility: CLINIC | Age: 72
End: 2022-01-05
Payer: MEDICARE

## 2022-01-05 ENCOUNTER — MED ADMIN CHARGE (OUTPATIENT)
Age: 72
End: 2022-01-05

## 2022-01-05 PROCEDURE — 93015 CV STRESS TEST SUPVJ I&R: CPT

## 2022-01-05 PROCEDURE — 93306 TTE W/DOPPLER COMPLETE: CPT

## 2022-01-05 RX ORDER — PERFLUTREN 6.52 MG/ML
6.52 INJECTION, SUSPENSION INTRAVENOUS
Qty: 2 | Refills: 0 | Status: COMPLETED | OUTPATIENT
Start: 2022-01-05

## 2022-01-05 RX ADMIN — PERFLUTREN MG/ML: 6.52 INJECTION, SUSPENSION INTRAVENOUS at 00:00

## 2022-01-06 ENCOUNTER — APPOINTMENT (OUTPATIENT)
Dept: OBGYN | Facility: CLINIC | Age: 72
End: 2022-01-06

## 2022-03-16 ENCOUNTER — NON-APPOINTMENT (OUTPATIENT)
Age: 72
End: 2022-03-16

## 2022-04-28 NOTE — ED ADULT NURSE NOTE - JUGULAR VENOUS DISTENTION
Called patient to schedule follow up from hospital visit.  Cannot lvm due to vm box is full  Cannot lvm absent

## 2022-05-19 ENCOUNTER — APPOINTMENT (OUTPATIENT)
Dept: SURGERY | Facility: CLINIC | Age: 72
End: 2022-05-19
Payer: MEDICARE

## 2022-05-19 PROCEDURE — 99212 OFFICE O/P EST SF 10 MIN: CPT

## 2022-05-19 NOTE — PHYSICAL EXAM
[Midline] : located in midline position [Normal] : orientation to person, place, and time: normal [de-identified] : Extremities: SILVA x 4.   Skin: No obvious skin lesions.   Voice: clear Declines

## 2022-06-01 ENCOUNTER — APPOINTMENT (OUTPATIENT)
Dept: OBGYN | Facility: CLINIC | Age: 72
End: 2022-06-01
Payer: MEDICARE

## 2022-06-01 PROCEDURE — 77080 DXA BONE DENSITY AXIAL: CPT

## 2022-07-06 ENCOUNTER — RX RENEWAL (OUTPATIENT)
Age: 72
End: 2022-07-06

## 2022-07-13 ENCOUNTER — NON-APPOINTMENT (OUTPATIENT)
Age: 72
End: 2022-07-13

## 2022-07-13 ENCOUNTER — APPOINTMENT (OUTPATIENT)
Dept: CARDIOLOGY | Facility: CLINIC | Age: 72
End: 2022-07-13

## 2022-07-13 VITALS
WEIGHT: 150 LBS | BODY MASS INDEX: 22.73 KG/M2 | HEART RATE: 62 BPM | HEIGHT: 68 IN | OXYGEN SATURATION: 99 % | SYSTOLIC BLOOD PRESSURE: 116 MMHG | DIASTOLIC BLOOD PRESSURE: 76 MMHG

## 2022-07-13 DIAGNOSIS — R06.00 DYSPNEA, UNSPECIFIED: ICD-10-CM

## 2022-07-13 PROCEDURE — 93000 ELECTROCARDIOGRAM COMPLETE: CPT

## 2022-07-13 PROCEDURE — 99214 OFFICE O/P EST MOD 30 MIN: CPT

## 2022-07-13 NOTE — HISTORY OF PRESENT ILLNESS
[FreeTextEntry1] : Mrs. Dalal is a 71 year old female with a history of an unclear idiopathic atrial arrhythmia, breast ca s/p surgery and radiation, here for follow up.\par \par Today, she is here for followup.  I last saw her 1/22.\par \par She had an atrial fibrillation ablation on 11/28/2018. Her pulmonary veins were successfully isolated. 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 Despite flecainide and Toprol XL, she had persistent symptoms and resting tachycardia.\par \par She is now s/p ablation on 3/22/2019 of a high darlyn/SVC AT with sinus node modification. The SVC demonstrated automaticity at a rate faster than the atrium. After the procedure, her hr was in the 80's in an accelerated junctional rhythm. She was a little hypotensive post, and an echocardiogram was unremarkable.\par \par She was a little short of breath at last visit. Echo with normal LV function, and no valvular pathology as of 1/2022. She has a stress test as well. She exercised for 3 min, though only 73% of max predicated HR was achieved, without ekg changes. \par Fortunately, she is doing much better today.\par She has been taking Toprol XL 12.5 at night \par \par Prior:\par She was admitted to  on 7/12/2018 with palpitations. She was found to be in AF with RVR, and eventually converted back to SR with a cardizem gtt. She was unable to tolerate higher doses of Cardizem because of hypotension, and was sent home on Cardizem  mg daily. She was also started on a Eliquis 5 mg p.o. twice a day for CVA reduction.Echocardiogram was a difficult study, which showed normal LV systolic function and no significant valvular pathology.\par She was started on Toprol-XL 25 mg p.o. daily, and is currently taking it bid. She had an episode of palpitations in 8/2018 in [facility], and was found to be in SR at the ER.\par She had a 24 hour holter which showed APCs, and short runs of PAT (longest being 10 beats in duration at a HR of 124) but there was no evidence of atrial fibrillation.\par

## 2022-07-13 NOTE — DISCUSSION/SUMMARY
[With Me] : with me [___ Month(s)] : in [unfilled] month(s) [FreeTextEntry1] : Mrs. Dalal is a 71 year old female with a history of an unclear idiopathic atrial arrhythmia, breast ca s/p surgery and radiation, here for follow up. In 2018, she was found to have atrial fibrillation and is now s/p AF ablation. Afterwards, she presented with a persistent tachycardia, and is now s/p ablation of a high darlyn/SVC AT.\par \par She is feeling well today, and her breathing is better. Her EKG continues to demonstrates a low atrial rhythm. Her BP is at goal. Echo and stress test were unremarkable in 2022.\par \par She will continue Eliquis 5 mg po bid and will monitor for bleeding.She will remain on Toprol Xl 12.5\par Her most recent LDL was 116, which is near goal. She is considering the idea of an ILR, if it means we can get her off anticoagulation safely. We will start with a 2 week event monitor.\par  \par She will continue to eat right and exercise. She knows to call with any issues or concerns. We will speak after the above tests and arrange follow up.\par  [EKG obtained to assist in diagnosis and management of assessed problem(s)] : EKG obtained to assist in diagnosis and management of assessed problem(s)

## 2022-07-20 ENCOUNTER — OUTPATIENT (OUTPATIENT)
Dept: OUTPATIENT SERVICES | Facility: HOSPITAL | Age: 72
LOS: 1 days | End: 2022-07-20
Payer: MEDICARE

## 2022-07-20 ENCOUNTER — APPOINTMENT (OUTPATIENT)
Dept: MRI IMAGING | Facility: CLINIC | Age: 72
End: 2022-07-20

## 2022-07-20 DIAGNOSIS — Z90.721 ACQUIRED ABSENCE OF OVARIES, UNILATERAL: Chronic | ICD-10-CM

## 2022-07-20 DIAGNOSIS — M54.12 RADICULOPATHY, CERVICAL REGION: ICD-10-CM

## 2022-07-20 DIAGNOSIS — Z98.890 OTHER SPECIFIED POSTPROCEDURAL STATES: Chronic | ICD-10-CM

## 2022-07-20 DIAGNOSIS — Z00.8 ENCOUNTER FOR OTHER GENERAL EXAMINATION: ICD-10-CM

## 2022-07-20 PROCEDURE — 72141 MRI NECK SPINE W/O DYE: CPT | Mod: 26,MH

## 2022-07-20 PROCEDURE — 72141 MRI NECK SPINE W/O DYE: CPT | Mod: MH

## 2022-09-14 NOTE — H&P PST ADULT - RS GEN PE MLT RESP DETAILS PC
oral breath sounds equal/respirations non-labored/airway patent/good air movement/clear to auscultation bilaterally

## 2022-11-10 ENCOUNTER — APPOINTMENT (OUTPATIENT)
Dept: OBGYN | Facility: CLINIC | Age: 72
End: 2022-11-10

## 2022-11-10 VITALS
WEIGHT: 149 LBS | SYSTOLIC BLOOD PRESSURE: 126 MMHG | DIASTOLIC BLOOD PRESSURE: 80 MMHG | BODY MASS INDEX: 22.58 KG/M2 | HEIGHT: 68 IN

## 2022-11-10 DIAGNOSIS — N81.10 CYSTOCELE, UNSPECIFIED: ICD-10-CM

## 2022-11-10 DIAGNOSIS — Z90.722 ACQUIRED ABSENCE OF OVARIES, BILATERAL: ICD-10-CM

## 2022-11-10 DIAGNOSIS — A60.00 HERPESVIRAL INFECTION OF UROGENITAL SYSTEM, UNSPECIFIED: ICD-10-CM

## 2022-11-10 DIAGNOSIS — N90.5 ATROPHY OF VULVA: ICD-10-CM

## 2022-11-10 DIAGNOSIS — M85.80 OTHER SPECIFIED DISORDERS OF BONE DENSITY AND STRUCTURE, UNSPECIFIED SITE: ICD-10-CM

## 2022-11-10 DIAGNOSIS — Z01.411 ENCOUNTER FOR GYNECOLOGICAL EXAMINATION (GENERAL) (ROUTINE) WITH ABNORMAL FINDINGS: ICD-10-CM

## 2022-11-10 PROCEDURE — G0328 FECAL BLOOD SCRN IMMUNOASSAY: CPT | Mod: GA,QW

## 2022-11-10 PROCEDURE — G0101: CPT | Mod: GA

## 2022-12-07 ENCOUNTER — APPOINTMENT (OUTPATIENT)
Dept: INTERNAL MEDICINE | Facility: CLINIC | Age: 72
End: 2022-12-07

## 2022-12-07 VITALS
DIASTOLIC BLOOD PRESSURE: 70 MMHG | WEIGHT: 150 LBS | RESPIRATION RATE: 14 BRPM | BODY MASS INDEX: 22.73 KG/M2 | HEART RATE: 71 BPM | HEIGHT: 68 IN | SYSTOLIC BLOOD PRESSURE: 118 MMHG | OXYGEN SATURATION: 98 % | TEMPERATURE: 97.3 F

## 2022-12-07 DIAGNOSIS — E04.2 NONTOXIC MULTINODULAR GOITER: ICD-10-CM

## 2022-12-07 DIAGNOSIS — C50.919 MALIGNANT NEOPLASM OF UNSPECIFIED SITE OF UNSPECIFIED FEMALE BREAST: ICD-10-CM

## 2022-12-07 DIAGNOSIS — Z00.00 ENCOUNTER FOR GENERAL ADULT MEDICAL EXAMINATION W/OUT ABNORMAL FINDINGS: ICD-10-CM

## 2022-12-07 PROCEDURE — G0439: CPT

## 2022-12-07 NOTE — ASSESSMENT
[FreeTextEntry1] : Breast ca- follow up with oncologist.\par Thyroid nodules- follow up with head neck\par Afib- follow up with cards.\par HCM_ UTD

## 2022-12-07 NOTE — HEALTH RISK ASSESSMENT
[Very Good] : ~his/her~  mood as very good [0] : 2) Feeling down, depressed, or hopeless: Not at all (0) [PHQ-2 Negative - No further assessment needed] : PHQ-2 Negative - No further assessment needed [Patient reported mammogram was normal] : Patient reported mammogram was normal [Patient reported bone density results were abnormal] : Patient reported bone density results were abnormal [Patient reported colonoscopy was normal] : Patient reported colonoscopy was normal [MammogramDate] : 12/21 [MammogramComments] : [facility] 12/6/22 [BoneDensityDate] : 06/22 [BoneDensityComments] : osteopenia [ColonoscopyDate] : 09/21

## 2022-12-08 LAB
25(OH)D3 SERPL-MCNC: 39.5 NG/ML
ALBUMIN SERPL ELPH-MCNC: 4.5 G/DL
ALP BLD-CCNC: 97 U/L
ALT SERPL-CCNC: 18 U/L
ANION GAP SERPL CALC-SCNC: 9 MMOL/L
APPEARANCE: ABNORMAL
AST SERPL-CCNC: 25 U/L
BACTERIA: NEGATIVE
BASOPHILS # BLD AUTO: 0.05 K/UL
BASOPHILS NFR BLD AUTO: 0.8 %
BILIRUB SERPL-MCNC: 0.4 MG/DL
BILIRUBIN URINE: NEGATIVE
BLOOD URINE: NEGATIVE
BUN SERPL-MCNC: 14 MG/DL
CALCIUM SERPL-MCNC: 9.8 MG/DL
CHLORIDE SERPL-SCNC: 102 MMOL/L
CHOLEST SERPL-MCNC: 214 MG/DL
CO2 SERPL-SCNC: 29 MMOL/L
COLOR: YELLOW
CREAT SERPL-MCNC: 0.81 MG/DL
EGFR: 78 ML/MIN/1.73M2
EOSINOPHIL # BLD AUTO: 0.08 K/UL
EOSINOPHIL NFR BLD AUTO: 1.3 %
ESTIMATED AVERAGE GLUCOSE: 117 MG/DL
FOLATE SERPL-MCNC: >20 NG/ML
GLUCOSE QUALITATIVE U: NEGATIVE
GLUCOSE SERPL-MCNC: 83 MG/DL
HBA1C MFR BLD HPLC: 5.7 %
HCT VFR BLD CALC: 45.6 %
HDLC SERPL-MCNC: 91 MG/DL
HGB BLD-MCNC: 14.4 G/DL
HYALINE CASTS: 2 /LPF
IMM GRANULOCYTES NFR BLD AUTO: 0.2 %
KETONES URINE: NEGATIVE
LDLC SERPL CALC-MCNC: 100 MG/DL
LEUKOCYTE ESTERASE URINE: NEGATIVE
LYMPHOCYTES # BLD AUTO: 1.77 K/UL
LYMPHOCYTES NFR BLD AUTO: 27.8 %
MAN DIFF?: NORMAL
MCHC RBC-ENTMCNC: 29.4 PG
MCHC RBC-ENTMCNC: 31.6 GM/DL
MCV RBC AUTO: 93.1 FL
MICROSCOPIC-UA: NORMAL
MONOCYTES # BLD AUTO: 0.5 K/UL
MONOCYTES NFR BLD AUTO: 7.8 %
NEUTROPHILS # BLD AUTO: 3.96 K/UL
NEUTROPHILS NFR BLD AUTO: 62.1 %
NITRITE URINE: NEGATIVE
NONHDLC SERPL-MCNC: 123 MG/DL
PH URINE: 6
PLATELET # BLD AUTO: 198 K/UL
POTASSIUM SERPL-SCNC: 4.5 MMOL/L
PROT SERPL-MCNC: 6.8 G/DL
PROTEIN URINE: NORMAL
RBC # BLD: 4.9 M/UL
RBC # FLD: 15.7 %
RED BLOOD CELLS URINE: 4 /HPF
SODIUM SERPL-SCNC: 140 MMOL/L
SPECIFIC GRAVITY URINE: 1.02
SQUAMOUS EPITHELIAL CELLS: 1 /HPF
TRIGL SERPL-MCNC: 114 MG/DL
TSH SERPL-ACNC: 1.18 UIU/ML
UROBILINOGEN URINE: NORMAL
VIT B12 SERPL-MCNC: 855 PG/ML
WBC # FLD AUTO: 6.37 K/UL
WHITE BLOOD CELLS URINE: 1 /HPF

## 2022-12-14 ENCOUNTER — NON-APPOINTMENT (OUTPATIENT)
Age: 72
End: 2022-12-14

## 2022-12-14 ENCOUNTER — APPOINTMENT (OUTPATIENT)
Dept: CARDIOLOGY | Facility: CLINIC | Age: 72
End: 2022-12-14

## 2022-12-14 VITALS
HEART RATE: 63 BPM | DIASTOLIC BLOOD PRESSURE: 74 MMHG | BODY MASS INDEX: 22.73 KG/M2 | SYSTOLIC BLOOD PRESSURE: 117 MMHG | HEIGHT: 68 IN | WEIGHT: 150 LBS | OXYGEN SATURATION: 100 %

## 2022-12-14 PROCEDURE — 93000 ELECTROCARDIOGRAM COMPLETE: CPT

## 2022-12-14 PROCEDURE — 99214 OFFICE O/P EST MOD 30 MIN: CPT

## 2022-12-14 NOTE — DISCUSSION/SUMMARY
[With Me] : with me [___ Month(s)] : in [unfilled] month(s) [FreeTextEntry1] : Mrs. Dalal is a 72 year old female with a history of breast ca s/p surgery and radiation, here for follow up. In 2018, she was found to have atrial fibrillation and is now s/p AF ablation. Afterwards, she presented with a persistent tachycardia, and is now s/p ablation of a high darlyn/SVC AT.\par \par She is feeling well today, and has been active without issue.  Her EKG continues to demonstrates a low atrial rhythm. Her BP is at goal. Echo and stress test were unremarkable in 2022.\par \par She will continue Eliquis 5 mg po bid and will monitor for bleeding. She will remain on Toprol Xl 12.5\par Her most recent LDL was 100, which is near goal. \par  \par She will continue to eat right and exercise. She knows to call with any issues or concerns.  I will see her again in 6 months. [EKG obtained to assist in diagnosis and management of assessed problem(s)] : EKG obtained to assist in diagnosis and management of assessed problem(s)

## 2022-12-14 NOTE — HISTORY OF PRESENT ILLNESS
[FreeTextEntry1] : Mrs. Dalal is a 72 year old female with a history of an unclear idiopathic atrial arrhythmia, breast ca s/p surgery and radiation, here for follow up.\par \par Today, she is here for followup.  I last saw her 7/22.\par \par She had an atrial fibrillation ablation on 11/28/2018. Her pulmonary veins were successfully isolated. 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 Despite flecainide and Toprol XL, she had persistent symptoms and resting tachycardia.\par \par She is now s/p ablation on 3/22/2019 of a high darlyn/SVC AT with sinus node modification. The SVC demonstrated automaticity at a rate faster than the atrium. After the procedure, her hr was in the 80's in an accelerated junctional rhythm. She was a little hypotensive post, and an echocardiogram was unremarkable.\par \par Echo with normal LV function, and no valvular pathology as of 1/2022. She had a stress test as well. She exercised for 3 min, though only 73% of max predicated HR was achieved, without ekg changes. \par \par She feels well today. She has no chest pain, difficulty breathing or palpitations.  She will be going to [place] in January.\par She has been taking Toprol XL 12.5 at night.  Her last LDL, was around 100, nonfasting.\par A 2-week monitor in 7/22 demonstrated 16 episodes of NSVT, with the longest lasting 27 seconds, and likely a PAT.  An ectopic atrial rhythm was noted.  There was no atrial fibrillation.\par

## 2022-12-15 ENCOUNTER — NON-APPOINTMENT (OUTPATIENT)
Age: 72
End: 2022-12-15

## 2023-03-31 ENCOUNTER — RX RENEWAL (OUTPATIENT)
Age: 73
End: 2023-03-31

## 2023-03-31 RX ORDER — METOPROLOL SUCCINATE 25 MG/1
25 TABLET, EXTENDED RELEASE ORAL DAILY
Qty: 90 | Refills: 3 | Status: ACTIVE | COMMUNITY
Start: 2018-07-24 | End: 1900-01-01

## 2023-04-13 ENCOUNTER — NON-APPOINTMENT (OUTPATIENT)
Age: 73
End: 2023-04-13

## 2023-04-24 ENCOUNTER — APPOINTMENT (OUTPATIENT)
Dept: INTERNAL MEDICINE | Facility: CLINIC | Age: 73
End: 2023-04-24
Payer: MEDICARE

## 2023-04-24 VITALS
BODY MASS INDEX: 22.73 KG/M2 | OXYGEN SATURATION: 98 % | HEIGHT: 68 IN | HEART RATE: 74 BPM | DIASTOLIC BLOOD PRESSURE: 80 MMHG | TEMPERATURE: 97.3 F | SYSTOLIC BLOOD PRESSURE: 122 MMHG | RESPIRATION RATE: 15 BRPM | WEIGHT: 150 LBS

## 2023-04-24 DIAGNOSIS — J01.90 ACUTE SINUSITIS, UNSPECIFIED: ICD-10-CM

## 2023-04-24 PROCEDURE — 99213 OFFICE O/P EST LOW 20 MIN: CPT

## 2023-04-24 NOTE — PLAN
[FreeTextEntry1] : Signs and symptoms consistent with acute sinusitis. Start Augmentin 875-125 mg BID for 10 days. Continue nasal spray. Start promethazine DM PRN for cough. Follow up in 1 week if no improvement.

## 2023-04-24 NOTE — HISTORY OF PRESENT ILLNESS
[Cold Symptoms] : cold symptoms [Moderate] : moderate [___ Weeks ago] :  [unfilled] weeks ago [Constant] : constant [Congestion] : congestion [Cough] : cough [OTC Remedies] : OTC remedies [Stable] : stable [Chills] : no chills [Shortness Of Breath] : no shortness of breath [Fatigue] : not fatigue [Fever] : no fever [FreeTextEntry5] : nasal spray  [FreeTextEntry8] : She tried tessalon perles and Flonase and feels it has not helped her symptoms.

## 2023-04-24 NOTE — PHYSICAL EXAM
[Coordination Grossly Intact] : coordination grossly intact [No Focal Deficits] : no focal deficits [Normal Gait] : normal gait [Normal] : affect was normal and insight and judgment were intact [de-identified] : +sinus pressure at maxillary sinuses

## 2023-05-01 ENCOUNTER — APPOINTMENT (OUTPATIENT)
Dept: INTERNAL MEDICINE | Facility: CLINIC | Age: 73
End: 2023-05-01
Payer: MEDICARE

## 2023-05-01 ENCOUNTER — NON-APPOINTMENT (OUTPATIENT)
Age: 73
End: 2023-05-01

## 2023-05-01 VITALS
BODY MASS INDEX: 22.73 KG/M2 | HEIGHT: 68 IN | DIASTOLIC BLOOD PRESSURE: 70 MMHG | HEART RATE: 72 BPM | SYSTOLIC BLOOD PRESSURE: 112 MMHG | TEMPERATURE: 98.2 F | RESPIRATION RATE: 15 BRPM | OXYGEN SATURATION: 98 % | WEIGHT: 150 LBS

## 2023-05-01 PROCEDURE — 99213 OFFICE O/P EST LOW 20 MIN: CPT

## 2023-05-01 RX ORDER — AMOXICILLIN AND CLAVULANATE POTASSIUM 875; 125 MG/1; MG/1
875-125 TABLET, COATED ORAL
Qty: 20 | Refills: 0 | Status: DISCONTINUED | COMMUNITY
Start: 2023-04-24 | End: 2023-05-01

## 2023-05-01 NOTE — HISTORY OF PRESENT ILLNESS
[FreeTextEntry8] : 72 year old female who presents today with complaint of diarrhea. She was started on Augmentin last week for sinusitis. Sinus symptoms improved however, she states that she started having diarrhea. She took the antibiotic with food and took a probiotic. However, she states that it continued. She would have several bowel movements a day, and recently she has noticed some blood when she wipes intermittently (which she states has occurred in the past as she has hemorrhoids). She states this morning it has improved and she has not had any other bowel movements other than this morning.

## 2023-05-01 NOTE — PHYSICAL EXAM
[Soft] : abdomen soft [Non Tender] : non-tender [Non-distended] : non-distended [Normal Bowel Sounds] : normal bowel sounds [Coordination Grossly Intact] : coordination grossly intact [No Focal Deficits] : no focal deficits [Normal Gait] : normal gait [Normal] : affect was normal and insight and judgment were intact [de-identified] : moist mucus membranes

## 2023-05-01 NOTE — PLAN
[FreeTextEntry1] : Given recent antibiotic use, will check C diff PCR and stool culture. \par BRAT diet recommended, recommend increased hydration \par Continue probiotic. \par Follow up in 1 week if no improvement.

## 2023-05-02 DIAGNOSIS — R19.7 DIARRHEA, UNSPECIFIED: ICD-10-CM

## 2023-05-02 LAB — CDIFF BY PCR: NOT DETECTED

## 2023-05-17 LAB — BACTERIA STL CULT: ABNORMAL

## 2023-05-22 ENCOUNTER — RX RENEWAL (OUTPATIENT)
Age: 73
End: 2023-05-22

## 2023-06-09 RX ORDER — VALACYCLOVIR 1 G/1
1 TABLET, FILM COATED ORAL
Qty: 90 | Refills: 0 | Status: ACTIVE | COMMUNITY
Start: 2022-08-08 | End: 1900-01-01

## 2023-06-09 RX ORDER — ACYCLOVIR 50 MG/G
5 OINTMENT TOPICAL 3 TIMES DAILY
Qty: 1 | Refills: 0 | Status: ACTIVE | COMMUNITY
Start: 2022-08-08 | End: 1900-01-01

## 2023-06-15 ENCOUNTER — NON-APPOINTMENT (OUTPATIENT)
Age: 73
End: 2023-06-15

## 2023-06-15 ENCOUNTER — APPOINTMENT (OUTPATIENT)
Dept: CARDIOLOGY | Facility: CLINIC | Age: 73
End: 2023-06-15
Payer: MEDICARE

## 2023-06-15 VITALS
HEART RATE: 60 BPM | SYSTOLIC BLOOD PRESSURE: 121 MMHG | BODY MASS INDEX: 22.73 KG/M2 | HEIGHT: 68 IN | OXYGEN SATURATION: 97 % | WEIGHT: 150 LBS | DIASTOLIC BLOOD PRESSURE: 76 MMHG

## 2023-06-15 DIAGNOSIS — R94.31 ABNORMAL ELECTROCARDIOGRAM [ECG] [EKG]: ICD-10-CM

## 2023-06-15 PROCEDURE — 93000 ELECTROCARDIOGRAM COMPLETE: CPT

## 2023-06-15 PROCEDURE — 99214 OFFICE O/P EST MOD 30 MIN: CPT

## 2023-06-15 NOTE — HISTORY OF PRESENT ILLNESS
[FreeTextEntry1] : Mrs. Dalal is a 72 year old female with a history of an unclear idiopathic atrial arrhythmia, breast ca s/p surgery and radiation, here for follow up.\par \par Today, she is here for followup.  I last saw her 12/22.\par \par She had an atrial fibrillation ablation on 11/28/2018. Her pulmonary veins were successfully isolated. 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 Despite flecainide and Toprol XL, she had persistent symptoms and resting tachycardia.\par \par She is now s/p ablation on 3/22/2019 of a high darlyn/SVC AT with sinus node modification. The SVC demonstrated automaticity at a rate faster than the atrium. After the procedure, her hr was in the 80's in an accelerated junctional rhythm. She was a little hypotensive post, and an echocardiogram was unremarkable.\par \par Echo with normal LV function, and no valvular pathology as of 1/2022. She had a stress test as well. She exercised for 3 min, though only 73% of max predicated HR was achieved, without ekg changes. \par \par She feels well today. She has no chest pain, difficulty breathing or palpitations. \par Since last visit, she had a terrible GI reaction to Augmentin. She saw her GI today and is having a CT of her abdomen.\par She has been taking Toprol XL 12.5 at night.  Her last LDL, was around 100, nonfasting.\par A 2-week monitor in 7/22 demonstrated 16 episodes of NSVT, with the longest lasting 27 seconds, and likely a PAT.  An ectopic atrial rhythm was noted.  There was no atrial fibrillation.\par

## 2023-06-19 ENCOUNTER — APPOINTMENT (OUTPATIENT)
Dept: INTERNAL MEDICINE | Facility: CLINIC | Age: 73
End: 2023-06-19
Payer: MEDICARE

## 2023-06-19 VITALS
BODY MASS INDEX: 22.58 KG/M2 | HEIGHT: 68 IN | RESPIRATION RATE: 16 BRPM | HEART RATE: 72 BPM | TEMPERATURE: 98.2 F | OXYGEN SATURATION: 98 % | WEIGHT: 149 LBS | DIASTOLIC BLOOD PRESSURE: 78 MMHG | SYSTOLIC BLOOD PRESSURE: 118 MMHG

## 2023-06-19 DIAGNOSIS — R93.41 ABNORMAL RADIOLOGIC FINDINGS ON DIAGNOSTIC IMAGING OF OF RENAL PELVIS, URETER, OR BLADDER: ICD-10-CM

## 2023-06-19 DIAGNOSIS — R93.5 ABNORMAL FINDINGS ON DIAGNOSTIC IMAGING OF OTHER ABDOMINAL REGIONS, INCLUDING RETROPERITONEUM: ICD-10-CM

## 2023-06-19 DIAGNOSIS — N39.0 URINARY TRACT INFECTION, SITE NOT SPECIFIED: ICD-10-CM

## 2023-06-19 LAB
APPEARANCE: CLEAR
BACTERIA: NEGATIVE /HPF
BILIRUB UR QL STRIP: NORMAL
BILIRUBIN URINE: NEGATIVE
BLOOD URINE: ABNORMAL
CAST: 0 /LPF
CLARITY UR: NORMAL
COLLECTION METHOD: NORMAL
COLOR: ABNORMAL
EPITHELIAL CELLS: 1 /HPF
GLUCOSE QUALITATIVE U: NEGATIVE MG/DL
GLUCOSE UR-MCNC: NORMAL
HCG UR QL: 0.2 EU/DL
HGB UR QL STRIP.AUTO: NORMAL
KETONES UR-MCNC: NORMAL
KETONES URINE: NEGATIVE MG/DL
LEUKOCYTE ESTERASE UR QL STRIP: NORMAL
LEUKOCYTE ESTERASE URINE: ABNORMAL
MICROSCOPIC-UA: NORMAL
NITRITE UR QL STRIP: NORMAL
NITRITE URINE: NEGATIVE
PH UR STRIP: 6.5
PH URINE: 6
PROT UR STRIP-MCNC: 100
PROTEIN URINE: 100 MG/DL
RED BLOOD CELLS URINE: 78 /HPF
SP GR UR STRIP: 1.01
SPECIFIC GRAVITY URINE: 1.01
UROBILINOGEN URINE: 0.2 MG/DL
WHITE BLOOD CELLS URINE: 212 /HPF

## 2023-06-19 PROCEDURE — 99213 OFFICE O/P EST LOW 20 MIN: CPT | Mod: 25

## 2023-06-19 PROCEDURE — 81003 URINALYSIS AUTO W/O SCOPE: CPT | Mod: QW

## 2023-06-19 RX ORDER — NITROFURANTOIN (MONOHYDRATE/MACROCRYSTALS) 25; 75 MG/1; MG/1
100 CAPSULE ORAL
Qty: 10 | Refills: 0 | Status: ACTIVE | COMMUNITY
Start: 2023-06-19 | End: 1900-01-01

## 2023-06-19 RX ORDER — PROMETHAZINE HYDROCHLORIDE AND DEXTROMETHORPHAN HYDROBROMIDE ORAL SOLUTION 15; 6.25 MG/5ML; MG/5ML
6.25-15 SOLUTION ORAL
Qty: 420 | Refills: 0 | Status: DISCONTINUED | COMMUNITY
Start: 2023-04-24 | End: 2023-06-19

## 2023-06-19 NOTE — HISTORY OF PRESENT ILLNESS
[FreeTextEntry8] : 72 year old female who presents today with complaint of burning with urination and blood in her urine this morning. She states that last Friday she had gotten a CT of her abdomen/pelvis done with oral and IV contrast as ordered by her GI. She states that the next day she started having burning with urination that has persisted, suprapubic pressure, and lower back pain. This morning she noticed some blood in her urine, no gross clots. She denies any fevers, chills, rigors, N/V, dizziness, lightheadedness. 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

## 2023-06-19 NOTE — PHYSICAL EXAM
[Normal Oropharynx] : the oropharynx was normal [Soft] : abdomen soft [Non Tender] : non-tender [Non-distended] : non-distended [Normal Bowel Sounds] : normal bowel sounds [No CVA Tenderness] : no CVA  tenderness [Coordination Grossly Intact] : coordination grossly intact [No Focal Deficits] : no focal deficits [Normal Gait] : normal gait [Normal] : affect was normal and insight and judgment were intact

## 2023-06-19 NOTE — PLAN
[FreeTextEntry1] : Patient presenting today with a few days of burning and suprapubic pressure, now with some blood in her urine, no clots. Urine dip shows large blood and moderate leukocytes. Signs and symptoms consistent with UTI. Will start Macrobid pending culture and sensitivity data. No signs or kidney stone on imaging done a few days prior, she has no flank pain. ER precautions have been provided. She has been advised to contact cardio in regards to holding Eliquis for a few days while she has blood in her urine. Will repeat urine studies in 2-3 weeks to check for resolution. \par \par Abnormal findings on imaging: will check US kidney/bladder for renal lesion seen on right upper pole of kidney. Will also check MRI pelvis given lesion seen in sacral/pelvic region.

## 2023-06-20 DIAGNOSIS — R31.9 HEMATURIA, UNSPECIFIED: ICD-10-CM

## 2023-06-20 LAB — BACTERIA UR CULT: NORMAL

## 2023-06-21 ENCOUNTER — NON-APPOINTMENT (OUTPATIENT)
Age: 73
End: 2023-06-21

## 2023-06-21 PROBLEM — R93.5 ABNORMAL CT OF THE ABDOMEN: Status: ACTIVE | Noted: 2023-06-19

## 2023-06-26 ENCOUNTER — APPOINTMENT (OUTPATIENT)
Dept: INTERNAL MEDICINE | Facility: CLINIC | Age: 73
End: 2023-06-26
Payer: COMMERCIAL

## 2023-06-26 VITALS
SYSTOLIC BLOOD PRESSURE: 124 MMHG | HEIGHT: 68 IN | DIASTOLIC BLOOD PRESSURE: 74 MMHG | TEMPERATURE: 98.4 F | OXYGEN SATURATION: 97 % | BODY MASS INDEX: 22.73 KG/M2 | WEIGHT: 150 LBS | HEART RATE: 86 BPM | RESPIRATION RATE: 14 BRPM

## 2023-06-26 DIAGNOSIS — Z87.448 PERSONAL HISTORY OF OTHER DISEASES OF URINARY SYSTEM: ICD-10-CM

## 2023-06-26 DIAGNOSIS — R58 HEMORRHAGE, NOT ELSEWHERE CLASSIFIED: ICD-10-CM

## 2023-06-26 PROCEDURE — 99214 OFFICE O/P EST MOD 30 MIN: CPT

## 2023-06-26 RX ORDER — CYCLOBENZAPRINE HYDROCHLORIDE 5 MG/1
5 TABLET, FILM COATED ORAL
Qty: 30 | Refills: 2 | Status: DISCONTINUED | COMMUNITY
Start: 2023-05-22 | End: 2023-06-26
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
## 2023-06-26 NOTE — PHYSICAL EXAM
[Pedal Pulses Present] : the pedal pulses are present [No Edema] : there was no peripheral edema [Normal] : affect was normal and insight and judgment were intact [de-identified] : bruising with superifial swelling noted in left hip, ecchymosis with healing stages noted on left hip, lower abdomen, and right upper arm.

## 2023-06-27 LAB
APPEARANCE: CLEAR
BACTERIA UR CULT: NORMAL
BACTERIA: NEGATIVE /HPF
BILIRUBIN URINE: NEGATIVE
BLOOD URINE: NEGATIVE
CAST: 0 /LPF
COLOR: YELLOW
EPITHELIAL CELLS: 0 /HPF
GLUCOSE QUALITATIVE U: NEGATIVE MG/DL
KETONES URINE: NEGATIVE MG/DL
LEUKOCYTE ESTERASE URINE: ABNORMAL
MICROSCOPIC-UA: NORMAL
NITRITE URINE: NEGATIVE
PH URINE: 7
PROTEIN URINE: NEGATIVE MG/DL
RED BLOOD CELLS URINE: 0 /HPF
SPECIFIC GRAVITY URINE: 1.01
UROBILINOGEN URINE: 0.2 MG/DL
WHITE BLOOD CELLS URINE: 0 /HPF

## 2023-06-29 ENCOUNTER — APPOINTMENT (OUTPATIENT)
Dept: UROLOGY | Facility: CLINIC | Age: 73
End: 2023-06-29
Payer: MEDICARE

## 2023-06-29 VITALS
OXYGEN SATURATION: 98 % | HEIGHT: 67.5 IN | WEIGHT: 150 LBS | DIASTOLIC BLOOD PRESSURE: 86 MMHG | HEART RATE: 80 BPM | SYSTOLIC BLOOD PRESSURE: 118 MMHG | BODY MASS INDEX: 23.27 KG/M2 | RESPIRATION RATE: 16 BRPM

## 2023-06-29 DIAGNOSIS — N28.1 CYST OF KIDNEY, ACQUIRED: ICD-10-CM

## 2023-06-29 PROCEDURE — 99204 OFFICE O/P NEW MOD 45 MIN: CPT

## 2023-06-29 RX ORDER — CYCLOBENZAPRINE HYDROCHLORIDE 10 MG/1
10 TABLET, FILM COATED ORAL 3 TIMES DAILY
Qty: 30 | Refills: 0 | Status: ACTIVE | COMMUNITY
Start: 2023-06-26

## 2023-06-29 NOTE — ASSESSMENT
[FreeTextEntry1] : Reviewed records.\par Discussed labs. \par \par Renal cyst:\par Discussed that Renal cysts are a common finding on routine radiological studies. Autopsy studies in patients over the age of 50 reveal greater than a 50% chance of having at least one simple renal cyst.\par And that renal cysts may be classified as simple or complex depending how they look on imaging. Discussed complexity of renal cysts and its implications as regards malignancy. \par  \par Gross hematuria:\par Discussed the differential diagnosis of hematuria including benign and malignant pathology- including but not limited to nephrolithiasis, bladder stone, urinary tract infection, glomerular disease, renal cancer, bladder cancer. \par Also discussed the chance that workup will not reveal a source for the bleeding. The patient understands that the hematuria could be from an upper tract (kidney or ureter) or lower tract (bladder, urethra) and that workup includes imaging and direct visualization of all of these.\par \par Patient agreeable to proceed with work up with Urinalysis with microscopy, Urine culture, Urine cytology and Cystoscopy. \par \par Return to clinic for next available Cystoscopy.

## 2023-06-29 NOTE — REVIEW OF SYSTEMS
[Dry Eyes] : dryness of the eyes [Vomiting] : vomiting [Heartburn] : heartburn [see HPI] : see HPI [Joint Pain] : joint pain [Negative] : Heme/Lymph

## 2023-06-30 NOTE — H&P ADULT - ASSESSMENT
failure to thrive, dysphagia, PEG eval
68 f s/p ablation for AF now a/w persistent palpitations and sinus tach

## 2023-07-01 ENCOUNTER — APPOINTMENT (OUTPATIENT)
Dept: ULTRASOUND IMAGING | Facility: CLINIC | Age: 73
End: 2023-07-01
Payer: MEDICARE

## 2023-07-01 ENCOUNTER — OUTPATIENT (OUTPATIENT)
Dept: OUTPATIENT SERVICES | Facility: HOSPITAL | Age: 73
LOS: 1 days | End: 2023-07-01
Payer: MEDICARE

## 2023-07-01 DIAGNOSIS — Z90.721 ACQUIRED ABSENCE OF OVARIES, UNILATERAL: Chronic | ICD-10-CM

## 2023-07-01 DIAGNOSIS — Z98.890 OTHER SPECIFIED POSTPROCEDURAL STATES: Chronic | ICD-10-CM

## 2023-07-01 DIAGNOSIS — R93.41 ABNORMAL RADIOLOGIC FINDINGS ON DIAGNOSTIC IMAGING OF RENAL PELVIS, URETER, OR BLADDER: ICD-10-CM

## 2023-07-01 PROCEDURE — 76770 US EXAM ABDO BACK WALL COMP: CPT

## 2023-07-01 PROCEDURE — 76770 US EXAM ABDO BACK WALL COMP: CPT | Mod: 26

## 2023-07-02 ENCOUNTER — RX RENEWAL (OUTPATIENT)
Age: 73
End: 2023-07-02

## 2023-07-03 LAB
APPEARANCE: CLEAR
BACTERIA UR CULT: NORMAL
BACTERIA: NEGATIVE /HPF
BILIRUBIN URINE: NEGATIVE
BLOOD URINE: NEGATIVE
CAST: 0 /LPF
COLOR: YELLOW
EPITHELIAL CELLS: 1 /HPF
GLUCOSE QUALITATIVE U: NEGATIVE MG/DL
KETONES URINE: NEGATIVE MG/DL
LEUKOCYTE ESTERASE URINE: NEGATIVE
MICROSCOPIC-UA: NORMAL
NITRITE URINE: NEGATIVE
PH URINE: 7
PROTEIN URINE: NEGATIVE MG/DL
RED BLOOD CELLS URINE: 1 /HPF
SPECIFIC GRAVITY URINE: 1.01
URINE CYTOLOGY: NORMAL
UROBILINOGEN URINE: 0.2 MG/DL
WHITE BLOOD CELLS URINE: 0 /HPF

## 2023-07-05 ENCOUNTER — APPOINTMENT (OUTPATIENT)
Dept: ULTRASOUND IMAGING | Facility: CLINIC | Age: 73
End: 2023-07-05
Payer: COMMERCIAL

## 2023-07-05 ENCOUNTER — OUTPATIENT (OUTPATIENT)
Dept: OUTPATIENT SERVICES | Facility: HOSPITAL | Age: 73
LOS: 1 days | End: 2023-07-05
Payer: COMMERCIAL

## 2023-07-05 DIAGNOSIS — R58 HEMORRHAGE, NOT ELSEWHERE CLASSIFIED: ICD-10-CM

## 2023-07-05 DIAGNOSIS — Z90.721 ACQUIRED ABSENCE OF OVARIES, UNILATERAL: Chronic | ICD-10-CM

## 2023-07-05 DIAGNOSIS — Z98.890 OTHER SPECIFIED POSTPROCEDURAL STATES: Chronic | ICD-10-CM

## 2023-07-05 PROCEDURE — 76882 US LMTD JT/FCL EVL NVASC XTR: CPT | Mod: 26,LT

## 2023-07-05 PROCEDURE — 76882 US LMTD JT/FCL EVL NVASC XTR: CPT

## 2023-07-10 LAB
ANION GAP SERPL CALC-SCNC: 16 MMOL/L
BUN SERPL-MCNC: 11 MG/DL
CALCIUM SERPL-MCNC: 9.7 MG/DL
CHLORIDE SERPL-SCNC: 103 MMOL/L
CO2 SERPL-SCNC: 24 MMOL/L
CREAT SERPL-MCNC: 0.75 MG/DL
EGFR: 86 ML/MIN/1.73M2
GLUCOSE SERPL-MCNC: 119 MG/DL
POTASSIUM SERPL-SCNC: 4.4 MMOL/L
SODIUM SERPL-SCNC: 143 MMOL/L

## 2023-07-24 ENCOUNTER — RESULT REVIEW (OUTPATIENT)
Age: 73
End: 2023-07-24

## 2023-07-24 ENCOUNTER — OUTPATIENT (OUTPATIENT)
Dept: OUTPATIENT SERVICES | Facility: HOSPITAL | Age: 73
LOS: 1 days | End: 2023-07-24
Payer: MEDICARE

## 2023-07-24 ENCOUNTER — APPOINTMENT (OUTPATIENT)
Dept: MRI IMAGING | Facility: CLINIC | Age: 73
End: 2023-07-24
Payer: MEDICARE

## 2023-07-24 DIAGNOSIS — Z90.721 ACQUIRED ABSENCE OF OVARIES, UNILATERAL: Chronic | ICD-10-CM

## 2023-07-24 DIAGNOSIS — Z98.890 OTHER SPECIFIED POSTPROCEDURAL STATES: Chronic | ICD-10-CM

## 2023-07-24 DIAGNOSIS — R93.41 ABNORMAL RADIOLOGIC FINDINGS ON DIAGNOSTIC IMAGING OF RENAL PELVIS, URETER, OR BLADDER: ICD-10-CM

## 2023-07-24 PROCEDURE — A9585: CPT

## 2023-07-24 PROCEDURE — 72197 MRI PELVIS W/O & W/DYE: CPT | Mod: 26,MH

## 2023-07-24 PROCEDURE — 72197 MRI PELVIS W/O & W/DYE: CPT

## 2023-07-27 ENCOUNTER — APPOINTMENT (OUTPATIENT)
Dept: UROLOGY | Facility: CLINIC | Age: 73
End: 2023-07-27
Payer: MEDICARE

## 2023-07-27 VITALS
SYSTOLIC BLOOD PRESSURE: 123 MMHG | HEART RATE: 82 BPM | OXYGEN SATURATION: 98 % | BODY MASS INDEX: 23.27 KG/M2 | WEIGHT: 150 LBS | HEIGHT: 67.5 IN | DIASTOLIC BLOOD PRESSURE: 80 MMHG

## 2023-07-27 DIAGNOSIS — R31.0 GROSS HEMATURIA: ICD-10-CM

## 2023-07-27 PROCEDURE — 52000 CYSTOURETHROSCOPY: CPT

## 2023-07-28 PROBLEM — R31.0 GROSS HEMATURIA: Status: ACTIVE | Noted: 2023-06-29

## 2023-08-11 ENCOUNTER — NON-APPOINTMENT (OUTPATIENT)
Age: 73
End: 2023-08-11

## 2023-08-11 ENCOUNTER — APPOINTMENT (OUTPATIENT)
Dept: ORTHOPEDIC SURGERY | Facility: CLINIC | Age: 73
End: 2023-08-11
Payer: COMMERCIAL

## 2023-08-11 VITALS — BODY MASS INDEX: 23.27 KG/M2 | WEIGHT: 150 LBS | HEIGHT: 67.5 IN
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
## 2023-08-11 PROCEDURE — 99204 OFFICE O/P NEW MOD 45 MIN: CPT

## 2023-08-11 PROCEDURE — 73610 X-RAY EXAM OF ANKLE: CPT | Mod: LT

## 2023-08-11 NOTE — DISCUSSION/SUMMARY
[de-identified] : Today I had a lengthy discussion with the patient regarding their left ankle pain. I have addressed all the patient's concerns surrounding the pathology of their condition. X-ray films obtained today were reviewed in great detail with the patient today. At this time I would like to obtain advanced imaging of the patient's left ankle. An MRI was ordered so I can find out more about the etiology of the patient's condition. The patient should follow up with the office after obtaining the MRI. In the interim, I recommend that the patient utilize Voltaren gel topically. If the Voltaren gel could not be obtained, Icy Hot, Biofreeze, or Bengay can be utilized instead. I recommended that the patient utilize an ASO brace. The patient was fitted for the ASO brace in the office today.  The patient understood and verbally agreed to the treatment plan. All of their questions were answered and they were satisfied with the visit. The patient should call the office if they have any questions or experience worsening symptoms.

## 2023-08-11 NOTE — PHYSICAL EXAM
[de-identified] : Left Ankle Exam  General: Alert and oriented x3.  In no acute distress.  Pleasant in nature with a normal affect.  No apparent respiratory distress.  Erythema, Warmth, Rubor: Negative Swelling: Negative  ROM: 1. Dorsiflexion: 10 degrees 2. Plantarflexion: 40 degrees 3. Subtalar: 10 degrees 4. Inversion: 30 degrees 5. Eversion: 30 degrees  Tenderness to Palpation: + anterolateral joint tenderness 1. Lateral Malleolus: Negative 2. Medial Malleolus: Negative 3. Proximal Fibular Pain: Negative 4. Heel Pain: Negative 5. Tibiotalar Joint: Negative  Tendon Pain: 1. Peroneals: Negative 2. Posterior Tibialis: Negative 3. Achilles: Negative 4. Anterior Tibialis: Negative  Ligament Pain: 1. ATFL/CFL/PTFL: Negative 2. Deltoid Ligaments: Negative  Stability:  1. Anterior Drawer: Negative 2. Posterior Drawer: Negative  Strength: 5/5 TA/GS/EHL  Pulses: 2+ DP/PT Pulses  Neuro: Intact motor and sensory  Additional Test: 1. Guallpa's Test: Negative 2. Syndesmosis Squeeze Test: Negative 3. Plantar Fascia: Negative 4. Calcaneal Squeeze: Negative [de-identified] : 3V of the left ankle were ordered obtained and reviewed by me today, 08/11/2023 , revealed: No acute bone abnormalities
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
## 2023-08-11 NOTE — ADDENDUM
[FreeTextEntry1] : I, JERILYN HOLLIS, acted solely as a scribe for Dr. Wilian Barry on this date 08/11/2023  .   All medical record entries made by the Scribe were at my, Dr. Wilian Barry, direction and personally dictated by me on 08/11/2023 . I have reviewed the chart and agree that the record accurately reflects my personal performance of the history, physical exam, assessment and plan. I have also personally directed, reviewed, and agreed with the chart.

## 2023-08-18 DIAGNOSIS — R93.5 ABNORMAL FINDINGS ON DIAGNOSTIC IMAGING OF OTHER ABDOMINAL REGIONS, INCLUDING RETROPERITONEUM: ICD-10-CM

## 2023-08-25 ENCOUNTER — APPOINTMENT (OUTPATIENT)
Dept: ORTHOPEDIC SURGERY | Facility: CLINIC | Age: 73
End: 2023-08-25
Payer: COMMERCIAL

## 2023-08-25 PROCEDURE — 99214 OFFICE O/P EST MOD 30 MIN: CPT

## 2023-08-25 NOTE — ADDENDUM
[FreeTextEntry1] : I, JERILYN HOLLIS, acted solely as a scribe for Dr. Wilian Barry on this date 08/25/2023  .   All medical record entries made by the Scribe were at my, Dr. Wilian Barry, direction and personally dictated by me on 08/25/2023 . I have reviewed the chart and agree that the record accurately reflects my personal performance of the history, physical exam, assessment and plan. I have also personally directed, reviewed, and agreed with the chart.
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
## 2023-08-25 NOTE — DISCUSSION/SUMMARY
[de-identified] : Today I had a lengthy discussion with the patient regarding their left ankle pain. I have addressed all the patient's concerns surrounding the pathology of their condition. MRI results of the left ankle were interpreted by me and reviewed in great detail with the patient today in office. At this time, the patient will move forward with conservative treatment. I recommend the patient undergo a course of physical therapy for the LLE 2-3 times a week for a total of 6-8 weeks. A prescription was given for the physical therapy today.    The patient understood and verbally agreed to the treatment plan. All of their questions were answered and they were satisfied with the visit. The patient should call the office if they have any questions or experience worsening symptoms.

## 2023-08-25 NOTE — PHYSICAL EXAM
[de-identified] : Left Ankle Exam  General: Alert and oriented x3.  In no acute distress.  Pleasant in nature with a normal affect.  No apparent respiratory distress.  Erythema, Warmth, Rubor: Negative Swelling: Negative  ROM: 1. Dorsiflexion: 10 degrees 2. Plantarflexion: 40 degrees 3. Subtalar: 10 degrees 4. Inversion: 30 degrees 5. Eversion: 30 degrees  Tenderness to Palpation: + anterolateral joint tenderness 1. Lateral Malleolus: Negative 2. Medial Malleolus: Negative 3. Proximal Fibular Pain: Negative 4. Heel Pain: Negative 5. Tibiotalar Joint: Negative  Tendon Pain: 1. Peroneals: Negative 2. Posterior Tibialis: Negative 3. Achilles: Negative 4. Anterior Tibialis: Negative  Ligament Pain: 1. ATFL/CFL/PTFL: Negative 2. Deltoid Ligaments: Negative  Stability:  1. Anterior Drawer: Negative 2. Posterior Drawer: Negative  Strength: 5/5 TA/GS/EHL  Pulses: 2+ DP/PT Pulses  Neuro: Intact motor and sensory  Additional Test: 1. Guallpa's Test: Negative 2. Syndesmosis Squeeze Test: Negative 3. Plantar Fascia: Negative 4. Calcaneal Squeeze: Negative [de-identified] : MRI reviewed and interpreted by me. Franki in the chart. Ankle sprain.

## 2023-08-30 ENCOUNTER — APPOINTMENT (OUTPATIENT)
Dept: OBGYN | Facility: CLINIC | Age: 73
End: 2023-08-30
Payer: MEDICARE

## 2023-08-30 VITALS — DIASTOLIC BLOOD PRESSURE: 80 MMHG | SYSTOLIC BLOOD PRESSURE: 123 MMHG

## 2023-08-30 DIAGNOSIS — R39.9 UNSPECIFIED SYMPTOMS AND SIGNS INVOLVING THE GENITOURINARY SYSTEM: ICD-10-CM

## 2023-08-30 PROCEDURE — 99213 OFFICE O/P EST LOW 20 MIN: CPT

## 2023-08-30 PROCEDURE — 81002 URINALYSIS NONAUTO W/O SCOPE: CPT

## 2023-08-30 RX ORDER — NITROFURANTOIN (MONOHYDRATE/MACROCRYSTALS) 25; 75 MG/1; MG/1
100 CAPSULE ORAL TWICE DAILY
Qty: 10 | Refills: 0 | Status: ACTIVE | COMMUNITY
Start: 2023-08-30 | End: 1900-01-01

## 2023-09-01 LAB
APPEARANCE: CLEAR
BILIRUBIN URINE: NEGATIVE
BLOOD URINE: NEGATIVE
COLOR: YELLOW
GLUCOSE QUALITATIVE U: NEGATIVE MG/DL
KETONES URINE: ABNORMAL MG/DL
LEUKOCYTE ESTERASE URINE: NEGATIVE
NITRITE URINE: NEGATIVE
PH URINE: 6.5
PROTEIN URINE: NORMAL MG/DL
SPECIFIC GRAVITY URINE: 1.02
UROBILINOGEN URINE: 0.2 MG/DL

## 2023-09-01 NOTE — HISTORY OF PRESENT ILLNESS
[FreeTextEntry1] : EULALIO RASHID 72 year old female presents with UTI sxs.  Pt c/o burning upon urination and constant pelvic pressure that began about a week ago. Denies urinary frequency or urgency. No vaginal bleeding or vaginitis sxs. No abdominal or pelvic pain. Normal BMs.   She saw Uro in 06/23 and they recommended a cystoscopy. Known h/o renal cysts and hematuria.  PMH breast ca in 2016 s/p RT and lumpectomy, osteopenia, 2nd degree cystocele, atrophy, HSV, BRCA negative, IBS, arrhythmia, reflux SHx BSO, RT and lumpectomy for breast ca, ovarian cystectomy, cardiac ablation x 2 FHx Denies FHx of breast, ovarian, uterine or colon cancer. All Sulfa, Tetracycline, Cipro, Valium MEDS Eliquis, Metoprolol, Acyclovir

## 2023-09-01 NOTE — PLAN
[FreeTextEntry1] : #UTI sxs -UA/urine cx sent -Rx sent for Macrobid -To schedule non urgent TVUS to r/o GYN etiology  RTO for TVUS or for annual visit or PRN for any GYN complaints

## 2023-09-01 NOTE — SIGNATURES
[TextEntry] : This note was written by Brandon Yanes on 08/30/2023 actively solely LORIE Cosme M.D.  All medical record entries made by the Scribe were at my, LORIE Cosme M.D. direction and personally dictated by me on 08/30/2023. I have personally reviewed the chart and agree that the record reflects my personal performance of the history, physical exam, assessment, and plan.

## 2023-09-01 NOTE — PHYSICAL EXAM
[Chaperone Present] : A chaperone was present in the examining room during all aspects of the physical examination [Vulvar Atrophy] : vulvar atrophy [Labia Majora] : normal [Labia Minora] : normal [Atrophy] : atrophy [Cystocele] : a cystocele [Normal] : normal [Uterine Adnexae] : normal [FreeTextEntry1] : yuliana

## 2023-09-09 ENCOUNTER — NON-APPOINTMENT (OUTPATIENT)
Age: 73
End: 2023-09-09

## 2023-09-21 ENCOUNTER — APPOINTMENT (OUTPATIENT)
Dept: OBGYN | Facility: CLINIC | Age: 73
End: 2023-09-21
Payer: MEDICARE

## 2023-09-21 ENCOUNTER — ASOB RESULT (OUTPATIENT)
Age: 73
End: 2023-09-21

## 2023-09-21 DIAGNOSIS — Z12.39 ENCOUNTER FOR OTHER SCREENING FOR MALIGNANT NEOPLASM OF BREAST: ICD-10-CM

## 2023-09-21 DIAGNOSIS — R92.2 INCONCLUSIVE MAMMOGRAM: ICD-10-CM

## 2023-09-21 PROCEDURE — 76830 TRANSVAGINAL US NON-OB: CPT

## 2023-09-26 ENCOUNTER — RX RENEWAL (OUTPATIENT)
Age: 73
End: 2023-09-26

## 2023-09-28 ENCOUNTER — APPOINTMENT (OUTPATIENT)
Dept: ORTHOPEDIC SURGERY | Facility: CLINIC | Age: 73
End: 2023-09-28

## 2023-09-28 ENCOUNTER — APPOINTMENT (OUTPATIENT)
Dept: ORTHOPEDIC SURGERY | Facility: CLINIC | Age: 73
End: 2023-09-28
Payer: COMMERCIAL

## 2023-09-28 VITALS — BODY MASS INDEX: 22.49 KG/M2 | WEIGHT: 145 LBS | HEIGHT: 67.5 IN

## 2023-09-28 PROCEDURE — 20611 DRAIN/INJ JOINT/BURSA W/US: CPT | Mod: LT

## 2023-09-28 PROCEDURE — 99214 OFFICE O/P EST MOD 30 MIN: CPT | Mod: 25

## 2023-09-28 PROCEDURE — 73030 X-RAY EXAM OF SHOULDER: CPT | Mod: LT

## 2023-10-27 ENCOUNTER — APPOINTMENT (OUTPATIENT)
Dept: ORTHOPEDIC SURGERY | Facility: CLINIC | Age: 73
End: 2023-10-27
Payer: COMMERCIAL

## 2023-10-27 DIAGNOSIS — M25.572 PAIN IN LEFT ANKLE AND JOINTS OF LEFT FOOT: ICD-10-CM
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
## 2023-10-27 PROCEDURE — 99213 OFFICE O/P EST LOW 20 MIN: CPT

## 2023-11-02 ENCOUNTER — OUTPATIENT (OUTPATIENT)
Dept: OUTPATIENT SERVICES | Facility: HOSPITAL | Age: 73
LOS: 1 days | End: 2023-11-02
Payer: MEDICARE

## 2023-11-02 ENCOUNTER — APPOINTMENT (OUTPATIENT)
Dept: MRI IMAGING | Facility: CLINIC | Age: 73
End: 2023-11-02
Payer: MEDICARE

## 2023-11-02 DIAGNOSIS — Z98.890 OTHER SPECIFIED POSTPROCEDURAL STATES: Chronic | ICD-10-CM

## 2023-11-02 DIAGNOSIS — R93.5 ABNORMAL FINDINGS ON DIAGNOSTIC IMAGING OF OTHER ABDOMINAL REGIONS, INCLUDING RETROPERITONEUM: ICD-10-CM

## 2023-11-02 DIAGNOSIS — Z90.721 ACQUIRED ABSENCE OF OVARIES, UNILATERAL: Chronic | ICD-10-CM

## 2023-11-02 PROCEDURE — A9585: CPT

## 2023-11-02 PROCEDURE — 72197 MRI PELVIS W/O & W/DYE: CPT

## 2023-11-02 PROCEDURE — 72197 MRI PELVIS W/O & W/DYE: CPT | Mod: 26,MH

## 2023-11-13 ENCOUNTER — APPOINTMENT (OUTPATIENT)
Dept: ORTHOPEDIC SURGERY | Facility: CLINIC | Age: 73
End: 2023-11-13
Payer: COMMERCIAL

## 2023-11-13 VITALS — WEIGHT: 145 LBS | HEIGHT: 67.5 IN | BODY MASS INDEX: 22.49 KG/M2

## 2023-11-13 DIAGNOSIS — M75.42 IMPINGEMENT SYNDROME OF LEFT SHOULDER: ICD-10-CM

## 2023-11-13 DIAGNOSIS — M75.22 BICIPITAL TENDINITIS, LEFT SHOULDER: ICD-10-CM

## 2023-11-13 DIAGNOSIS — S46.012A STRAIN OF MUSCLE(S) AND TENDON(S) OF THE ROTATOR CUFF OF LEFT SHOULDER, INITIAL ENCOUNTER: ICD-10-CM

## 2023-11-13 DIAGNOSIS — M25.512 PAIN IN LEFT SHOULDER: ICD-10-CM

## 2023-11-13 PROCEDURE — 20611 DRAIN/INJ JOINT/BURSA W/US: CPT | Mod: LT

## 2023-11-13 PROCEDURE — 99214 OFFICE O/P EST MOD 30 MIN: CPT | Mod: 25

## 2023-12-04 ENCOUNTER — NON-APPOINTMENT (OUTPATIENT)
Age: 73
End: 2023-12-04

## 2023-12-04 ENCOUNTER — APPOINTMENT (OUTPATIENT)
Dept: CARDIOLOGY | Facility: CLINIC | Age: 73
End: 2023-12-04
Payer: MEDICARE

## 2023-12-04 VITALS
HEART RATE: 66 BPM | OXYGEN SATURATION: 99 % | DIASTOLIC BLOOD PRESSURE: 76 MMHG | WEIGHT: 147 LBS | HEIGHT: 67.5 IN | SYSTOLIC BLOOD PRESSURE: 117 MMHG | BODY MASS INDEX: 22.8 KG/M2

## 2023-12-04 PROCEDURE — 93000 ELECTROCARDIOGRAM COMPLETE: CPT

## 2023-12-04 PROCEDURE — 99214 OFFICE O/P EST MOD 30 MIN: CPT

## 2023-12-04 RX ORDER — APIXABAN 5 MG/1
5 TABLET, FILM COATED ORAL
Qty: 180 | Refills: 3 | Status: DISCONTINUED | COMMUNITY
Start: 2018-07-24 | End: 2023-12-04

## 2023-12-12 ENCOUNTER — APPOINTMENT (OUTPATIENT)
Dept: OBGYN | Facility: CLINIC | Age: 73
End: 2023-12-12

## 2024-03-01 NOTE — ED ADULT NURSE NOTE - CHIEF COMPLAINT QUOTE
Post Treatment      02/29/24 1930   Vital Signs   BP (!) 105/54   Temp 97.5 °F (36.4 °C)   Pulse 66   Respirations 18   SpO2 95 %   Pain Assessment   Pain Assessment None - Denies Pain   Pain Level 0   Post-Hemodialysis Assessment   Post-Treatment Procedures Blood returned;Access bleeding time < 10 minutes   Machine Disinfection Process Acid/Vinegar Clean;Heat Disinfect;Exterior Machine Disinfection   Rinseback Volume (ml) 300 ml   Blood Volume Processed (Liters) 69 L   Dialyzer Clearance Heavily streaked   Duration of Treatment (minutes) 210 minutes   Heparin Amount Administered During Treatment (mL) 3 mL   Hemodialysis Intake (ml) 600 ml   Hemodialysis Output (ml) 2100 ml   NET Removed (ml) 1500   Tolerated Treatment Fair   Interventions Taken Head of bed lowered;Medication;Ultrafiltration goal decreased;Ultrafiltration stopped  (Albumin given to support bp. Heparin given for AP and VPs)   Patient Response to Treatment fair   Bilateral Breath Sounds Diminished   Edema Left upper extremity;Right lower extremity   RLE Edema +1   LLE Edema +1   Physician Notified Yes   Time Off 1900   Patient Disposition Other (Comment)  (remain in room 519)   Observations & Evaluations   Level of Consciousness 0   Oriented X 1   Heart Rhythm Regular   Respiratory Quality/Effort Unlabored   O2 Device None (Room air)   Skin Color Other (comment)  (appropriate for race)   Skin Condition/Temp Dry;Warm;Fragile   Appetite Good   Abdomen Inspection Rounded   Bowel Sounds (All Quadrants) Active       Hemodialysis Fistula/Graft Arteriovenous fistula Right Arm   Placement Date/Time: 08/03/22 (c) 0100   Access Type: Arteriovenous fistula  Orientation: Right  Access Location: Arm   Site Assessment Clean, dry & intact   Thrill Present   Bruit Present   Status Deaccessed   Date of Last Dressing Change 02/29/24   Dressing Status New dressing applied;Clean, dry & intact     Primary RN SBAR: JOHN Constantino RN     Comments: Pt tolerated treatment fairly.  palpitations this AM that woke her from sleep. patient states she had an ablation on 11/28/18. denies CP, SOB

## 2024-03-23 NOTE — PRE-ANESTHESIA EVALUATION ADULT - NSANTHOSAYNRD_GEN_A_CORE
No. MARIA FERNANDA screening performed.  STOP BANG Legend: 0-2 = LOW Risk; 3-4 = INTERMEDIATE Risk; 5-8 = HIGH Risk
No

## 2024-03-25 ENCOUNTER — NON-APPOINTMENT (OUTPATIENT)
Age: 74
End: 2024-03-25

## 2024-05-16 ENCOUNTER — NON-APPOINTMENT (OUTPATIENT)
Age: 74
End: 2024-05-16

## 2024-05-16 ENCOUNTER — APPOINTMENT (OUTPATIENT)
Dept: CARDIOLOGY | Facility: CLINIC | Age: 74
End: 2024-05-16
Payer: MEDICARE

## 2024-05-16 VITALS
OXYGEN SATURATION: 99 % | DIASTOLIC BLOOD PRESSURE: 70 MMHG | WEIGHT: 150 LBS | SYSTOLIC BLOOD PRESSURE: 114 MMHG | BODY MASS INDEX: 23.54 KG/M2 | HEIGHT: 67 IN | HEART RATE: 62 BPM

## 2024-05-16 DIAGNOSIS — I48.0 PAROXYSMAL ATRIAL FIBRILLATION: ICD-10-CM

## 2024-05-16 DIAGNOSIS — R00.2 PALPITATIONS: ICD-10-CM

## 2024-05-16 PROCEDURE — 93000 ELECTROCARDIOGRAM COMPLETE: CPT

## 2024-05-16 PROCEDURE — 99214 OFFICE O/P EST MOD 30 MIN: CPT

## 2024-05-16 NOTE — HISTORY OF PRESENT ILLNESS
[FreeTextEntry1] : Mrs. Dalal is a 73 year old female with a history of an unclear idiopathic atrial arrhythmia, breast ca s/p surgery and radiation, here for follow up.  Today, she is here for followup.  I last saw her 12/23.  She had an atrial fibrillation ablation on 11/28/2018. Her pulmonary veins were successfully isolated. 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 Despite flecainide and Toprol XL, she had persistent symptoms and resting tachycardia.  She is now s/p ablation on 3/22/2019 of a high darlyn/SVC AT with sinus node modification. The SVC demonstrated automaticity at a rate faster than the atrium. After the procedure, her hr was in the 80's in an accelerated junctional rhythm. She was a little hypotensive post, and an echocardiogram was unremarkable.  Echo with normal LV function, and no valvular pathology as of 1/2022. She had a stress test as well. She exercised for 3 min, though only 73% of max predicated HR was achieved, without ekg changes.   She feels well today. She has no chest pain, difficulty breathing or palpitations.  She has been taking Toprol XL 12.5 at night.  Her last LDL, was around 100, nonfasting. A 2-week monitor in 7/22 demonstrated 16 episodes of SVT, with the longest lasting 27 seconds, and likely a PAT.  An ectopic atrial rhythm was noted.  There was no atrial fibrillation.
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
## 2024-06-24 ENCOUNTER — APPOINTMENT (OUTPATIENT)
Dept: OBGYN | Facility: CLINIC | Age: 74
End: 2024-06-24
Payer: MEDICARE

## 2024-06-24 VITALS — SYSTOLIC BLOOD PRESSURE: 117 MMHG | DIASTOLIC BLOOD PRESSURE: 76 MMHG

## 2024-06-24 PROCEDURE — 99213 OFFICE O/P EST LOW 20 MIN: CPT

## 2024-08-01 ENCOUNTER — APPOINTMENT (OUTPATIENT)
Dept: SURGERY | Facility: CLINIC | Age: 74
End: 2024-08-01
Payer: MEDICARE

## 2024-08-01 VITALS
DIASTOLIC BLOOD PRESSURE: 78 MMHG | HEIGHT: 67 IN | WEIGHT: 150 LBS | BODY MASS INDEX: 23.54 KG/M2 | OXYGEN SATURATION: 98 % | HEART RATE: 59 BPM | SYSTOLIC BLOOD PRESSURE: 123 MMHG

## 2024-08-01 DIAGNOSIS — E04.2 NONTOXIC MULTINODULAR GOITER: ICD-10-CM

## 2024-08-01 PROCEDURE — 99213 OFFICE O/P EST LOW 20 MIN: CPT

## 2024-08-01 NOTE — ASSESSMENT
[FreeTextEntry1] : Assessment:  73-year-old woman with stable bilateral subcentimeter thyroid nodules.  Plan: - The recent follow-up neck ultrasound was reviewed with Ms. Dalal and I explained that when compared to her prior neck ultrasounds, her bilateral subcentimeter thyroid nodules have remained relatively stable in size for close to the past 4 years. - The indications for biopsy were then reviewed and I explained that bilateral subcentimeter thyroid nodules still do not meet MARBELLA criteria for a biopsy.  I therefore recommended continued observation.  Considering that her nodules have remained relatively stable in size for close to the past 4 years, will plan for a follow-up ultrasound in 5 years (July, 2029). - The patient expressed understanding of the above and agrees with this plan.  She was instructed to follow-up PRN or in 5 years after having the repeat neck ultrasound to review the results and further management.

## 2024-08-01 NOTE — PHYSICAL EXAM
[Midline] : located in midline position [Normal] : orientation to person, place, and time: normal [de-identified] : The neck appears flat.  There are no palpable nodules appreciated.  [de-identified] : Extremities: SILVA x 4.   Skin: No obvious skin lesions.   Voice: clear

## 2024-08-01 NOTE — HISTORY OF PRESENT ILLNESS
[de-identified] : Ms. Dalal presents for follow-up.  Her recent (approximately 2-year, 3-month follow-up) neck ultrasound, performed 07/25/2024 (Peg), reported a right upper 0.6 cm hypoechoic solid thyroid nodule, a right mid posterior 0.8 cm hypoechoic solid thyroid nodule, a right mid posterior 0.3 cm hypoechoic solid thyroid nodule, a left upper 0.5 cm hypoechoic solid thyroid nodule, a left mid 0.3 cm hypoechoic solid thyroid nodule, and a left lower 0.3 cm hypoechoic solid thyroid nodule.  There was no lymphadenopathy appreciated.  When compared to her prior neck ultrasounds, her bilateral subcentimeter thyroid nodules have remained relatively stable in size for close to the past 4 years.

## 2024-10-04 ENCOUNTER — LABORATORY RESULT (OUTPATIENT)
Age: 74
End: 2024-10-04

## 2024-10-04 ENCOUNTER — APPOINTMENT (OUTPATIENT)
Dept: INTERNAL MEDICINE | Facility: CLINIC | Age: 74
End: 2024-10-04
Payer: MEDICARE

## 2024-10-04 VITALS
BODY MASS INDEX: 23.54 KG/M2 | WEIGHT: 150 LBS | RESPIRATION RATE: 14 BRPM | HEART RATE: 71 BPM | DIASTOLIC BLOOD PRESSURE: 80 MMHG | SYSTOLIC BLOOD PRESSURE: 116 MMHG | OXYGEN SATURATION: 97 % | HEIGHT: 67 IN

## 2024-10-04 DIAGNOSIS — M25.50 PAIN IN UNSPECIFIED JOINT: ICD-10-CM

## 2024-10-04 DIAGNOSIS — I48.91 UNSPECIFIED ATRIAL FIBRILLATION: ICD-10-CM

## 2024-10-04 PROCEDURE — G0008: CPT

## 2024-10-04 PROCEDURE — 99214 OFFICE O/P EST MOD 30 MIN: CPT

## 2024-10-04 PROCEDURE — 90656 IIV3 VACC NO PRSV 0.5 ML IM: CPT

## 2024-10-04 NOTE — END OF VISIT
[FreeTextEntry3] : "I, Derrek Bear, personally scribed the services dictated to me by Dr. David Stewart MD in this documentation on 10/04/2024"   "I Dr. David Stewart MD, personally performed the services described in this documentation on 10/04/2024 for the patient as scribed by Derrek Bear in my presence. I have reviewed and verified that all the information is accurate and true."

## 2024-10-04 NOTE — HISTORY OF PRESENT ILLNESS
[FreeTextEntry8] : EULALIO RASHID is a 73-year-old F who presents today for an acute visit complaining of bilateral hand pain that began a few weeks ago. Pt has a hx of Atrial Fibrillation and breast cancer. Pt states her fingers being slightly swollen. Pt's swelling and stiffness worsens as the day progresses. Pt has taken Tylenol for her symptoms with no relief. Pt states being unable to tolerate anti-inflammatories due to acid reflux. Pt denies any recent travel.

## 2024-10-04 NOTE — PLAN
[FreeTextEntry1] : continue medications Metoprolol  chronic medical conditions AFib  Further instructions pending lab results  Flu vaccine administered  May need to see Rheumatologist

## 2024-10-04 NOTE — HEALTH RISK ASSESSMENT
[Never (0 pts)] : Never (0 points) [No] : In the past 12 months have you used drugs other than those required for medical reasons? No [No falls in past year] : Patient reported no falls in the past year [Little interest or pleasure doing things] : 1) Little interest or pleasure doing things [Feeling down, depressed, or hopeless] : 2) Feeling down, depressed, or hopeless [0] : 2) Feeling down, depressed, or hopeless: Not at all (0) [PHQ-2 Negative - No further assessment needed] : PHQ-2 Negative - No further assessment needed [Never] : Never [LIV1Sbtcy] : 0

## 2024-10-04 NOTE — PHYSICAL EXAM
[No Acute Distress] : no acute distress [Well Nourished] : well nourished [Well Developed] : well developed [Well-Appearing] : well-appearing [Normal Voice/Communication] : normal voice/communication [Normal Sclera/Conjunctiva] : normal sclera/conjunctiva [PERRL] : pupils equal round and reactive to light [EOMI] : extraocular movements intact [Normal Outer Ear/Nose] : the outer ears and nose were normal in appearance [Normal Oropharynx] : the oropharynx was normal [Normal TMs] : both tympanic membranes were normal [No JVD] : no jugular venous distention [No Lymphadenopathy] : no lymphadenopathy [Supple] : supple [Thyroid Normal, No Nodules] : the thyroid was normal and there were no nodules present [No Respiratory Distress] : no respiratory distress  [No Accessory Muscle Use] : no accessory muscle use [Clear to Auscultation] : lungs were clear to auscultation bilaterally [Normal Rate] : normal rate  [Regular Rhythm] : with a regular rhythm [Normal S1, S2] : normal S1 and S2 [No Murmur] : no murmur heard [No Carotid Bruits] : no carotid bruits [No Abdominal Bruit] : a ~M bruit was not heard ~T in the abdomen [No Varicosities] : no varicosities [Pedal Pulses Present] : the pedal pulses are present [No Edema] : there was no peripheral edema [No Palpable Aorta] : no palpable aorta [No Extremity Clubbing/Cyanosis] : no extremity clubbing/cyanosis [Soft] : abdomen soft [Non Tender] : non-tender [Non-distended] : non-distended [No Masses] : no abdominal mass palpated [No HSM] : no HSM [Normal Bowel Sounds] : normal bowel sounds [Normal Supraclavicular Nodes] : no supraclavicular lymphadenopathy [Normal Posterior Cervical Nodes] : no posterior cervical lymphadenopathy [Normal Anterior Cervical Nodes] : no anterior cervical lymphadenopathy [No CVA Tenderness] : no CVA  tenderness [No Spinal Tenderness] : no spinal tenderness [Grossly Normal Strength/Tone] : grossly normal strength/tone [No Rash] : no rash [Coordination Grossly Intact] : coordination grossly intact [No Focal Deficits] : no focal deficits [Normal Gait] : normal gait [Deep Tendon Reflexes (DTR)] : deep tendon reflexes were 2+ and symmetric [Speech Grossly Normal] : speech grossly normal [Memory Grossly Normal] : memory grossly normal [Normal Affect] : the affect was normal [Alert and Oriented x3] : oriented to person, place, and time [Normal Mood] : the mood was normal [Normal Insight/Judgement] : insight and judgment were intact [de-identified] : swelling, tenderness bilateral hands

## 2024-10-10 LAB
ANA PAT FLD IF-IMP: ABNORMAL
ANA SER IF-ACNC: ABNORMAL
BASOPHILS # BLD AUTO: 0.03 K/UL
BASOPHILS NFR BLD AUTO: 0.5 %
CRP SERPL-MCNC: <3 MG/L
EOSINOPHIL # BLD AUTO: 0.07 K/UL
EOSINOPHIL NFR BLD AUTO: 1.3 %
ERYTHROCYTE [SEDIMENTATION RATE] IN BLOOD BY WESTERGREN METHOD: 33 MM/HR
HCT VFR BLD CALC: 44.1 %
HGB BLD-MCNC: 13.9 G/DL
IMM GRANULOCYTES NFR BLD AUTO: 0 %
LYMPHOCYTES # BLD AUTO: 1.55 K/UL
LYMPHOCYTES NFR BLD AUTO: 27.9 %
MAN DIFF?: NORMAL
MCHC RBC-ENTMCNC: 27.3 PG
MCHC RBC-ENTMCNC: 31.5 GM/DL
MCV RBC AUTO: 86.5 FL
MONOCYTES # BLD AUTO: 0.45 K/UL
MONOCYTES NFR BLD AUTO: 8.1 %
NEUTROPHILS # BLD AUTO: 3.46 K/UL
NEUTROPHILS NFR BLD AUTO: 62.2 %
PLATELET # BLD AUTO: 188 K/UL
RBC # BLD: 5.1 M/UL
RBC # FLD: 14 %
RHEUMATOID FACT SER QL: <10 IU/ML
WBC # FLD AUTO: 5.56 K/UL

## 2024-11-07 ENCOUNTER — APPOINTMENT (OUTPATIENT)
Dept: INTERNAL MEDICINE | Facility: CLINIC | Age: 74
End: 2024-11-07
Payer: MEDICARE

## 2024-11-07 VITALS
HEIGHT: 67 IN | DIASTOLIC BLOOD PRESSURE: 74 MMHG | RESPIRATION RATE: 16 BRPM | SYSTOLIC BLOOD PRESSURE: 118 MMHG | OXYGEN SATURATION: 99 % | WEIGHT: 150 LBS | HEART RATE: 78 BPM | TEMPERATURE: 98.3 F | BODY MASS INDEX: 23.54 KG/M2

## 2024-11-07 DIAGNOSIS — I48.91 UNSPECIFIED ATRIAL FIBRILLATION: ICD-10-CM

## 2024-11-07 DIAGNOSIS — Z00.00 ENCOUNTER FOR GENERAL ADULT MEDICAL EXAMINATION W/OUT ABNORMAL FINDINGS: ICD-10-CM

## 2024-11-07 PROCEDURE — G0439: CPT

## 2024-11-08 LAB
25(OH)D3 SERPL-MCNC: 36.2 NG/ML
ALBUMIN SERPL ELPH-MCNC: 4.3 G/DL
ALP BLD-CCNC: 91 U/L
ALT SERPL-CCNC: 17 U/L
ANION GAP SERPL CALC-SCNC: 13 MMOL/L
APPEARANCE: CLEAR
AST SERPL-CCNC: 21 U/L
BACTERIA: NEGATIVE /HPF
BASOPHILS # BLD AUTO: 0.03 K/UL
BASOPHILS NFR BLD AUTO: 0.4 %
BILIRUB SERPL-MCNC: 0.4 MG/DL
BILIRUBIN URINE: NEGATIVE
BLOOD URINE: NEGATIVE
BUN SERPL-MCNC: 15 MG/DL
CALCIUM SERPL-MCNC: 9.3 MG/DL
CAST: 0 /LPF
CHLORIDE SERPL-SCNC: 104 MMOL/L
CHOLEST SERPL-MCNC: 215 MG/DL
CO2 SERPL-SCNC: 25 MMOL/L
COLOR: YELLOW
CREAT SERPL-MCNC: 0.72 MG/DL
EGFR: 89 ML/MIN/1.73M2
EOSINOPHIL # BLD AUTO: 0.04 K/UL
EOSINOPHIL NFR BLD AUTO: 0.6 %
EPITHELIAL CELLS: 0 /HPF
ESTIMATED AVERAGE GLUCOSE: 117 MG/DL
FOLATE SERPL-MCNC: >20 NG/ML
GLUCOSE QUALITATIVE U: NEGATIVE MG/DL
GLUCOSE SERPL-MCNC: 97 MG/DL
HBA1C MFR BLD HPLC: 5.7 %
HCT VFR BLD CALC: 42.1 %
HDLC SERPL-MCNC: 83 MG/DL
HGB BLD-MCNC: 13.6 G/DL
IMM GRANULOCYTES NFR BLD AUTO: 0.3 %
KETONES URINE: NEGATIVE MG/DL
LDLC SERPL CALC-MCNC: 112 MG/DL
LEUKOCYTE ESTERASE URINE: NEGATIVE
LYMPHOCYTES # BLD AUTO: 1.48 K/UL
LYMPHOCYTES NFR BLD AUTO: 21.7 %
MAN DIFF?: NORMAL
MCHC RBC-ENTMCNC: 28.1 PG
MCHC RBC-ENTMCNC: 32.3 G/DL
MCV RBC AUTO: 87 FL
MICROSCOPIC-UA: NORMAL
MONOCYTES # BLD AUTO: 0.46 K/UL
MONOCYTES NFR BLD AUTO: 6.7 %
NEUTROPHILS # BLD AUTO: 4.8 K/UL
NEUTROPHILS NFR BLD AUTO: 70.3 %
NITRITE URINE: NEGATIVE
NONHDLC SERPL-MCNC: 131 MG/DL
PH URINE: 6
PLATELET # BLD AUTO: 204 K/UL
POTASSIUM SERPL-SCNC: 4.3 MMOL/L
PROT SERPL-MCNC: 6.6 G/DL
PROTEIN URINE: NEGATIVE MG/DL
RBC # BLD: 4.84 M/UL
RBC # FLD: 14.5 %
RED BLOOD CELLS URINE: 1 /HPF
SODIUM SERPL-SCNC: 142 MMOL/L
SPECIFIC GRAVITY URINE: 1.01
TRIGL SERPL-MCNC: 113 MG/DL
TSH SERPL-ACNC: 0.82 UIU/ML
UROBILINOGEN URINE: 0.2 MG/DL
VIT B12 SERPL-MCNC: 795 PG/ML
WBC # FLD AUTO: 6.83 K/UL
WHITE BLOOD CELLS URINE: 0 /HPF

## 2024-11-11 ENCOUNTER — APPOINTMENT (OUTPATIENT)
Dept: CARDIOLOGY | Facility: CLINIC | Age: 74
End: 2024-11-11
Payer: MEDICARE

## 2024-11-11 ENCOUNTER — NON-APPOINTMENT (OUTPATIENT)
Age: 74
End: 2024-11-11

## 2024-11-11 VITALS
HEIGHT: 67 IN | WEIGHT: 150 LBS | OXYGEN SATURATION: 97 % | HEART RATE: 64 BPM | SYSTOLIC BLOOD PRESSURE: 109 MMHG | DIASTOLIC BLOOD PRESSURE: 72 MMHG | BODY MASS INDEX: 23.54 KG/M2

## 2024-11-11 DIAGNOSIS — I48.0 PAROXYSMAL ATRIAL FIBRILLATION: ICD-10-CM

## 2024-11-11 DIAGNOSIS — R94.31 ABNORMAL ELECTROCARDIOGRAM [ECG] [EKG]: ICD-10-CM

## 2024-11-11 PROCEDURE — 99214 OFFICE O/P EST MOD 30 MIN: CPT

## 2024-11-11 PROCEDURE — 93000 ELECTROCARDIOGRAM COMPLETE: CPT

## 2024-11-11 RX ORDER — CYCLOBENZAPRINE HYDROCHLORIDE 5 MG/1
5 TABLET, FILM COATED ORAL
Qty: 60 | Refills: 0 | Status: ACTIVE | COMMUNITY
Start: 2024-11-11 | End: 1900-01-01

## 2024-11-18 ENCOUNTER — NON-APPOINTMENT (OUTPATIENT)
Age: 74
End: 2024-11-18

## 2024-11-21 ENCOUNTER — APPOINTMENT (OUTPATIENT)
Dept: RHEUMATOLOGY | Facility: CLINIC | Age: 74
End: 2024-11-21
Payer: MEDICARE

## 2024-11-21 VITALS
SYSTOLIC BLOOD PRESSURE: 106 MMHG | TEMPERATURE: 97.5 F | DIASTOLIC BLOOD PRESSURE: 80 MMHG | BODY MASS INDEX: 23.27 KG/M2 | HEIGHT: 67.5 IN | WEIGHT: 150 LBS | OXYGEN SATURATION: 98 % | HEART RATE: 78 BPM

## 2024-11-21 DIAGNOSIS — R76.8 OTHER SPECIFIED ABNORMAL IMMUNOLOGICAL FINDINGS IN SERUM: ICD-10-CM

## 2024-11-21 DIAGNOSIS — M25.541 PAIN IN JOINTS OF RIGHT HAND: ICD-10-CM

## 2024-11-21 DIAGNOSIS — M25.542 PAIN IN JOINTS OF RIGHT HAND: ICD-10-CM

## 2024-11-21 PROCEDURE — 99205 OFFICE O/P NEW HI 60 MIN: CPT

## 2024-11-21 PROCEDURE — G2211 COMPLEX E/M VISIT ADD ON: CPT

## 2024-11-21 PROCEDURE — 36415 COLL VENOUS BLD VENIPUNCTURE: CPT

## 2024-11-22 ENCOUNTER — APPOINTMENT (OUTPATIENT)
Dept: OBGYN | Facility: CLINIC | Age: 74
End: 2024-11-22
Payer: MEDICARE

## 2024-11-22 VITALS
DIASTOLIC BLOOD PRESSURE: 82 MMHG | BODY MASS INDEX: 23.27 KG/M2 | SYSTOLIC BLOOD PRESSURE: 125 MMHG | WEIGHT: 150 LBS | HEIGHT: 67.5 IN

## 2024-11-22 DIAGNOSIS — A60.00 HERPESVIRAL INFECTION OF UROGENITAL SYSTEM, UNSPECIFIED: ICD-10-CM

## 2024-11-22 DIAGNOSIS — Z13.31 ENCOUNTER FOR SCREENING FOR DEPRESSION: ICD-10-CM

## 2024-11-22 DIAGNOSIS — C50.919 MALIGNANT NEOPLASM OF UNSPECIFIED SITE OF UNSPECIFIED FEMALE BREAST: ICD-10-CM

## 2024-11-22 DIAGNOSIS — Z90.722 ACQUIRED ABSENCE OF OVARIES, BILATERAL: ICD-10-CM

## 2024-11-22 DIAGNOSIS — N90.5 ATROPHY OF VULVA: ICD-10-CM

## 2024-11-22 DIAGNOSIS — R92.30 DENSE BREASTS, UNSPECIFIED: ICD-10-CM

## 2024-11-22 DIAGNOSIS — Z01.411 ENCOUNTER FOR GYNECOLOGICAL EXAMINATION (GENERAL) (ROUTINE) WITH ABNORMAL FINDINGS: ICD-10-CM

## 2024-11-22 DIAGNOSIS — M85.80 OTHER SPECIFIED DISORDERS OF BONE DENSITY AND STRUCTURE, UNSPECIFIED SITE: ICD-10-CM

## 2024-11-22 DIAGNOSIS — N81.10 CYSTOCELE, UNSPECIFIED: ICD-10-CM

## 2024-11-22 LAB
ALBUMIN SERPL ELPH-MCNC: 4.7 G/DL
ALP BLD-CCNC: 96 U/L
ALT SERPL-CCNC: 18 U/L
ANION GAP SERPL CALC-SCNC: 14 MMOL/L
APPEARANCE: CLEAR
AST SERPL-CCNC: 23 U/L
B19V IGG SER QL IA: 0.13 INDEX
B19V IGG+IGM SER-IMP: NEGATIVE
B19V IGG+IGM SER-IMP: NORMAL
B19V IGM FLD-ACNC: 0.16 INDEX
B19V IGM SER-ACNC: NEGATIVE
BILIRUB SERPL-MCNC: 0.4 MG/DL
BILIRUBIN URINE: NEGATIVE
BLOOD URINE: NEGATIVE
BUN SERPL-MCNC: 14 MG/DL
C3 SERPL-MCNC: 164 MG/DL
C4 SERPL-MCNC: 33 MG/DL
CALCIUM SERPL-MCNC: 9.9 MG/DL
CCP AB SER IA-ACNC: <8 U/ML
CHLORIDE SERPL-SCNC: 101 MMOL/L
CO2 SERPL-SCNC: 26 MMOL/L
COLOR: YELLOW
CREAT SERPL-MCNC: 0.69 MG/DL
CREAT SPEC-SCNC: 144 MG/DL
CREAT/PROT UR: 0.1 RATIO
CRP SERPL-MCNC: <3 MG/L
DSDNA AB SER-ACNC: <1 IU/ML
EGFR: 92 ML/MIN/1.73M2
ENA RNP AB SER IA-ACNC: <0.2 AL
ENA SM AB SER IA-ACNC: <0.2 AL
ENA SS-A AB SER IA-ACNC: <0.2 AL
ENA SS-B AB SER IA-ACNC: <0.2 AL
ERYTHROCYTE [SEDIMENTATION RATE] IN BLOOD BY WESTERGREN METHOD: 26 MM/HR
GLUCOSE QUALITATIVE U: NEGATIVE MG/DL
GLUCOSE SERPL-MCNC: 84 MG/DL
KETONES URINE: NEGATIVE MG/DL
LEUKOCYTE ESTERASE URINE: NEGATIVE
NITRITE URINE: NEGATIVE
PH URINE: 6
POTASSIUM SERPL-SCNC: 4.6 MMOL/L
PROT SERPL-MCNC: 7.2 G/DL
PROT UR-MCNC: 12 MG/DL
PROTEIN URINE: NEGATIVE MG/DL
RF+CCP IGG SER-IMP: NEGATIVE
SODIUM SERPL-SCNC: 140 MMOL/L
SPECIFIC GRAVITY URINE: 1.02
THYROGLOB AB SERPL-ACNC: <15 IU/ML
THYROPEROXIDASE AB SERPL IA-ACNC: <9 IU/ML
UROBILINOGEN URINE: 0.2 MG/DL

## 2024-11-22 PROCEDURE — G0444 DEPRESSION SCREEN ANNUAL: CPT | Mod: 59

## 2024-11-22 PROCEDURE — G0101: CPT

## 2024-11-22 PROCEDURE — 77080 DXA BONE DENSITY AXIAL: CPT

## 2024-11-22 PROCEDURE — G0328 FECAL BLOOD SCRN IMMUNOASSAY: CPT | Mod: QW

## 2024-11-25 LAB
G6PD SER-CCNC: 14.4 U/G HGB
HPV HIGH+LOW RISK DNA PNL CVX: NOT DETECTED

## 2024-11-26 LAB — HLA-B27 QL NAA+PROBE: NORMAL

## 2024-11-29 LAB — CYTOLOGY CVX/VAG DOC THIN PREP: ABNORMAL

## 2024-12-03 ENCOUNTER — NON-APPOINTMENT (OUTPATIENT)
Age: 74
End: 2024-12-03

## 2025-05-06 ENCOUNTER — NON-APPOINTMENT (OUTPATIENT)
Age: 75
End: 2025-05-06

## 2025-05-08 ENCOUNTER — APPOINTMENT (OUTPATIENT)
Dept: RHEUMATOLOGY | Facility: CLINIC | Age: 75
End: 2025-05-08
Payer: MEDICARE

## 2025-05-08 VITALS
HEIGHT: 67.5 IN | BODY MASS INDEX: 23.27 KG/M2 | TEMPERATURE: 97.5 F | WEIGHT: 150 LBS | DIASTOLIC BLOOD PRESSURE: 68 MMHG | SYSTOLIC BLOOD PRESSURE: 114 MMHG | HEART RATE: 71 BPM | OXYGEN SATURATION: 98 %

## 2025-05-08 DIAGNOSIS — M25.50 PAIN IN UNSPECIFIED JOINT: ICD-10-CM

## 2025-05-08 DIAGNOSIS — M25.541 PAIN IN JOINTS OF RIGHT HAND: ICD-10-CM

## 2025-05-08 DIAGNOSIS — M25.542 PAIN IN JOINTS OF RIGHT HAND: ICD-10-CM

## 2025-05-08 PROCEDURE — 99214 OFFICE O/P EST MOD 30 MIN: CPT

## 2025-05-08 PROCEDURE — 36415 COLL VENOUS BLD VENIPUNCTURE: CPT

## 2025-05-08 PROCEDURE — G2211 COMPLEX E/M VISIT ADD ON: CPT

## 2025-05-09 ENCOUNTER — OUTPATIENT (OUTPATIENT)
Dept: OUTPATIENT SERVICES | Facility: HOSPITAL | Age: 75
LOS: 1 days | End: 2025-05-09
Payer: MEDICARE

## 2025-05-09 ENCOUNTER — APPOINTMENT (OUTPATIENT)
Dept: ULTRASOUND IMAGING | Facility: CLINIC | Age: 75
End: 2025-05-09

## 2025-05-09 ENCOUNTER — RESULT REVIEW (OUTPATIENT)
Age: 75
End: 2025-05-09

## 2025-05-09 DIAGNOSIS — Z98.890 OTHER SPECIFIED POSTPROCEDURAL STATES: Chronic | ICD-10-CM

## 2025-05-09 DIAGNOSIS — M25.541 PAIN IN JOINTS OF RIGHT HAND: ICD-10-CM

## 2025-05-09 DIAGNOSIS — Z90.721 ACQUIRED ABSENCE OF OVARIES, UNILATERAL: Chronic | ICD-10-CM

## 2025-05-09 PROCEDURE — 76881 US COMPL JOINT R-T W/IMG: CPT | Mod: 26,LT,76

## 2025-05-09 PROCEDURE — 76881 US COMPL JOINT R-T W/IMG: CPT | Mod: 26,RT

## 2025-05-09 PROCEDURE — 76881 US COMPL JOINT R-T W/IMG: CPT

## 2025-05-12 ENCOUNTER — NON-APPOINTMENT (OUTPATIENT)
Age: 75
End: 2025-05-12

## 2025-05-12 LAB
CRP SERPL-MCNC: <3 MG/L
ERYTHROCYTE [SEDIMENTATION RATE] IN BLOOD BY WESTERGREN METHOD: 21 MM/HR

## 2025-05-16 ENCOUNTER — APPOINTMENT (OUTPATIENT)
Dept: CARDIOLOGY | Facility: CLINIC | Age: 75
End: 2025-05-16
Payer: MEDICARE

## 2025-05-16 ENCOUNTER — NON-APPOINTMENT (OUTPATIENT)
Age: 75
End: 2025-05-16

## 2025-05-16 VITALS — DIASTOLIC BLOOD PRESSURE: 75 MMHG | SYSTOLIC BLOOD PRESSURE: 122 MMHG

## 2025-05-16 VITALS
SYSTOLIC BLOOD PRESSURE: 130 MMHG | OXYGEN SATURATION: 98 % | HEART RATE: 61 BPM | HEIGHT: 68 IN | DIASTOLIC BLOOD PRESSURE: 73 MMHG | BODY MASS INDEX: 22.88 KG/M2 | WEIGHT: 151 LBS

## 2025-05-16 DIAGNOSIS — I48.0 PAROXYSMAL ATRIAL FIBRILLATION: ICD-10-CM

## 2025-05-16 DIAGNOSIS — R00.2 PALPITATIONS: ICD-10-CM

## 2025-05-16 PROCEDURE — 99214 OFFICE O/P EST MOD 30 MIN: CPT

## 2025-05-16 PROCEDURE — 93000 ELECTROCARDIOGRAM COMPLETE: CPT

## 2025-05-19 ENCOUNTER — NON-APPOINTMENT (OUTPATIENT)
Age: 75
End: 2025-05-19

## 2025-05-19 LAB
HAV IGM SER QL: NONREACTIVE
HBV CORE IGG+IGM SER QL: NONREACTIVE
HBV CORE IGM SER QL: NONREACTIVE
HBV SURFACE AG SER QL: NONREACTIVE
HCV AB SER QL: NONREACTIVE
HCV S/CO RATIO: 0.12 S/CO
HIV1+2 AB SPEC QL IA.RAPID: NONREACTIVE

## 2025-06-28 ENCOUNTER — NON-APPOINTMENT (OUTPATIENT)
Age: 75
End: 2025-06-28